# Patient Record
Sex: FEMALE | NOT HISPANIC OR LATINO | Employment: UNEMPLOYED | URBAN - METROPOLITAN AREA
[De-identification: names, ages, dates, MRNs, and addresses within clinical notes are randomized per-mention and may not be internally consistent; named-entity substitution may affect disease eponyms.]

---

## 2017-10-12 ENCOUNTER — HOSPITAL ENCOUNTER (OUTPATIENT)
Dept: FAMILY MEDICINE CLINIC | Facility: CLINIC | Age: 72
Setting detail: SPECIMEN
Discharge: HOME | End: 2017-10-12
Attending: FAMILY MEDICINE | Admitting: FAMILY MEDICINE

## 2017-10-12 LAB
ALBUMIN SERPL-MCNC: 4.1 G/DL (ref 3.5–4.8)
ALBUMIN/GLOB SERPL: 1.8 {RATIO} (ref 1–1.7)
ALP SERPL-CCNC: 37 IU/L (ref 32–91)
ALT SERPL-CCNC: 31 IU/L (ref 14–54)
AMPICILLIN SUSC ISLT: NORMAL
ANION GAP SERPL CALC-SCNC: 10.7 MMOL/L (ref 10–20)
APTT BLD: 34 SEC (ref 61–76.5)
AST SERPL-CCNC: 29 IU/L (ref 15–41)
BACTERIA ISLT: NORMAL
BACTERIA SPEC AEROBE CULT: NORMAL
BASOPHILS # BLD AUTO: 0 10*3/UL (ref 0–0.2)
BASOPHILS NFR BLD AUTO: 1 % (ref 0–2)
BILIRUB SERPL-MCNC: 0.4 MG/DL (ref 0.3–1.2)
BILIRUB UR QL STRIP: NEGATIVE MG/DL
BUN SERPL-MCNC: 18 MG/DL (ref 8–20)
BUN/CREAT SERPL: 25.7 (ref 5.4–26.2)
CALCIUM SERPL-MCNC: 9.3 MG/DL (ref 8.9–10.3)
CASTS URNS QL MICRO: ABNORMAL /[LPF]
CHLORIDE SERPL-SCNC: 102 MMOL/L (ref 101–111)
CHOLEST SERPL-MCNC: 251 MG/DL
CHOLEST/HDLC SERPL: 5.3 {RATIO}
COLONY COUNT: NORMAL
COLOR UR: YELLOW
CONV BACTERIA IN URINE MICRO: ABNORMAL
CONV CLARITY OF URINE: ABNORMAL
CONV CO2: 29 MMOL/L (ref 22–32)
CONV HYALINE CASTS IN URINE MICRO: ABNORMAL /[LPF] (ref 0–5)
CONV LDL CHOLESTEROL DIRECT: 178 MG/DL (ref 0–100)
CONV PROTEIN IN URINE BY AUTOMATED TEST STRIP: NEGATIVE MG/DL
CONV SMALL ROUND CELLS: ABNORMAL /[HPF]
CONV TOTAL PROTEIN: 6.4 G/DL (ref 6.1–7.9)
CONV UROBILINOGEN IN URINE BY AUTOMATED TEST STRIP: 0.2 MG/DL
CREAT UR-MCNC: 0.7 MG/DL (ref 0.4–1)
CULTURE INDICATED?: ABNORMAL
DIFFERENTIAL METHOD BLD: (no result)
EOSINOPHIL # BLD AUTO: 0.2 10*3/UL (ref 0–0.3)
EOSINOPHIL # BLD AUTO: 3 % (ref 0–3)
ERYTHROCYTE [DISTWIDTH] IN BLOOD BY AUTOMATED COUNT: 13.2 % (ref 11.5–14.5)
GLOBULIN UR ELPH-MCNC: 2.3 G/DL (ref 2.5–3.8)
GLUCOSE SERPL-MCNC: 79 MG/DL (ref 65–99)
GLUCOSE UR QL: NEGATIVE MG/DL
HCT VFR BLD AUTO: 36.8 % (ref 35–49)
HDLC SERPL-MCNC: 47 MG/DL
HGB BLD-MCNC: 12.7 G/DL (ref 12–15)
HGB UR QL STRIP: NEGATIVE
INR PPP: 1 (ref 2–3)
KETONES UR QL STRIP: NEGATIVE MG/DL
LDLC/HDLC SERPL: 3.8 {RATIO}
LEUKOCYTE ESTERASE UR QL STRIP: ABNORMAL
LEVOFLOXACIN SUSC ISLT: NORMAL
LIPID INTERPRETATION: ABNORMAL
LYMPHOCYTES # BLD AUTO: 1.1 10*3/UL (ref 0.8–4.8)
LYMPHOCYTES NFR BLD AUTO: 23 % (ref 18–42)
Lab: NORMAL
MCH RBC QN AUTO: 31.5 PG (ref 26–32)
MCHC RBC AUTO-ENTMCNC: 34.4 G/DL (ref 32–36)
MCV RBC AUTO: 91.6 FL (ref 80–94)
MICRO REPORT STATUS: NORMAL
MONOCYTES # BLD AUTO: 0.4 10*3/UL (ref 0.1–1.3)
MONOCYTES NFR BLD AUTO: 9 % (ref 2–11)
NEUTROPHILS # BLD AUTO: 3 10*3/UL (ref 2.3–8.6)
NEUTROPHILS NFR BLD AUTO: 64 % (ref 50–75)
NITRITE UR QL STRIP: NEGATIVE
NITROFURANTOIN SUSC ISLT: NORMAL
NRBC BLD AUTO-RTO: 0 /100{WBCS}
NRBC/RBC NFR BLD MANUAL: 0 10*3/UL
PH UR STRIP.AUTO: 7 [PH] (ref 4.5–8)
PLATELET # BLD AUTO: 233 10*3/UL (ref 150–450)
PMV BLD AUTO: 8.5 FL (ref 7.4–10.4)
POTASSIUM SERPL-SCNC: 3.7 MMOL/L (ref 3.6–5.1)
PROTHROMBIN TIME: 10.7 SEC (ref 19.4–28.5)
RBC # BLD AUTO: 4.02 10*6/UL (ref 4–5.4)
RBC #/AREA URNS HPF: 1 /[HPF] (ref 0–3)
SODIUM SERPL-SCNC: 138 MMOL/L (ref 136–144)
SP GR UR: 1.01 (ref 1–1.03)
SPECIMEN SOURCE: NORMAL
SPERM URNS QL MICRO: ABNORMAL /[HPF]
SQUAMOUS SPT QL MICRO: 10 /[HPF] (ref 0–5)
SUSC METH SPEC: NORMAL
T3 SERPL-MCNC: 0.72 NG/ML (ref 0.87–1.78)
T4 FREE SERPL-MCNC: 1.22 NG/DL (ref 0.58–1.64)
TETRACYCLINE SUSC ISLT: NORMAL
TRIGL SERPL-MCNC: 101 MG/DL
TSH SERPL-ACNC: 1.72 UIU/ML (ref 0.34–5.6)
UNIDENT CRYS URNS QL MICRO: ABNORMAL /[HPF]
VANCOMYCIN SUSC ISLT: NORMAL
VIT B12 SERPL-MCNC: 399 PG/ML (ref 180–914)
VLDLC SERPL CALC-MCNC: 25.5 MG/DL
WBC # BLD AUTO: 4.7 10*3/UL (ref 4.5–11.5)
WBC #/AREA URNS HPF: 31 /[HPF] (ref 0–5)
YEAST SPEC QL WET PREP: ABNORMAL /[HPF]

## 2017-11-02 ENCOUNTER — HOSPITAL ENCOUNTER (OUTPATIENT)
Dept: ORTHOPEDIC SURGERY | Facility: CLINIC | Age: 72
Discharge: HOME | End: 2017-11-02
Attending: PODIATRIST | Admitting: PODIATRIST

## 2018-06-11 ENCOUNTER — HOSPITAL ENCOUNTER (OUTPATIENT)
Dept: FAMILY MEDICINE CLINIC | Facility: CLINIC | Age: 73
Setting detail: SPECIMEN
Discharge: HOME | End: 2018-06-11
Attending: FAMILY MEDICINE | Admitting: FAMILY MEDICINE

## 2018-06-11 LAB
ALBUMIN SERPL-MCNC: 3.9 G/DL (ref 3.5–4.8)
ALBUMIN/GLOB SERPL: 1.6 {RATIO} (ref 1–1.7)
ALP SERPL-CCNC: 49 IU/L (ref 32–91)
ALT SERPL-CCNC: 28 IU/L (ref 14–54)
ANION GAP SERPL CALC-SCNC: 13.2 MMOL/L (ref 10–20)
AST SERPL-CCNC: 29 IU/L (ref 15–41)
BILIRUB SERPL-MCNC: 0.8 MG/DL (ref 0.3–1.2)
BUN SERPL-MCNC: 13 MG/DL (ref 8–20)
BUN/CREAT SERPL: 18.6 (ref 5.4–26.2)
CALCIUM SERPL-MCNC: 9 MG/DL (ref 8.9–10.3)
CHLORIDE SERPL-SCNC: 102 MMOL/L (ref 101–111)
CHOLEST SERPL-MCNC: 192 MG/DL
CHOLEST/HDLC SERPL: 5.1 {RATIO}
CONV CO2: 27 MMOL/L (ref 22–32)
CONV LDL CHOLESTEROL DIRECT: 131 MG/DL (ref 0–100)
CONV TOTAL PROTEIN: 6.4 G/DL (ref 6.1–7.9)
CREAT UR-MCNC: 0.7 MG/DL (ref 0.4–1)
GLOBULIN UR ELPH-MCNC: 2.5 G/DL (ref 2.5–3.8)
GLUCOSE SERPL-MCNC: 95 MG/DL (ref 65–99)
HDLC SERPL-MCNC: 37 MG/DL
LDLC/HDLC SERPL: 3.5 {RATIO}
LIPID INTERPRETATION: ABNORMAL
POTASSIUM SERPL-SCNC: 4.2 MMOL/L (ref 3.6–5.1)
SODIUM SERPL-SCNC: 138 MMOL/L (ref 136–144)
TRIGL SERPL-MCNC: 103 MG/DL
VLDLC SERPL CALC-MCNC: 23.4 MG/DL

## 2018-10-16 ENCOUNTER — HOSPITAL ENCOUNTER (OUTPATIENT)
Dept: FAMILY MEDICINE CLINIC | Facility: CLINIC | Age: 73
Setting detail: SPECIMEN
Discharge: HOME | End: 2018-10-16
Attending: FAMILY MEDICINE | Admitting: FAMILY MEDICINE

## 2018-10-16 LAB
ALBUMIN SERPL-MCNC: 4 G/DL (ref 3.5–4.8)
ALBUMIN/GLOB SERPL: 1.5 {RATIO} (ref 1–1.7)
ALP SERPL-CCNC: 55 IU/L (ref 32–91)
ALT SERPL-CCNC: 29 IU/L (ref 14–54)
ANION GAP SERPL CALC-SCNC: 15.8 MMOL/L (ref 10–20)
AST SERPL-CCNC: 28 IU/L (ref 15–41)
BASOPHILS # BLD AUTO: 0.1 10*3/UL (ref 0–0.2)
BASOPHILS NFR BLD AUTO: 1 % (ref 0–2)
BILIRUB SERPL-MCNC: 0.7 MG/DL (ref 0.3–1.2)
BUN SERPL-MCNC: 13 MG/DL (ref 8–20)
BUN/CREAT SERPL: 16.3 (ref 5.4–26.2)
CALCIUM SERPL-MCNC: 9.1 MG/DL (ref 8.9–10.3)
CHLORIDE SERPL-SCNC: 102 MMOL/L (ref 101–111)
CHOLEST SERPL-MCNC: 180 MG/DL
CHOLEST/HDLC SERPL: 4.1 {RATIO}
CONV CO2: 27 MMOL/L (ref 22–32)
CONV LDL CHOLESTEROL DIRECT: 121 MG/DL (ref 0–100)
CONV TOTAL PROTEIN: 6.6 G/DL (ref 6.1–7.9)
CREAT UR-MCNC: 0.8 MG/DL (ref 0.4–1)
DIFFERENTIAL METHOD BLD: (no result)
EOSINOPHIL # BLD AUTO: 0.1 10*3/UL (ref 0–0.3)
EOSINOPHIL # BLD AUTO: 2 % (ref 0–3)
ERYTHROCYTE [DISTWIDTH] IN BLOOD BY AUTOMATED COUNT: 12.6 % (ref 11.5–14.5)
ERYTHROCYTE [SEDIMENTATION RATE] IN BLOOD BY WESTERGREN METHOD: 16 MM/HR (ref 0–30)
GLOBULIN UR ELPH-MCNC: 2.6 G/DL (ref 2.5–3.8)
GLUCOSE SERPL-MCNC: 85 MG/DL (ref 65–99)
HCT VFR BLD AUTO: 38.1 % (ref 35–49)
HDLC SERPL-MCNC: 44 MG/DL
HGB BLD-MCNC: 12.7 G/DL (ref 12–15)
LDLC/HDLC SERPL: 2.8 {RATIO}
LIPID INTERPRETATION: ABNORMAL
LYMPHOCYTES # BLD AUTO: 1.5 10*3/UL (ref 0.8–4.8)
LYMPHOCYTES NFR BLD AUTO: 24 % (ref 18–42)
MCH RBC QN AUTO: 30.4 PG (ref 26–32)
MCHC RBC AUTO-ENTMCNC: 33.4 G/DL (ref 32–36)
MCV RBC AUTO: 90.9 FL (ref 80–94)
MONOCYTES # BLD AUTO: 0.4 10*3/UL (ref 0.1–1.3)
MONOCYTES NFR BLD AUTO: 7 % (ref 2–11)
NEUTROPHILS # BLD AUTO: 4.3 10*3/UL (ref 2.3–8.6)
NEUTROPHILS NFR BLD AUTO: 66 % (ref 50–75)
NRBC BLD AUTO-RTO: 0 /100{WBCS}
NRBC/RBC NFR BLD MANUAL: 0 10*3/UL
PLATELET # BLD AUTO: 266 10*3/UL (ref 150–450)
PMV BLD AUTO: 9.8 FL (ref 7.4–10.4)
POTASSIUM SERPL-SCNC: 3.8 MMOL/L (ref 3.6–5.1)
RBC # BLD AUTO: 4.19 10*6/UL (ref 4–5.4)
SODIUM SERPL-SCNC: 141 MMOL/L (ref 136–144)
T3 SERPL-MCNC: 0.54 NG/ML (ref 0.87–1.78)
T4 FREE SERPL-MCNC: 1.25 NG/DL (ref 0.58–1.64)
TRIGL SERPL-MCNC: 108 MG/DL
TSH SERPL-ACNC: 2.53 UIU/ML (ref 0.34–5.6)
VIT B12 SERPL-MCNC: 545 PG/ML (ref 180–914)
VLDLC SERPL CALC-MCNC: 15.3 MG/DL
WBC # BLD AUTO: 6.4 10*3/UL (ref 4.5–11.5)

## 2019-01-03 ENCOUNTER — HOSPITAL ENCOUNTER (OUTPATIENT)
Dept: FAMILY MEDICINE CLINIC | Facility: CLINIC | Age: 74
Setting detail: SPECIMEN
Discharge: HOME | End: 2019-01-03
Attending: FAMILY MEDICINE | Admitting: FAMILY MEDICINE

## 2019-01-03 LAB
CHOLEST SERPL-MCNC: 166 MG/DL
CHOLEST/HDLC SERPL: 3.7 {RATIO}
CONV LDL CHOLESTEROL DIRECT: 117 MG/DL (ref 0–100)
HDLC SERPL-MCNC: 45 MG/DL
LDLC/HDLC SERPL: 2.6 {RATIO}
LIPID INTERPRETATION: ABNORMAL
TRIGL SERPL-MCNC: 82 MG/DL
VLDLC SERPL CALC-MCNC: 3.8 MG/DL

## 2019-10-02 RX ORDER — LEVOTHYROXINE SODIUM 0.07 MG/1
TABLET ORAL
Qty: 90 TABLET | Refills: 3 | Status: SHIPPED | OUTPATIENT
Start: 2019-10-02 | End: 2019-10-25 | Stop reason: SDUPTHER

## 2019-10-02 RX ORDER — OMEPRAZOLE 40 MG/1
CAPSULE, DELAYED RELEASE ORAL
Qty: 90 CAPSULE | Refills: 3 | Status: SHIPPED | OUTPATIENT
Start: 2019-10-02 | End: 2019-10-25 | Stop reason: SDUPTHER

## 2019-10-17 DIAGNOSIS — E03.9 HYPOTHYROIDISM, UNSPECIFIED TYPE: Primary | ICD-10-CM

## 2019-10-17 DIAGNOSIS — E78.5 HYPERLIPIDEMIA, UNSPECIFIED HYPERLIPIDEMIA TYPE: ICD-10-CM

## 2019-10-17 DIAGNOSIS — I10 ESSENTIAL HYPERTENSION: ICD-10-CM

## 2019-10-17 DIAGNOSIS — N39.0 URINARY TRACT INFECTION WITHOUT HEMATURIA, SITE UNSPECIFIED: ICD-10-CM

## 2019-10-17 LAB
ALBUMIN SERPL-MCNC: 4 G/DL (ref 3.5–5.2)
ALBUMIN/GLOB SERPL: 1.5 G/DL
ALP SERPL-CCNC: 72 U/L (ref 39–117)
ALT SERPL W P-5'-P-CCNC: 21 U/L (ref 1–33)
ANION GAP SERPL CALCULATED.3IONS-SCNC: 10.1 MMOL/L (ref 5–15)
AST SERPL-CCNC: 23 U/L (ref 1–32)
BACTERIA UR QL AUTO: ABNORMAL /HPF
BASOPHILS # BLD AUTO: 0.05 10*3/MM3 (ref 0–0.2)
BASOPHILS NFR BLD AUTO: 1 % (ref 0–1.5)
BILIRUB SERPL-MCNC: 0.6 MG/DL (ref 0.2–1.2)
BILIRUB UR QL STRIP: NEGATIVE
BUN BLD-MCNC: 13 MG/DL (ref 8–23)
BUN/CREAT SERPL: 21.7 (ref 7–25)
CALCIUM SPEC-SCNC: 9.5 MG/DL (ref 8.6–10.5)
CHLORIDE SERPL-SCNC: 102 MMOL/L (ref 98–107)
CHOLEST SERPL-MCNC: 178 MG/DL (ref 0–200)
CLARITY UR: ABNORMAL
CO2 SERPL-SCNC: 28.9 MMOL/L (ref 22–29)
COLOR UR: YELLOW
CREAT BLD-MCNC: 0.6 MG/DL (ref 0.57–1)
DEPRECATED RDW RBC AUTO: 39 FL (ref 37–54)
EOSINOPHIL # BLD AUTO: 0.14 10*3/MM3 (ref 0–0.4)
EOSINOPHIL NFR BLD AUTO: 2.8 % (ref 0.3–6.2)
ERYTHROCYTE [DISTWIDTH] IN BLOOD BY AUTOMATED COUNT: 12 % (ref 12.3–15.4)
GFR SERPL CREATININE-BSD FRML MDRD: 118 ML/MIN/1.73
GFR SERPL CREATININE-BSD FRML MDRD: 97 ML/MIN/1.73
GLOBULIN UR ELPH-MCNC: 2.7 GM/DL
GLUCOSE BLD-MCNC: 89 MG/DL (ref 65–99)
GLUCOSE UR STRIP-MCNC: NEGATIVE MG/DL
HCT VFR BLD AUTO: 37.3 % (ref 34–46.6)
HDLC SERPL-MCNC: 48 MG/DL (ref 40–60)
HGB BLD-MCNC: 12.4 G/DL (ref 12–15.9)
HGB UR QL STRIP.AUTO: NEGATIVE
HYALINE CASTS UR QL AUTO: ABNORMAL /LPF
IMM GRANULOCYTES # BLD AUTO: 0.02 10*3/MM3 (ref 0–0.05)
IMM GRANULOCYTES NFR BLD AUTO: 0.4 % (ref 0–0.5)
KETONES UR QL STRIP: NEGATIVE
LDLC SERPL CALC-MCNC: 115 MG/DL (ref 0–100)
LDLC/HDLC SERPL: 2.4 {RATIO}
LEUKOCYTE ESTERASE UR QL STRIP.AUTO: ABNORMAL
LYMPHOCYTES # BLD AUTO: 1.06 10*3/MM3 (ref 0.7–3.1)
LYMPHOCYTES NFR BLD AUTO: 21.4 % (ref 19.6–45.3)
MCH RBC QN AUTO: 29.8 PG (ref 26.6–33)
MCHC RBC AUTO-ENTMCNC: 33.2 G/DL (ref 31.5–35.7)
MCV RBC AUTO: 89.7 FL (ref 79–97)
MONOCYTES # BLD AUTO: 0.43 10*3/MM3 (ref 0.1–0.9)
MONOCYTES NFR BLD AUTO: 8.7 % (ref 5–12)
NEUTROPHILS # BLD AUTO: 3.26 10*3/MM3 (ref 1.7–7)
NEUTROPHILS NFR BLD AUTO: 65.7 % (ref 42.7–76)
NITRITE UR QL STRIP: POSITIVE
NRBC BLD AUTO-RTO: 0 /100 WBC (ref 0–0.2)
PH UR STRIP.AUTO: 7 [PH] (ref 5–8)
PLATELET # BLD AUTO: 281 10*3/MM3 (ref 140–450)
PMV BLD AUTO: 11.1 FL (ref 6–12)
POTASSIUM BLD-SCNC: 4.2 MMOL/L (ref 3.5–5.2)
PROT SERPL-MCNC: 6.7 G/DL (ref 6–8.5)
PROT UR QL STRIP: NEGATIVE
RBC # BLD AUTO: 4.16 10*6/MM3 (ref 3.77–5.28)
RBC # UR: ABNORMAL /HPF
REF LAB TEST METHOD: ABNORMAL
SODIUM BLD-SCNC: 141 MMOL/L (ref 136–145)
SP GR UR STRIP: 1.01 (ref 1–1.03)
SQUAMOUS #/AREA URNS HPF: ABNORMAL /HPF
TRIGL SERPL-MCNC: 74 MG/DL (ref 0–150)
TSH SERPL DL<=0.05 MIU/L-ACNC: 0.06 UIU/ML (ref 0.27–4.2)
UROBILINOGEN UR QL STRIP: ABNORMAL
VLDLC SERPL-MCNC: 14.8 MG/DL (ref 5–40)
WBC NRBC COR # BLD: 4.96 10*3/MM3 (ref 3.4–10.8)
WBC UR QL AUTO: ABNORMAL /HPF

## 2019-10-17 PROCEDURE — 84439 ASSAY OF FREE THYROXINE: CPT | Performed by: FAMILY MEDICINE

## 2019-10-17 PROCEDURE — 84480 ASSAY TRIIODOTHYRONINE (T3): CPT

## 2019-10-17 PROCEDURE — 84443 ASSAY THYROID STIM HORMONE: CPT | Performed by: FAMILY MEDICINE

## 2019-10-17 PROCEDURE — 80061 LIPID PANEL: CPT | Performed by: FAMILY MEDICINE

## 2019-10-17 PROCEDURE — 87086 URINE CULTURE/COLONY COUNT: CPT

## 2019-10-17 PROCEDURE — 36415 COLL VENOUS BLD VENIPUNCTURE: CPT

## 2019-10-17 PROCEDURE — 81001 URINALYSIS AUTO W/SCOPE: CPT

## 2019-10-17 PROCEDURE — 87147 CULTURE TYPE IMMUNOLOGIC: CPT

## 2019-10-17 PROCEDURE — 80053 COMPREHEN METABOLIC PANEL: CPT | Performed by: FAMILY MEDICINE

## 2019-10-17 PROCEDURE — 87186 SC STD MICRODIL/AGAR DIL: CPT

## 2019-10-17 PROCEDURE — 85025 COMPLETE CBC W/AUTO DIFF WBC: CPT | Performed by: FAMILY MEDICINE

## 2019-10-18 DIAGNOSIS — E03.9 HYPOTHYROIDISM, UNSPECIFIED TYPE: Primary | ICD-10-CM

## 2019-10-18 LAB
T3 SERPL-MCNC: 101 NG/DL (ref 80–200)
T4 FREE SERPL-MCNC: 1.55 NG/DL (ref 0.93–1.7)

## 2019-10-19 LAB
BACTERIA SPEC AEROBE CULT: ABNORMAL
BACTERIA SPEC AEROBE CULT: ABNORMAL
STREP GROUPING: ABNORMAL

## 2019-10-22 ENCOUNTER — TELEPHONE (OUTPATIENT)
Dept: FAMILY MEDICINE CLINIC | Facility: CLINIC | Age: 74
End: 2019-10-22

## 2019-10-22 RX ORDER — AMOXICILLIN 500 MG/1
500 CAPSULE ORAL 3 TIMES DAILY
Qty: 21 CAPSULE | Refills: 0 | Status: SHIPPED | OUTPATIENT
Start: 2019-10-22 | End: 2020-03-12 | Stop reason: SDUPTHER

## 2019-10-22 NOTE — TELEPHONE ENCOUNTER
Patient left a voice message stating that she got a message from MISAEL yesterday that he was calling in an antibiotic for her uti.  In the urine culture result from 10/17 MISAEL said to call in Amoxil 500mg three times daily #21.  Guess it never made it to you.  Please send in.

## 2019-10-24 ENCOUNTER — TELEPHONE (OUTPATIENT)
Dept: FAMILY MEDICINE CLINIC | Facility: CLINIC | Age: 74
End: 2019-10-24

## 2019-10-24 NOTE — TELEPHONE ENCOUNTER
----- Message from Markell Soliman MD sent at 10/21/2019  8:15 AM EDT -----  Call and amoxicillin 500 mg 1 p.o. 3 times daily #21

## 2019-10-25 ENCOUNTER — OFFICE VISIT (OUTPATIENT)
Dept: FAMILY MEDICINE CLINIC | Facility: CLINIC | Age: 74
End: 2019-10-25

## 2019-10-25 VITALS
HEART RATE: 76 BPM | SYSTOLIC BLOOD PRESSURE: 144 MMHG | RESPIRATION RATE: 12 BRPM | WEIGHT: 134.8 LBS | DIASTOLIC BLOOD PRESSURE: 73 MMHG | BODY MASS INDEX: 21.11 KG/M2

## 2019-10-25 DIAGNOSIS — Z12.31 BREAST CANCER SCREENING BY MAMMOGRAM: ICD-10-CM

## 2019-10-25 DIAGNOSIS — M85.852 OSTEOPENIA OF NECKS OF BOTH FEMURS: ICD-10-CM

## 2019-10-25 DIAGNOSIS — Z00.00 MEDICARE ANNUAL WELLNESS VISIT, SUBSEQUENT: Primary | ICD-10-CM

## 2019-10-25 DIAGNOSIS — M85.851 OSTEOPENIA OF NECKS OF BOTH FEMURS: ICD-10-CM

## 2019-10-25 PROBLEM — G43.909 MIGRAINE HEADACHE: Status: ACTIVE | Noted: 2018-10-16

## 2019-10-25 PROBLEM — M19.079 ARTHRITIS OF FOOT: Status: ACTIVE | Noted: 2017-11-02

## 2019-10-25 PROBLEM — R41.3 MEMORY LOSS: Status: ACTIVE | Noted: 2018-10-16

## 2019-10-25 PROBLEM — E78.5 HYPERLIPIDEMIA: Status: ACTIVE | Noted: 2019-10-25

## 2019-10-25 PROCEDURE — G0439 PPPS, SUBSEQ VISIT: HCPCS | Performed by: FAMILY MEDICINE

## 2019-10-25 RX ORDER — LIOTHYRONINE SODIUM 5 UG/1
TABLET ORAL
Qty: 180 TABLET | Refills: 3 | Status: SHIPPED | OUTPATIENT
Start: 2019-10-25 | End: 2020-10-27 | Stop reason: SDUPTHER

## 2019-10-25 RX ORDER — ASPIRIN 325 MG
TABLET ORAL
COMMUNITY
Start: 2013-12-24 | End: 2020-03-12 | Stop reason: SDUPTHER

## 2019-10-25 RX ORDER — MULTIVITAMIN
CAPSULE ORAL
COMMUNITY
Start: 2015-03-09

## 2019-10-25 RX ORDER — LEVOTHYROXINE SODIUM 0.07 MG/1
75 TABLET ORAL DAILY
Qty: 90 TABLET | Refills: 3 | Status: SHIPPED | OUTPATIENT
Start: 2019-10-25 | End: 2020-10-27 | Stop reason: SDUPTHER

## 2019-10-25 RX ORDER — OMEPRAZOLE 40 MG/1
40 CAPSULE, DELAYED RELEASE ORAL DAILY
Qty: 90 CAPSULE | Refills: 3 | Status: SHIPPED | OUTPATIENT
Start: 2019-10-25 | End: 2020-10-27 | Stop reason: SDUPTHER

## 2019-10-25 RX ORDER — CETIRIZINE HYDROCHLORIDE 10 MG/1
TABLET ORAL
COMMUNITY
Start: 2015-12-04

## 2019-10-25 RX ORDER — DICLOFENAC SODIUM 75 MG/1
TABLET, DELAYED RELEASE ORAL EVERY 12 HOURS
COMMUNITY
Start: 2017-08-29 | End: 2019-10-25 | Stop reason: SDUPTHER

## 2019-10-25 RX ORDER — DICLOFENAC SODIUM 75 MG/1
75 TABLET, DELAYED RELEASE ORAL 2 TIMES DAILY
Qty: 180 TABLET | Refills: 3 | Status: SHIPPED | OUTPATIENT
Start: 2019-10-25 | End: 2020-10-27 | Stop reason: SDUPTHER

## 2019-10-25 RX ORDER — LIOTHYRONINE SODIUM 5 UG/1
TABLET ORAL EVERY 12 HOURS
COMMUNITY
Start: 2018-10-18 | End: 2019-10-25 | Stop reason: SDUPTHER

## 2019-10-25 NOTE — PROGRESS NOTES
The ABCs of the Annual Wellness Visit  Subsequent Medicare Wellness Visit    Chief Complaint   Patient presents with   • Medicare Wellness-subsequent       Subjective   History of Present Illness:  Helen Rousseau is a 74 y.o. female who presents for a Subsequent Medicare Wellness Visit.  74-year-old who states she does not do much exercise although she does walk some.  She denies any issues with chest pain or dizziness or syncope with exertion.  She denies any blood in her stool or urine.  She has no issues with her memory, has no issues with taking care of her own ADLs.    HEALTH RISK ASSESSMENT    Recent Hospitalizations:  No hospitalization(s) within the last year.    Current Medical Providers:  Patient Care Team:  Markell Soliman MD as PCP - General (Family Medicine)    Smoking Status:  Social History     Tobacco Use   Smoking Status Never Smoker   Smokeless Tobacco Never Used       Alcohol Consumption:  Social History     Substance and Sexual Activity   Alcohol Use Yes   • Frequency: Monthly or less   • Drinks per session: 1 or 2       Depression Screen:   PHQ-2/PHQ-9 Depression Screening 10/25/2019   Little interest or pleasure in doing things 0   Feeling down, depressed, or hopeless 0   Total Score 0       Fall Risk Screen:  STEADI Fall Risk Assessment was completed, and patient is at LOW risk for falls.Assessment completed on:10/25/2019    Health Habits and Functional and Cognitive Screening:  Functional & Cognitive Status 10/25/2019   Do you have difficulty preparing food and eating? No   Do you have difficulty bathing yourself, getting dressed or grooming yourself? No   Do you have difficulty using the toilet? No   Do you have difficulty moving around from place to place? No   Do you have trouble with steps or getting out of a bed or a chair? No   Current Diet Well Balanced Diet   Dental Exam Up to date   Eye Exam Not up to date   Exercise (times per week) 0 times per week   Current Exercise Activities  Include Housecleaning   Do you need help using the phone?  No   Are you deaf or do you have serious difficulty hearing?  No   Do you need help with transportation? No   Do you need help shopping? No   Do you need help preparing meals?  No   Do you need help with housework?  No   Do you need help with laundry? No   Do you need help taking your medications? No   Do you need help managing money? No   Do you ever drive or ride in a car without wearing a seat belt? No   Have you felt unusual stress, anger or loneliness in the last month? No   Who do you live with? Spouse   If you need help, do you have trouble finding someone available to you? No   Do you have difficulty concentrating, remembering or making decisions? No         Does the patient have evidence of cognitive impairment? No    Asprin use counseling:Taking ASA appropriately as indicated    Age-appropriate Screening Schedule:  Refer to the list below for future screening recommendations based on patient's age, sex and/or medical conditions. Orders for these recommended tests are listed in the plan section. The patient has been provided with a written plan.    Health Maintenance   Topic Date Due   • INFLUENZA VACCINE  08/01/2019   • TDAP/TD VACCINES (1 - Tdap) 10/13/2020 (Originally 5/27/1964)   • ZOSTER VACCINE (2 of 3) 10/25/2020 (Originally 3/4/2014)   • LIPID PANEL  10/17/2020   • COLONOSCOPY  03/15/2026   • PNEUMOCOCCAL VACCINES (65+ LOW/MEDIUM RISK)  Completed          The following portions of the patient's history were reviewed and updated as appropriate: allergies, current medications, past family history, past medical history, past social history, past surgical history and problem list.    Outpatient Medications Prior to Visit   Medication Sig Dispense Refill   • aspirin 325 MG tablet ASPIRIN 325 MG TABS     • cetirizine (ZYRTEC ALLERGY) 10 MG tablet ZYRTEC ALLERGY 10 MG TABS     • Multiple Vitamin (MULTIVITAMIN) capsule MULTIVITAMINS CAPS     •  diclofenac (VOLTAREN) 75 MG EC tablet Every 12 (Twelve) Hours.     • liothyronine (CYTOMEL) 5 MCG tablet Every 12 (Twelve) Hours.     • pitavastatin calcium (LIVALO) 2 MG tablet tablet Daily.     • amoxicillin (AMOXIL) 500 MG capsule Take 1 capsule by mouth 3 (Three) Times a Day. 21 capsule 0   • levothyroxine (SYNTHROID, LEVOTHROID) 75 MCG tablet TAKE ONE TABLET BY MOUTH EVERY DAY 90 tablet 3   • omeprazole (priLOSEC) 40 MG capsule TAKE 1 CAPSULE BY MOUTH EVERY DAY 90 capsule 3     No facility-administered medications prior to visit.        Patient Active Problem List   Diagnosis   • Arthritis of foot   • Atrial fibrillation (CMS/HCC)   • Bell's palsy   • Constipation, chronic   • Edema leg   • Leg cramps   • Fatigue   • Gastroesophageal reflux disease   • Hashimoto's thyroiditis   • Hyperlipidemia   • Hypertension   • Hypotension   • Hypothyroidism   • Leukopenia   • Memory loss   • Migraine headache   • Arthritis   • Osteoarthritis of knee   • Pyelonephritis, acute   • Other specified dorsopathies, site unspecified   • Medicare annual wellness visit, subsequent       Advanced Care Planning:  Patient does not have an advance directive - information provided to the patient today    Review of Systems   Constitutional: Negative for chills and fever.   HENT: Negative for nosebleeds.    Respiratory: Negative for chest tightness and shortness of breath.    Cardiovascular: Negative for chest pain and palpitations.   Gastrointestinal: Negative for blood in stool.   Genitourinary: Negative for dysuria and hematuria.   Neurological: Negative for dizziness and syncope.   Psychiatric/Behavioral: Negative for confusion.       Compared to one year ago, the patient feels her physical health is the same.  Compared to one year ago, the patient feels her mental health is the same.    Reviewed chart for potential of high risk medication in the elderly: yes  Reviewed chart for potential of harmful drug interactions in the  elderly:yes    Objective         Vitals:    10/25/19 1340   BP: 144/73   BP Location: Right arm   Patient Position: Sitting   Cuff Size: Adult   Pulse: 76   Resp: 12   Weight: 61.1 kg (134 lb 12.8 oz)   PainSc: 0-No pain       Body mass index is 21.11 kg/m².  Discussed the patient's BMI with her. The BMI is in the acceptable range.    Physical Exam   Constitutional: She is oriented to person, place, and time. She appears well-developed and well-nourished. No distress.   HENT:   Head: Normocephalic and atraumatic.   Nose: Nose normal.   Mouth/Throat: Oropharynx is clear and moist.   Eyes: Conjunctivae, EOM and lids are normal. Pupils are equal, round, and reactive to light.   Neck: Normal range of motion. Neck supple. No JVD present. Carotid bruit is not present. No thyroid mass and no thyromegaly present.   Cardiovascular: Normal rate, regular rhythm, normal heart sounds and intact distal pulses.   Pulmonary/Chest: Effort normal and breath sounds normal.   Abdominal: Soft. Bowel sounds are normal. She exhibits no distension and no mass. There is no tenderness.   Musculoskeletal: Normal range of motion.   Neurological: She is alert and oriented to person, place, and time.   Skin: Skin is warm and dry.   Psychiatric: She has a normal mood and affect. Her speech is normal and behavior is normal. Judgment and thought content normal. She is attentive.   Nursing note and vitals reviewed.      Lab Results   Component Value Date    TRIG 74 10/17/2019    HDL 48 10/17/2019     (H) 10/17/2019    VLDL 14.8 10/17/2019        Assessment/Plan   Medicare Risks and Personalized Health Plan  CMS Preventative Services Quick Reference  Breast Cancer/Mammogram Screening  Colon Cancer Screening  Dementia/Memory   Fall Risk  Glaucoma Risk  Hearing Problem  Immunizations Discussed/Encouraged (specific immunizations; adacel Tdap, Influenza, Pneumococcal 23, Prevnar and Shingrix )  Osteoprorosis Risk    The above risks/problems have  been discussed with the patient.  Pertinent information has been shared with the patient in the After Visit Summary.  Follow up plans and orders are seen below in the Assessment/Plan Section.    Diagnoses and all orders for this visit:    1. Medicare annual wellness visit, subsequent (Primary)  Assessment & Plan:  Aerobic exercise for cardiovascular benefits, weight bearing exercise for bones  Nutrition discussed  Immunizations        2. Osteopenia of necks of both femurs  -     DEXA Bone Density Axial    3. Breast cancer screening by mammogram  -     Mammo Screening Digital Tomosynthesis Bilateral With CAD; Future    Other orders  -     Calcium Carbonate (CALTRATE 600) 1500 (600 Ca) MG tablet; Take 1 tablet by mouth Daily.  Dispense: 60 tablet; Refill: 11  -     diclofenac (VOLTAREN) 75 MG EC tablet; Take 1 tablet by mouth 2 (Two) Times a Day.  Dispense: 180 tablet; Refill: 3  -     liothyronine (CYTOMEL) 5 MCG tablet; 2 po q d  Dispense: 180 tablet; Refill: 3  -     pitavastatin calcium (LIVALO) 2 MG tablet tablet; Take 2 tablets by mouth Daily.  Dispense: 90 tablet; Refill: 3  -     levothyroxine (SYNTHROID, LEVOTHROID) 75 MCG tablet; Take 1 tablet by mouth Daily.  Dispense: 90 tablet; Refill: 3  -     omeprazole (priLOSEC) 40 MG capsule; Take 1 capsule by mouth Daily.  Dispense: 90 capsule; Refill: 3    Follow Up:  Return in about 1 year (around 10/25/2020) for Annual physical.     An After Visit Summary and PPPS were given to the patient.

## 2019-10-25 NOTE — PATIENT INSTRUCTIONS
Please check with your insurance and get the new shingrix vaccine series to prevent shingles.  Please check with insurance and get your adacel Tdap immunization    Exercising to Stay Healthy  To become healthy and stay healthy, it is recommended that you do moderate-intensity and vigorous-intensity exercise. You can tell that you are exercising at a moderate intensity if your heart starts beating faster and you start breathing faster but can still hold a conversation. You can tell that you are exercising at a vigorous intensity if you are breathing much harder and faster and cannot hold a conversation while exercising.  Exercising regularly is important. It has many health benefits, such as:  · Improving overall fitness, flexibility, and endurance.  · Increasing bone density.  · Helping with weight control.  · Decreasing body fat.  · Increasing muscle strength.  · Reducing stress and tension.  · Improving overall health.  How often should I exercise?  Choose an activity that you enjoy, and set realistic goals. Your health care provider can help you make an activity plan that works for you.  Exercise regularly as told by your health care provider. This may include:  · Doing strength training two times a week, such as:  ? Lifting weights.  ? Using resistance bands.  ? Push-ups.  ? Sit-ups.  ? Yoga.  · Doing a certain intensity of exercise for a given amount of time. Choose from these options:  ? A total of 150 minutes of moderate-intensity exercise every week.  ? A total of 75 minutes of vigorous-intensity exercise every week.  ? A mix of moderate-intensity and vigorous-intensity exercise every week.  Children, pregnant women, people who have not exercised regularly, people who are overweight, and older adults may need to talk with a health care provider about what activities are safe to do. If you have a medical condition, be sure to talk with your health care provider before you start a new exercise program.  What  are some exercise ideas?  Moderate-intensity exercise ideas include:  · Walking 1 mile (1.6 km) in about 15 minutes.  · Biking.  · Hiking.  · Golfing.  · Dancing.  · Water aerobics.  Vigorous-intensity exercise ideas include:  · Walking 4.5 miles (7.2 km) or more in about 1 hour.  · Jogging or running 5 miles (8 km) in about 1 hour.  · Biking 10 miles (16.1 km) or more in about 1 hour.  · Lap swimming.  · Roller-skating or in-line skating.  · Cross-country skiing.  · Vigorous competitive sports, such as football, basketball, and soccer.  · Jumping rope.  · Aerobic dancing.  What are some everyday activities that can help me to get exercise?  · Yard work, such as:  ? Pushing a .  ? Raking and bagging leaves.  · Washing your car.  · Pushing a stroller.  · Shoveling snow.  · Gardening.  · Washing windows or floors.  How can I be more active in my day-to-day activities?  · Use stairs instead of an elevator.  · Take a walk during your lunch break.  · If you drive, park your car farther away from your work or school.  · If you take public transportation, get off one stop early and walk the rest of the way.  · Stand up or walk around during all of your indoor phone calls.  · Get up, stretch, and walk around every 30 minutes throughout the day.  · Enjoy exercise with a friend. Support to continue exercising will help you keep a regular routine of activity.  What guidelines can I follow while exercising?  · Before you start a new exercise program, talk with your health care provider.  · Do not exercise so much that you hurt yourself, feel dizzy, or get very short of breath.  · Wear comfortable clothes and wear shoes with good support.  · Drink plenty of water while you exercise to prevent dehydration or heat stroke.  · Work out until your breathing and your heartbeat get faster.  Where to find more information  · [place] Department of Health and Human Services: www.hhs.gov  · Centers for Disease Control and Prevention  (CDC): www.cdc.gov  Summary  · Exercising regularly is important. It will improve your overall fitness, flexibility, and endurance.  · Regular exercise also will improve your overall health. It can help you control your weight, reduce stress, and improve your bone density.  · Do not exercise so much that you hurt yourself, feel dizzy, or get very short of breath.  · Before you start a new exercise program, talk with your health care provider.  This information is not intended to replace advice given to you by your health care provider. Make sure you discuss any questions you have with your health care provider.  Document Released: 01/20/2012 Document Revised: 11/08/2018 Document Reviewed: 11/08/2018  [employer] Interactive Patient Education © 2019 [employer]

## 2019-10-25 NOTE — ASSESSMENT & PLAN NOTE
Aerobic exercise for cardiovascular benefits, weight bearing exercise for bones  Nutrition discussed  Immunizations

## 2020-01-25 RX ORDER — PROMETHAZINE HYDROCHLORIDE 25 MG/1
25 SUPPOSITORY RECTAL EVERY 6 HOURS PRN
Qty: 12 SUPPOSITORY | Refills: 0 | Status: SHIPPED | OUTPATIENT
Start: 2020-01-25

## 2020-03-06 DIAGNOSIS — Z12.31 BREAST CANCER SCREENING BY MAMMOGRAM: ICD-10-CM

## 2020-03-12 ENCOUNTER — OFFICE VISIT (OUTPATIENT)
Dept: FAMILY MEDICINE CLINIC | Facility: CLINIC | Age: 75
End: 2020-03-12

## 2020-03-12 VITALS
SYSTOLIC BLOOD PRESSURE: 129 MMHG | RESPIRATION RATE: 12 BRPM | DIASTOLIC BLOOD PRESSURE: 71 MMHG | TEMPERATURE: 97.8 F | BODY MASS INDEX: 21.83 KG/M2 | WEIGHT: 139.4 LBS | HEART RATE: 88 BPM

## 2020-03-12 DIAGNOSIS — J01.00 ACUTE NON-RECURRENT MAXILLARY SINUSITIS: ICD-10-CM

## 2020-03-12 DIAGNOSIS — J02.9 PHARYNGITIS, UNSPECIFIED ETIOLOGY: Primary | ICD-10-CM

## 2020-03-12 LAB
EXPIRATION DATE: NORMAL
INTERNAL CONTROL: NORMAL
Lab: NORMAL
S PYO AG THROAT QL: NEGATIVE

## 2020-03-12 PROCEDURE — 99213 OFFICE O/P EST LOW 20 MIN: CPT | Performed by: FAMILY MEDICINE

## 2020-03-12 PROCEDURE — 87880 STREP A ASSAY W/OPTIC: CPT | Performed by: FAMILY MEDICINE

## 2020-03-12 RX ORDER — AMOXICILLIN 875 MG/1
875 TABLET, COATED ORAL 2 TIMES DAILY
Qty: 20 TABLET | Refills: 0 | Status: SHIPPED | OUTPATIENT
Start: 2020-03-12 | End: 2020-10-27

## 2020-03-12 RX ORDER — PITAVASTATIN CALCIUM 4.18 MG/1
TABLET, FILM COATED ORAL
COMMUNITY
Start: 2019-12-19 | End: 2020-10-27 | Stop reason: SDUPTHER

## 2020-03-12 NOTE — PROGRESS NOTES
Rooming Tab(CC,VS,Pt Hx,Fall Screen)  Chief Complaint   Patient presents with   • Sore Throat       Subjective 74-year-old here complaining of a sore throat going on for a month.  She feels like she has a rash in the back of her throat.  She states she has swollen glands as well.  She has a lot of sinus congestion that she was using a Meaghan pot for that got better.  Her left sinuses were worse than her right.  She was taking Mucinex D.  She had no fever or chills.  She is had no real cough.  She is had some pressure in her face.  This is been an ongoing problem now for 3 to 4 weeks.  Sometimes it would get better then get worse.  She has not changed toothpaste or mouthwash.    I have reviewed and updated her medications, medical history and problem list during today's office visit.     Patient Care Team:  Markell Soliman MD as PCP - General (Family Medicine)    Problem List Tab  Medications Tab  Synopsis Tab  Chart Review Tab  Care Everywhere Tab  Immunizations Tab  Patient History Tab    Social History     Tobacco Use   • Smoking status: Never Smoker   • Smokeless tobacco: Never Used   Substance Use Topics   • Alcohol use: Yes     Frequency: Monthly or less     Drinks per session: 1 or 2       Review of Systems   Constitutional: Negative for chills, fatigue and fever.   HENT: Positive for congestion, sinus pressure, sore throat and swollen glands. Negative for nosebleeds.    Eyes: Negative for double vision.   Respiratory: Negative for chest tightness and shortness of breath.    Cardiovascular: Negative for chest pain and palpitations.   Gastrointestinal: Negative for blood in stool.   Genitourinary: Negative for dysuria and hematuria.   Neurological: Negative for dizziness and syncope.   Psychiatric/Behavioral: Negative for depressed mood.       Objective     Rooming Tab(CC,VS,Pt Hx,Fall Screen)  /71 (BP Location: Right arm, Patient Position: Sitting, Cuff Size: Adult)   Pulse 88   Temp 97.8 °F (36.6  °C) (Oral)   Resp 12   Wt 63.2 kg (139 lb 6.4 oz)   BMI 21.83 kg/m²     Body mass index is 21.83 kg/m².    Physical Exam   Constitutional: She is oriented to person, place, and time. She appears well-developed and well-nourished. No distress.   HENT:   Head: Normocephalic and atraumatic.   Her throat is mildly red but there is no rash that I can see.  There is no exudate.  She has minimal sinus congestion currently   Eyes: Pupils are equal, round, and reactive to light. Conjunctivae, EOM and lids are normal.   Neck: Trachea normal and normal range of motion. Neck supple. No JVD present. Carotid bruit is not present. No thyroid mass and no thyromegaly present.   No carotid bruits  She has no appreciated lymphadenopathy   Cardiovascular: Normal rate, regular rhythm, normal heart sounds and intact distal pulses.   Pulmonary/Chest: Effort normal and breath sounds normal.   Musculoskeletal:   No c/c/e   Lymphadenopathy:     She has no cervical adenopathy.   Neurological: She is alert and oriented to person, place, and time. No cranial nerve deficit.   Skin: Skin is warm and dry.   Psychiatric: She has a normal mood and affect. Her speech is normal and behavior is normal. She is attentive.   Nursing note and vitals reviewed.       Statin Choice Calculator  Data Reviewed:                   Assessment/Plan   Order Review Tab  Health Maintenance Tab  Patient Plan/Order Tab  Diagnoses and all orders for this visit:    1. Pharyngitis, unspecified etiology (Primary)  Assessment & Plan:  Strep screen is negative.  I think this is related to her sinusitis.  Will treat as discussed with amoxicillin for 10 days and if persistent we will have her see ENT    Orders:  -     POCT rapid strep A    2. Acute non-recurrent maxillary sinusitis  Assessment & Plan:  We will give amoxicillin 875 mg twice daily for 10 days.  If she is not improving I recommend she see ENT and call me back to do this.  She can go ahead and using Mucinex D as  well.      Other orders  -     amoxicillin (AMOXIL) 875 MG tablet; Take 1 tablet by mouth 2 (Two) Times a Day.  Dispense: 20 tablet; Refill: 0      Wrapup Tab  Return if symptoms worsen or fail to improve.

## 2020-03-12 NOTE — ASSESSMENT & PLAN NOTE
Strep screen is negative.  I think this is related to her sinusitis.  Will treat as discussed with amoxicillin for 10 days and if persistent we will have her see ENT

## 2020-03-12 NOTE — ASSESSMENT & PLAN NOTE
We will give amoxicillin 875 mg twice daily for 10 days.  If she is not improving I recommend she see ENT and call me back to do this.  She can go ahead and using Mucinex D as well.

## 2020-04-06 ENCOUNTER — TELEPHONE (OUTPATIENT)
Dept: FAMILY MEDICINE CLINIC | Facility: CLINIC | Age: 75
End: 2020-04-06

## 2020-04-06 NOTE — TELEPHONE ENCOUNTER
I called him and over in detail the patient's symptoms.  She is not really having any symptoms of covert 19 other than a low-grade fever and achy.  I told her she does not meet criteria for getting tested but if she starts developing a cough or shortness of breath she can go the urgent care center and be evaluated.  She could even do that now but I did not think they would test her as she has no cough or shortness of breath.  I told her she has quarantine for at least 7 days, or as long her symptoms started and has to be 3 days fever free and symptom free

## 2020-04-06 NOTE — TELEPHONE ENCOUNTER
There are no openings with you today.  Do you want to do this tomorrow?  There are plenty of open spots.  Please advise.

## 2020-04-06 NOTE — TELEPHONE ENCOUNTER
PATIENT CALLED STATING THAT SHE TRIED TO SEND A MESSAGE TO [facility] TIMES ABOUT CONCERNS THAT SHE HAS CONCERNING SYMPTOMS OF COVID-19, BUT SHE RECEIVED A MESSAGE THAT IT IS NOT A VALID ADDRESS TO SEND MESSAGES TO, SO SHE DID NOT KNOW WHAT ELSE TO DO BESIDES CONTACT HER PRIMARY CARE PROVIDER, DR. RATLIFF.    THE PATIENT STATED THAT HER NORMAL TEMPERATURE IS 97.46 AND IT HAS BEEN ELEVATED SINCE LAST WEDNESDAY (4/1/20). THE PATIENT PROVIDED HER TEMPERATURE FOR THE PAST 5 DAYS:    DATE:    TEMPERATURE:    4/1/20   98.4    4/2/20   98.6    4/3/20   98.6    4/4/20   100.5    4/5/20   99.4    4/6/20   100.5    ADDITIONALLY, THE PATIENT STATED THAT SHE STARTED GETTING A HEADACHE, FEELING VERY TIRED, AND FEELING ACHEY ON SATURDAY (4/4/20). THE PATIENT STATED THAT SHE DOES NOT HAVE A COUGH, SHE HAS NOT BEEN VOMITING, AND SHE DOES NOT HAVE SHORTNESS OF BREATH.    THE PATIENT STATED THAT SHE DOES NOT HAVE ANY KNOWN EXPOSURE TO COVID-19. THE PATIENT STATED THAT THE LAST 2 TIMES SHE LEFT HER HOUSE WAS 3/16/20 AND 3/20/20. THE PATIENT STATED THAT SHE WENT TO [facility] AND [facility] ON THESE DAYS. THE PATIENT STATED THAT THERE HAS BEEN NO ONE IN HER HOME THAT GOES IN AND OUT EITHER.    THE PATIENT STATED THAT SHE WANTS TO KNOW WHAT THE NEXT STEP SHOULD BE FOR HER.    IF NEEDED, I CONFIRMED THE CORRECT PHARMACY WITH THE PATIENT TO BE [facility] IN [place] ON [facility] (PHONE NUMBER: [phone]).    PLEASE CALL THE PATIENT [phone] WHEN THIS MESSAGE HAS BEEN RECEIVED AND ADVISE HER REGARDING THIS CONCERN.

## 2020-04-07 ENCOUNTER — TELEPHONE (OUTPATIENT)
Dept: FAMILY MEDICINE CLINIC | Facility: CLINIC | Age: 75
End: 2020-04-07

## 2020-04-07 NOTE — TELEPHONE ENCOUNTER
She will need to go to the urgent care and her care center to be evaluated, the one below my office  I am not allowed to order that test

## 2020-04-08 ENCOUNTER — TELEPHONE (OUTPATIENT)
Dept: FAMILY MEDICINE CLINIC | Facility: CLINIC | Age: 75
End: 2020-04-08

## 2020-04-08 ENCOUNTER — OFFICE VISIT (OUTPATIENT)
Dept: FAMILY MEDICINE CLINIC | Facility: CLINIC | Age: 75
End: 2020-04-08

## 2020-04-08 DIAGNOSIS — R50.9 FEBRILE ILLNESS, ACUTE: Primary | ICD-10-CM

## 2020-04-08 LAB
BASOPHILS # BLD AUTO: 0.03 10*3/MM3 (ref 0–0.2)
BASOPHILS NFR BLD AUTO: 0.3 % (ref 0–1.5)
BILIRUB UR QL STRIP: NEGATIVE
CLARITY UR: ABNORMAL
COLOR UR: YELLOW
DEPRECATED RDW RBC AUTO: 40.2 FL (ref 37–54)
EOSINOPHIL # BLD AUTO: 0.04 10*3/MM3 (ref 0–0.4)
EOSINOPHIL NFR BLD AUTO: 0.4 % (ref 0.3–6.2)
ERYTHROCYTE [DISTWIDTH] IN BLOOD BY AUTOMATED COUNT: 12.3 % (ref 12.3–15.4)
GLUCOSE UR STRIP-MCNC: NEGATIVE MG/DL
HCT VFR BLD AUTO: 34 % (ref 34–46.6)
HGB BLD-MCNC: 11.3 G/DL (ref 12–15.9)
HGB UR QL STRIP.AUTO: ABNORMAL
IMM GRANULOCYTES # BLD AUTO: 0.02 10*3/MM3 (ref 0–0.05)
IMM GRANULOCYTES NFR BLD AUTO: 0.2 % (ref 0–0.5)
KETONES UR QL STRIP: ABNORMAL
LEUKOCYTE ESTERASE UR QL STRIP.AUTO: ABNORMAL
LYMPHOCYTES # BLD AUTO: 1.37 10*3/MM3 (ref 0.7–3.1)
LYMPHOCYTES NFR BLD AUTO: 14.6 % (ref 19.6–45.3)
MCH RBC QN AUTO: 29.5 PG (ref 26.6–33)
MCHC RBC AUTO-ENTMCNC: 33.2 G/DL (ref 31.5–35.7)
MCV RBC AUTO: 88.8 FL (ref 79–97)
MONOCYTES # BLD AUTO: 1.1 10*3/MM3 (ref 0.1–0.9)
MONOCYTES NFR BLD AUTO: 11.7 % (ref 5–12)
NEUTROPHILS # BLD AUTO: 6.81 10*3/MM3 (ref 1.7–7)
NEUTROPHILS NFR BLD AUTO: 72.8 % (ref 42.7–76)
NITRITE UR QL STRIP: POSITIVE
NRBC BLD AUTO-RTO: 0 /100 WBC (ref 0–0.2)
PH UR STRIP.AUTO: 6.5 [PH] (ref 5–8)
PLATELET # BLD AUTO: 243 10*3/MM3 (ref 140–450)
PMV BLD AUTO: 11.6 FL (ref 6–12)
PROT UR QL STRIP: ABNORMAL
RBC # BLD AUTO: 3.83 10*6/MM3 (ref 3.77–5.28)
SP GR UR STRIP: 1.01 (ref 1–1.03)
UROBILINOGEN UR QL STRIP: ABNORMAL
WBC NRBC COR # BLD: 9.37 10*3/MM3 (ref 3.4–10.8)

## 2020-04-08 PROCEDURE — 87186 SC STD MICRODIL/AGAR DIL: CPT | Performed by: FAMILY MEDICINE

## 2020-04-08 PROCEDURE — 85025 COMPLETE CBC W/AUTO DIFF WBC: CPT | Performed by: FAMILY MEDICINE

## 2020-04-08 PROCEDURE — 81001 URINALYSIS AUTO W/SCOPE: CPT | Performed by: FAMILY MEDICINE

## 2020-04-08 PROCEDURE — 80053 COMPREHEN METABOLIC PANEL: CPT | Performed by: FAMILY MEDICINE

## 2020-04-08 PROCEDURE — 87086 URINE CULTURE/COLONY COUNT: CPT | Performed by: FAMILY MEDICINE

## 2020-04-08 PROCEDURE — 87088 URINE BACTERIA CULTURE: CPT | Performed by: FAMILY MEDICINE

## 2020-04-08 PROCEDURE — 99213 OFFICE O/P EST LOW 20 MIN: CPT | Performed by: FAMILY MEDICINE

## 2020-04-08 PROCEDURE — 87147 CULTURE TYPE IMMUNOLOGIC: CPT | Performed by: FAMILY MEDICINE

## 2020-04-08 NOTE — TELEPHONE ENCOUNTER
Pt called in for appointment. States she has been running a temp since 3/31/20. When asked readings, she states 3/31, 4/1 - 98.6. Then 4/4 and 4/6 - 105, 4/7 at 6:00 pm 103 with severe chills, 6:45 pm 106.6, 7:00 pm 103.0, 8:00 pm 101.0. No cough, no SOB, no nausea, no diarrhea. Pt feels it's a possible UTI and when asked her symptoms, she states urine is cloudy. Do you want appointment scheduled? She was wanting seen today. Or do you want virtual visit scheduled today? Thank you.

## 2020-04-08 NOTE — PROGRESS NOTES
Rooming Tab(CC,VS,Pt Hx,Fall Screen)  No chief complaint on file.      Subjective 74-year-old here stating that she started with a fever on March 31 and states her temperature is 98.5 and that is a fever for her.  She had associated chills Reiger's and could not stop shaking.  She then stated on April 4 that she had a temperature of 100.5 with the same chills Reiger's and fever.  She states that her temperature yesterday at 645 was 106.  She states later in the evening it was down to 103 and again she had fever chills.  She has had no cough or shortness of breath sore throat runny nose congested nose diarrhea dysuria.  She has had no urgency frequency but states her urine was odorous and cloudy.  She is had no rash or tick bites.  She feels very achy but does not really complain of nausea or vomiting.  She has some lower abdominal discomfort but thinks that is more from constipation.  She has no chest pain or not much in the way of headache either.  Either way she came into the office today.  So I discussed with her her real temperature started on April 4 because 98.5 is not a fever for anyone.    I have reviewed and updated her medications, medical history and problem list during today's office visit.     Patient Care Team:  Markell Soliman MD as PCP - General (Family Medicine)    Problem List Tab  Medications Tab  Synopsis Tab  Chart Review Tab  Care Everywhere Tab  Immunizations Tab  Patient History Tab    Social History     Tobacco Use   • Smoking status: Never Smoker   • Smokeless tobacco: Never Used   Substance Use Topics   • Alcohol use: Yes     Frequency: Monthly or less     Drinks per session: 1 or 2       Review of Systems   Constitutional: Positive for fatigue. Negative for chills and fever.   HENT: Negative for congestion and sinus pressure.    Respiratory: Negative for cough, chest tightness and shortness of breath.    Cardiovascular: Negative for chest pain.   Gastrointestinal: Negative for abdominal  pain and diarrhea.   Genitourinary: Negative for dysuria, flank pain and hematuria.   Skin: Negative for rash.       Objective     Rooming Tab(CC,VS,Pt Hx,Fall Screen)  There were no vitals taken for this visit.    There is no height or weight on file to calculate BMI.    Physical Exam   Constitutional: She is oriented to person, place, and time. She appears well-developed and well-nourished. No distress.   She does not look to be actually very ill at all, she has no fever and is in no distress whatsoever   HENT:   Head: Normocephalic and atraumatic.   Nose: Nose normal.   Mouth/Throat: Oropharynx is clear and moist.   Eyes: Pupils are equal, round, and reactive to light. Conjunctivae, EOM and lids are normal.   Neck: Trachea normal and normal range of motion. Neck supple. No JVD present. Carotid bruit is not present. No thyroid mass and no thyromegaly present.   No carotid bruits   Cardiovascular: Normal rate, regular rhythm, normal heart sounds and intact distal pulses.   Pulmonary/Chest: Effort normal and breath sounds normal.   Abdominal: Soft. Bowel sounds are normal. She exhibits no distension and no mass. There is no tenderness. There is no guarding.   Musculoskeletal:   No c/c/e   Neurological: She is alert and oriented to person, place, and time. No cranial nerve deficit.   Skin: Skin is warm and dry.   Psychiatric: She has a normal mood and affect. Her speech is normal and behavior is normal. She is attentive.   Nursing note and vitals reviewed.       Statin Choice Calculator  Data Reviewed:                   Assessment/Plan   Order Review Tab  Health Maintenance Tab  Patient Plan/Order Tab  Diagnoses and all orders for this visit:    1. Febrile illness, acute (Primary)  Assessment & Plan:  I do not know the etiology of the patient's febrile illness, she has not having a fever now and she looks absolutely fine.  We will check a CBC is a urinalysis and a CMP.  I cannot test her for corona as she does not  meet criteria.  I told her she is to go on quarantine and will have to stay on quarantine at least 7 days from the start of her symptoms and has to be fever free for at least 3 days and asymptomatic before she is out of quarantine.  If they change the criteria than maybe we can get her tested.  It would be very nice to be able to do this.  We can observe for other symptoms for now.  She is to avoid any family members    Orders:  -     CBC & Differential  -     Comprehensive Metabolic Panel  -     Urinalysis With Culture If Indicated - Urine, Clean Catch  -     CBC Auto Differential      Wrapup Tab  Return if symptoms worsen or fail to improve.

## 2020-04-08 NOTE — ASSESSMENT & PLAN NOTE
I do not know the etiology of the patient's febrile illness, she has not having a fever now and she looks absolutely fine.  We will check a CBC is a urinalysis and a CMP.  I cannot test her for corona as she does not meet criteria.  I told her she is to go on quarantine and will have to stay on quarantine at least 7 days from the start of her symptoms and has to be fever free for at least 3 days and asymptomatic before she is out of quarantine.  If they change the criteria than maybe we can get her tested.  It would be very nice to be able to do this.  We can observe for other symptoms for now.  She is to avoid any family members

## 2020-04-09 DIAGNOSIS — N10 ACUTE PYELONEPHRITIS: Primary | ICD-10-CM

## 2020-04-09 LAB
ALBUMIN SERPL-MCNC: 3.8 G/DL (ref 3.5–5.2)
ALBUMIN/GLOB SERPL: 1.3 G/DL
ALP SERPL-CCNC: 100 U/L (ref 39–117)
ALT SERPL W P-5'-P-CCNC: 29 U/L (ref 1–33)
ANION GAP SERPL CALCULATED.3IONS-SCNC: 10.3 MMOL/L (ref 5–15)
AST SERPL-CCNC: 29 U/L (ref 1–32)
BACTERIA UR QL AUTO: ABNORMAL /HPF
BILIRUB SERPL-MCNC: 0.5 MG/DL (ref 0.2–1.2)
BUN BLD-MCNC: 10 MG/DL (ref 8–23)
BUN/CREAT SERPL: 15.9 (ref 7–25)
CALCIUM SPEC-SCNC: 9 MG/DL (ref 8.6–10.5)
CHLORIDE SERPL-SCNC: 97 MMOL/L (ref 98–107)
CO2 SERPL-SCNC: 26.7 MMOL/L (ref 22–29)
CREAT BLD-MCNC: 0.63 MG/DL (ref 0.57–1)
GFR SERPL CREATININE-BSD FRML MDRD: 112 ML/MIN/1.73
GFR SERPL CREATININE-BSD FRML MDRD: 92 ML/MIN/1.73
GLOBULIN UR ELPH-MCNC: 2.9 GM/DL
GLUCOSE BLD-MCNC: 100 MG/DL (ref 65–99)
HYALINE CASTS UR QL AUTO: ABNORMAL /LPF
POTASSIUM BLD-SCNC: 4.6 MMOL/L (ref 3.5–5.2)
PROT SERPL-MCNC: 6.7 G/DL (ref 6–8.5)
RBC # UR: ABNORMAL /HPF
REF LAB TEST METHOD: ABNORMAL
SODIUM BLD-SCNC: 134 MMOL/L (ref 136–145)
SQUAMOUS #/AREA URNS HPF: ABNORMAL /HPF
WBC UR QL AUTO: ABNORMAL /HPF

## 2020-04-09 RX ORDER — CIPROFLOXACIN 500 MG/1
500 TABLET, FILM COATED ORAL 2 TIMES DAILY
Qty: 28 TABLET | Refills: 0 | Status: SHIPPED | OUTPATIENT
Start: 2020-04-09 | End: 2020-06-24

## 2020-04-09 RX ORDER — OCTISALATE, AVOBENZONE, HOMOSALATE, AND OCTOCRYLENE 29.4; 29.4; 49; 25.48 MG/ML; MG/ML; MG/ML; MG/ML
4 LOTION TOPICAL DAILY
Qty: 14 CAPSULE | Refills: 0 | Status: SHIPPED | OUTPATIENT
Start: 2020-04-09

## 2020-04-11 LAB
BACTERIA SPEC AEROBE CULT: ABNORMAL
BACTERIA SPEC AEROBE CULT: ABNORMAL

## 2020-04-13 DIAGNOSIS — N12 PYELONEPHRITIS: Primary | ICD-10-CM

## 2020-04-22 ENCOUNTER — LAB (OUTPATIENT)
Dept: FAMILY MEDICINE CLINIC | Facility: CLINIC | Age: 75
End: 2020-04-22

## 2020-04-22 DIAGNOSIS — N10 ACUTE PYELONEPHRITIS: ICD-10-CM

## 2020-04-22 DIAGNOSIS — N12 PYELONEPHRITIS: ICD-10-CM

## 2020-04-22 LAB
BILIRUB UR QL STRIP: NEGATIVE
CLARITY UR: CLEAR
COLOR UR: YELLOW
GLUCOSE UR STRIP-MCNC: NEGATIVE MG/DL
HGB UR QL STRIP.AUTO: NEGATIVE
KETONES UR QL STRIP: NEGATIVE
LEUKOCYTE ESTERASE UR QL STRIP.AUTO: NEGATIVE
NITRITE UR QL STRIP: NEGATIVE
PH UR STRIP.AUTO: 7 [PH] (ref 5–8)
PROT UR QL STRIP: NEGATIVE
SP GR UR STRIP: 1.01 (ref 1–1.03)
UROBILINOGEN UR QL STRIP: NORMAL

## 2020-04-22 PROCEDURE — 81003 URINALYSIS AUTO W/O SCOPE: CPT | Performed by: FAMILY MEDICINE

## 2020-06-10 ENCOUNTER — TELEPHONE (OUTPATIENT)
Dept: FAMILY MEDICINE CLINIC | Facility: CLINIC | Age: 75
End: 2020-06-10

## 2020-06-10 DIAGNOSIS — N30.00 ACUTE CYSTITIS WITHOUT HEMATURIA: Primary | ICD-10-CM

## 2020-06-10 NOTE — TELEPHONE ENCOUNTER
PATIENT CALLED STATING IS HAVING SYMPTOMS OF AN UTI AGAIN. WOULD LIKE TO BRING A URINE SAMPLE. RODOLFO AT THE OFFICE ADVISED ME TO PUT A TELEPHONE CALL FOR DR RATLIFF SO CAN PUT AN ORDER AND CALL PATIENT TO LET HER KNOW WHEN SHE CAN GIVE US A URINE SAMPLE.    PLEASE CB PT WHEN ORDER IS IN AND ADVISE TIME SHE CAN COME IN TO GIVE URINE SAMPLE    CB#: [phone]

## 2020-06-24 ENCOUNTER — OFFICE VISIT (OUTPATIENT)
Dept: FAMILY MEDICINE CLINIC | Facility: CLINIC | Age: 75
End: 2020-06-24

## 2020-06-24 VITALS
TEMPERATURE: 98.7 F | RESPIRATION RATE: 16 BRPM | WEIGHT: 135.4 LBS | HEART RATE: 93 BPM | OXYGEN SATURATION: 98 % | DIASTOLIC BLOOD PRESSURE: 76 MMHG | BODY MASS INDEX: 21.21 KG/M2 | SYSTOLIC BLOOD PRESSURE: 140 MMHG

## 2020-06-24 DIAGNOSIS — N39.0 URINARY TRACT INFECTION WITH HEMATURIA, SITE UNSPECIFIED: Primary | ICD-10-CM

## 2020-06-24 DIAGNOSIS — R31.9 URINARY TRACT INFECTION WITH HEMATURIA, SITE UNSPECIFIED: Primary | ICD-10-CM

## 2020-06-24 LAB
BILIRUB BLD-MCNC: NEGATIVE MG/DL
CLARITY, POC: CLEAR
COLOR UR: ABNORMAL
GLUCOSE UR STRIP-MCNC: NEGATIVE MG/DL
KETONES UR QL: NEGATIVE
LEUKOCYTE EST, POC: ABNORMAL
NITRITE UR-MCNC: POSITIVE MG/ML
PH UR: 7 [PH] (ref 5–8)
PROT UR STRIP-MCNC: ABNORMAL MG/DL
RBC # UR STRIP: ABNORMAL /UL
SP GR UR: 1.01 (ref 1–1.03)
UROBILINOGEN UR QL: NORMAL

## 2020-06-24 PROCEDURE — 87086 URINE CULTURE/COLONY COUNT: CPT | Performed by: NURSE PRACTITIONER

## 2020-06-24 PROCEDURE — 99213 OFFICE O/P EST LOW 20 MIN: CPT | Performed by: NURSE PRACTITIONER

## 2020-06-24 PROCEDURE — 87147 CULTURE TYPE IMMUNOLOGIC: CPT | Performed by: NURSE PRACTITIONER

## 2020-06-24 PROCEDURE — 81003 URINALYSIS AUTO W/O SCOPE: CPT | Performed by: NURSE PRACTITIONER

## 2020-06-24 RX ORDER — CIPROFLOXACIN 500 MG/1
500 TABLET, FILM COATED ORAL 2 TIMES DAILY
Qty: 20 TABLET | Refills: 0 | Status: SHIPPED | OUTPATIENT
Start: 2020-06-24 | End: 2020-10-27

## 2020-06-24 NOTE — PROGRESS NOTES
Subjective   Helen Rousseau is a 75 y.o. female.     Chief Complaint   Patient presents with   • Blood in Urine       /76 (BP Location: Left arm, Patient Position: Sitting, Cuff Size: Adult)   Pulse 93   Temp 98.7 °F (37.1 °C) (Temporal)   Resp 16   Wt 61.4 kg (135 lb 6.4 oz)   SpO2 98%   BMI 21.21 kg/m²     BP Readings from Last 3 Encounters:   06/24/20 140/76   03/12/20 129/71   10/25/19 144/73       Wt Readings from Last 3 Encounters:   06/24/20 61.4 kg (135 lb 6.4 oz)   03/12/20 63.2 kg (139 lb 6.4 oz)   10/25/19 61.1 kg (134 lb 12.8 oz)       Pt comes in today with c/o blood in urine. Woke up this morning after she noticed blood in urine.   She states about 2 weeks ago she thought she might have had a UTI and was going to drop off a UA, but was told she couldn't.   No frequency, but has had urgency. No dysuria.   Urine has looked cloudy.   No fever or chills.   No N/V/D.     Blood in Urine   Irritative symptoms do not include frequency or urgency. Pertinent negatives include no chills, dysuria, fever or flank pain.        The following portions of the patient's history were reviewed and updated as appropriate: allergies, current medications, past family history, past medical history, past social history, past surgical history and problem list.    Review of Systems   Constitutional: Negative for chills, fatigue and fever.   Genitourinary: Positive for hematuria. Negative for dysuria, flank pain, frequency, pelvic pressure, urgency and urinary incontinence.       Objective   Physical Exam   Constitutional: She is oriented to person, place, and time. She appears well-developed and well-nourished.   Eyes: Pupils are equal, round, and reactive to light.   Cardiovascular: Normal rate and regular rhythm.   Pulmonary/Chest: Effort normal and breath sounds normal.   Musculoskeletal:   No CVT    Neurological: She is alert and oriented to person, place, and time.         Diagnoses and all orders for this  visit:    1. Urinary tract infection with hematuria, site unspecified (Primary)  -     Urine Culture - Urine, Urine, Clean Catch; Future  -     POCT urinalysis dipstick, automated  -     ciprofloxacin (Cipro) 500 MG tablet; Take 1 tablet by mouth 2 (Two) Times a Day.  Dispense: 20 tablet; Refill: 0    culture urine  Will start cipro  During this office visit, we discussed the pertinent aspects of the visit and treatment recommendations. Pt verbalizes understanding. Follow up was discussed. Patient was given the opportunity to ask questions and discuss other concerns.       Return if symptoms worsen or fail to improve.

## 2020-06-25 LAB
BACTERIA SPEC AEROBE CULT: ABNORMAL
STREP GROUPING: ABNORMAL

## 2020-06-30 ENCOUNTER — TELEPHONE (OUTPATIENT)
Dept: FAMILY MEDICINE CLINIC | Facility: CLINIC | Age: 75
End: 2020-06-30

## 2020-06-30 NOTE — TELEPHONE ENCOUNTER
Please call pt and see how she is doing with her UTI.   Culture came back with group B strep. May need to switch anbx to amoxil.

## 2020-06-30 NOTE — TELEPHONE ENCOUNTER
Spoke with pt coming up Friday to do another Urine dip. Please place her on the [facility]. [facility]. For that. She says it seems okay but she didn't really have symptons in the last visit expect for the blood. We will check it for the blood.

## 2020-06-30 NOTE — TELEPHONE ENCOUNTER
Spoke with  at 3:46pm and patient is coming in Monday 7/6 at 10:30am under [facility] [place] [place] for the U/A.  We are closed on Friday 7/3.

## 2020-07-06 ENCOUNTER — LAB (OUTPATIENT)
Dept: FAMILY MEDICINE CLINIC | Facility: CLINIC | Age: 75
End: 2020-07-06

## 2020-07-06 LAB
BACTERIA UR QL AUTO: NORMAL /HPF
BILIRUB UR QL STRIP: NEGATIVE
CLARITY UR: CLEAR
COLOR UR: YELLOW
GLUCOSE UR STRIP-MCNC: NEGATIVE MG/DL
HGB UR QL STRIP.AUTO: NEGATIVE
HYALINE CASTS UR QL AUTO: NORMAL /LPF
KETONES UR QL STRIP: NEGATIVE
LEUKOCYTE ESTERASE UR QL STRIP.AUTO: ABNORMAL
NITRITE UR QL STRIP: NEGATIVE
PH UR STRIP.AUTO: 7.5 [PH] (ref 5–8)
PROT UR QL STRIP: NEGATIVE
RBC # UR: NORMAL /HPF
REF LAB TEST METHOD: NORMAL
SP GR UR STRIP: 1.01 (ref 1–1.03)
SQUAMOUS #/AREA URNS HPF: NORMAL /HPF
UROBILINOGEN UR QL STRIP: ABNORMAL
WBC UR QL AUTO: NORMAL /HPF

## 2020-07-06 PROCEDURE — 81001 URINALYSIS AUTO W/SCOPE: CPT | Performed by: FAMILY MEDICINE

## 2020-08-14 ENCOUNTER — TELEPHONE (OUTPATIENT)
Dept: FAMILY MEDICINE CLINIC | Facility: CLINIC | Age: 75
End: 2020-08-14

## 2020-08-14 NOTE — TELEPHONE ENCOUNTER
I called and left a message that she does not seem to have any other symptoms.  At this point I would see how things go over the weekend and if she develops further symptoms we could do a COVID-19 test or if she gets worse she can be evaluated

## 2020-08-14 NOTE — TELEPHONE ENCOUNTER
Patient called in stating she has had a low grade fever all week, she's not having any other symptoms and doesn't know what to do.    Best call back # [phone]

## 2020-08-21 ENCOUNTER — CLINICAL SUPPORT (OUTPATIENT)
Dept: FAMILY MEDICINE CLINIC | Facility: CLINIC | Age: 75
End: 2020-08-21

## 2020-08-21 DIAGNOSIS — R31.9 URINARY TRACT INFECTION WITH HEMATURIA, SITE UNSPECIFIED: Primary | ICD-10-CM

## 2020-08-21 DIAGNOSIS — N39.0 URINARY TRACT INFECTION WITH HEMATURIA, SITE UNSPECIFIED: Primary | ICD-10-CM

## 2020-08-21 LAB
BILIRUB BLD-MCNC: NEGATIVE MG/DL
CLARITY, POC: CLEAR
COLOR UR: YELLOW
GLUCOSE UR STRIP-MCNC: NEGATIVE MG/DL
KETONES UR QL: NEGATIVE
LEUKOCYTE EST, POC: NEGATIVE
NITRITE UR-MCNC: NEGATIVE MG/ML
PH UR: 7.5 [PH] (ref 5–8)
PROT UR STRIP-MCNC: NEGATIVE MG/DL
RBC # UR STRIP: NEGATIVE /UL
SP GR UR: 1.02 (ref 1–1.03)
UROBILINOGEN UR QL: NORMAL

## 2020-08-21 PROCEDURE — 81003 URINALYSIS AUTO W/O SCOPE: CPT | Performed by: FAMILY MEDICINE

## 2020-08-24 NOTE — PROGRESS NOTES
Patient said just a little irritation and more of a color change. She said it has turned to a lemon yellow color

## 2020-10-13 ENCOUNTER — TELEPHONE (OUTPATIENT)
Dept: FAMILY MEDICINE CLINIC | Facility: CLINIC | Age: 75
End: 2020-10-13

## 2020-10-20 DIAGNOSIS — E03.9 HYPOTHYROIDISM, UNSPECIFIED TYPE: Primary | ICD-10-CM

## 2020-10-20 DIAGNOSIS — I10 ESSENTIAL HYPERTENSION: ICD-10-CM

## 2020-10-20 DIAGNOSIS — E78.5 HYPERLIPIDEMIA, UNSPECIFIED HYPERLIPIDEMIA TYPE: ICD-10-CM

## 2020-10-21 ENCOUNTER — LAB (OUTPATIENT)
Dept: FAMILY MEDICINE CLINIC | Facility: CLINIC | Age: 75
End: 2020-10-21

## 2020-10-21 DIAGNOSIS — E03.9 HYPOTHYROIDISM, UNSPECIFIED TYPE: ICD-10-CM

## 2020-10-21 DIAGNOSIS — I10 ESSENTIAL HYPERTENSION: ICD-10-CM

## 2020-10-21 DIAGNOSIS — E78.5 HYPERLIPIDEMIA, UNSPECIFIED HYPERLIPIDEMIA TYPE: ICD-10-CM

## 2020-10-21 LAB
ALBUMIN SERPL-MCNC: 4.1 G/DL (ref 3.5–5.2)
ALBUMIN/GLOB SERPL: 2.2 G/DL
ALP SERPL-CCNC: 60 U/L (ref 39–117)
ALT SERPL W P-5'-P-CCNC: 27 U/L (ref 1–33)
ANION GAP SERPL CALCULATED.3IONS-SCNC: 7.5 MMOL/L (ref 5–15)
AST SERPL-CCNC: 27 U/L (ref 1–32)
BASOPHILS # BLD AUTO: 0.04 10*3/MM3 (ref 0–0.2)
BASOPHILS NFR BLD AUTO: 0.9 % (ref 0–1.5)
BILIRUB SERPL-MCNC: 0.4 MG/DL (ref 0–1.2)
BUN SERPL-MCNC: 19 MG/DL (ref 8–23)
BUN/CREAT SERPL: 27.9 (ref 7–25)
CALCIUM SPEC-SCNC: 9.3 MG/DL (ref 8.6–10.5)
CHLORIDE SERPL-SCNC: 107 MMOL/L (ref 98–107)
CHOLEST SERPL-MCNC: 151 MG/DL (ref 0–200)
CO2 SERPL-SCNC: 28.5 MMOL/L (ref 22–29)
CREAT SERPL-MCNC: 0.68 MG/DL (ref 0.57–1)
DEPRECATED RDW RBC AUTO: 39.3 FL (ref 37–54)
EOSINOPHIL # BLD AUTO: 0.13 10*3/MM3 (ref 0–0.4)
EOSINOPHIL NFR BLD AUTO: 3.1 % (ref 0.3–6.2)
ERYTHROCYTE [DISTWIDTH] IN BLOOD BY AUTOMATED COUNT: 12.2 % (ref 12.3–15.4)
GFR SERPL CREATININE-BSD FRML MDRD: 102 ML/MIN/1.73
GFR SERPL CREATININE-BSD FRML MDRD: 84 ML/MIN/1.73
GLOBULIN UR ELPH-MCNC: 1.9 GM/DL
GLUCOSE SERPL-MCNC: 85 MG/DL (ref 65–99)
HCT VFR BLD AUTO: 35.8 % (ref 34–46.6)
HDLC SERPL-MCNC: 47 MG/DL (ref 40–60)
HGB BLD-MCNC: 11.8 G/DL (ref 12–15.9)
IMM GRANULOCYTES # BLD AUTO: 0.01 10*3/MM3 (ref 0–0.05)
IMM GRANULOCYTES NFR BLD AUTO: 0.2 % (ref 0–0.5)
LDLC SERPL CALC-MCNC: 91 MG/DL (ref 0–100)
LDLC/HDLC SERPL: 1.93 {RATIO}
LYMPHOCYTES # BLD AUTO: 1.33 10*3/MM3 (ref 0.7–3.1)
LYMPHOCYTES NFR BLD AUTO: 31.5 % (ref 19.6–45.3)
MCH RBC QN AUTO: 29.2 PG (ref 26.6–33)
MCHC RBC AUTO-ENTMCNC: 33 G/DL (ref 31.5–35.7)
MCV RBC AUTO: 88.6 FL (ref 79–97)
MONOCYTES # BLD AUTO: 0.41 10*3/MM3 (ref 0.1–0.9)
MONOCYTES NFR BLD AUTO: 9.7 % (ref 5–12)
NEUTROPHILS NFR BLD AUTO: 2.3 10*3/MM3 (ref 1.7–7)
NEUTROPHILS NFR BLD AUTO: 54.6 % (ref 42.7–76)
NRBC BLD AUTO-RTO: 0 /100 WBC (ref 0–0.2)
PLATELET # BLD AUTO: 258 10*3/MM3 (ref 140–450)
PMV BLD AUTO: 10.6 FL (ref 6–12)
POTASSIUM SERPL-SCNC: 4.4 MMOL/L (ref 3.5–5.2)
PROT SERPL-MCNC: 6 G/DL (ref 6–8.5)
RBC # BLD AUTO: 4.04 10*6/MM3 (ref 3.77–5.28)
SODIUM SERPL-SCNC: 143 MMOL/L (ref 136–145)
T3FREE SERPL-MCNC: 3.29 PG/ML (ref 2–4.4)
T4 FREE SERPL-MCNC: 1.56 NG/DL (ref 0.93–1.7)
TRIGL SERPL-MCNC: 66 MG/DL (ref 0–150)
TSH SERPL DL<=0.05 MIU/L-ACNC: 0.03 UIU/ML (ref 0.27–4.2)
VLDLC SERPL-MCNC: 13 MG/DL (ref 5–40)
WBC # BLD AUTO: 4.22 10*3/MM3 (ref 3.4–10.8)

## 2020-10-21 PROCEDURE — 85025 COMPLETE CBC W/AUTO DIFF WBC: CPT | Performed by: FAMILY MEDICINE

## 2020-10-21 PROCEDURE — 84481 FREE ASSAY (FT-3): CPT | Performed by: FAMILY MEDICINE

## 2020-10-21 PROCEDURE — 84439 ASSAY OF FREE THYROXINE: CPT | Performed by: FAMILY MEDICINE

## 2020-10-21 PROCEDURE — 80061 LIPID PANEL: CPT | Performed by: FAMILY MEDICINE

## 2020-10-21 PROCEDURE — 36415 COLL VENOUS BLD VENIPUNCTURE: CPT

## 2020-10-21 PROCEDURE — 80053 COMPREHEN METABOLIC PANEL: CPT | Performed by: FAMILY MEDICINE

## 2020-10-21 PROCEDURE — 84443 ASSAY THYROID STIM HORMONE: CPT | Performed by: FAMILY MEDICINE

## 2020-10-27 ENCOUNTER — OFFICE VISIT (OUTPATIENT)
Dept: FAMILY MEDICINE CLINIC | Facility: CLINIC | Age: 75
End: 2020-10-27

## 2020-10-27 DIAGNOSIS — R14.0 FLATULENCE/GAS PAIN/BELCHING: ICD-10-CM

## 2020-10-27 DIAGNOSIS — L84 CALLUS OF FOOT: ICD-10-CM

## 2020-10-27 DIAGNOSIS — D64.9 ANEMIA, UNSPECIFIED TYPE: ICD-10-CM

## 2020-10-27 DIAGNOSIS — Z12.31 BREAST CANCER SCREENING BY MAMMOGRAM: ICD-10-CM

## 2020-10-27 DIAGNOSIS — Z00.00 MEDICARE ANNUAL WELLNESS VISIT, SUBSEQUENT: Primary | ICD-10-CM

## 2020-10-27 PROCEDURE — G0439 PPPS, SUBSEQ VISIT: HCPCS | Performed by: FAMILY MEDICINE

## 2020-10-27 RX ORDER — PITAVASTATIN CALCIUM 4.18 MG/1
4 TABLET, FILM COATED ORAL DAILY
Qty: 90 TABLET | Refills: 3 | Status: SHIPPED | OUTPATIENT
Start: 2020-10-27 | End: 2020-11-19

## 2020-10-27 RX ORDER — LEVOTHYROXINE SODIUM 0.07 MG/1
75 TABLET ORAL DAILY
Qty: 90 TABLET | Refills: 3 | Status: SHIPPED | OUTPATIENT
Start: 2020-10-27 | End: 2020-11-19

## 2020-10-27 RX ORDER — DICLOFENAC SODIUM 75 MG/1
75 TABLET, DELAYED RELEASE ORAL 2 TIMES DAILY
Qty: 180 TABLET | Refills: 3 | Status: SHIPPED | OUTPATIENT
Start: 2020-10-27 | End: 2020-11-19

## 2020-10-27 RX ORDER — OMEPRAZOLE 40 MG/1
40 CAPSULE, DELAYED RELEASE ORAL DAILY
Qty: 90 CAPSULE | Refills: 3 | Status: SHIPPED | OUTPATIENT
Start: 2020-10-27 | End: 2020-11-19

## 2020-10-27 RX ORDER — LIOTHYRONINE SODIUM 5 UG/1
TABLET ORAL
Qty: 180 TABLET | Refills: 3 | Status: SHIPPED | OUTPATIENT
Start: 2020-10-27 | End: 2020-11-03

## 2020-10-27 NOTE — PROGRESS NOTES
The ABCs of the Annual Wellness Visit  Subsequent Medicare Wellness Visit    Chief Complaint   Patient presents with   • Medicare Wellness-subsequent       Subjective   History of Present Illness:  Helen Rousseau is a 75 y.o. female who presents for a Subsequent Medicare Wellness Visit.  Patient walks 3 times a week with no chest pain or dizziness or syncope.  She has no BURGESS.  She probably walks a mile or more when she does it.  She states she has a history of constipation and but has no blood in her stool.  She denies any blood in her urine.  She tries to eat a nutritional diet and avoid too many carbohydrates.  She has a good memory and has no issues taking care of her financials ADLs and transportation.  She is taking Livalo 1 mg daily and try to take 2 mg daily but did not tolerate it because of muscle aches.  She seems to do well with the 1 mg.  She is having some potential stomach issues and gas and belching and burping and flatus.  She is taking Voltaren.    HEALTH RISK ASSESSMENT    Recent Hospitalizations:  No hospitalization(s) within the last year.    Current Medical Providers:  Patient Care Team:  Markell Soliman MD as PCP - General (Family Medicine)    Smoking Status:  Social History     Tobacco Use   Smoking Status Never Smoker   Smokeless Tobacco Never Used       Alcohol Consumption:  Social History     Substance and Sexual Activity   Alcohol Use Yes   • Frequency: Monthly or less   • Drinks per session: 1 or 2       Depression Screen:   PHQ-2/PHQ-9 Depression Screening 10/27/2020   Little interest or pleasure in doing things 0   Feeling down, depressed, or hopeless 0   Total Score 0       Fall Risk Screen:  STEADI Fall Risk Assessment was completed, and patient is at LOW risk for falls.Assessment completed on:10/27/2020    Health Habits and Functional and Cognitive Screening:  Functional & Cognitive Status 10/27/2020   Do you have difficulty preparing food and eating? No   Do you have difficulty  bathing yourself, getting dressed or grooming yourself? No   Do you have difficulty using the toilet? No   Do you have difficulty moving around from place to place? No   Do you have trouble with steps or getting out of a bed or a chair? No   Current Diet Well Balanced Diet   Dental Exam Up to date   Eye Exam Up to date   Exercise (times per week) 3 times per week   Current Exercise Activities Include Walking   Do you need help using the phone?  No   Are you deaf or do you have serious difficulty hearing?  No   Do you need help with transportation? No   Do you need help shopping? No   Do you need help preparing meals?  No   Do you need help with housework?  No   Do you need help with laundry? No   Do you need help taking your medications? No   Do you need help managing money? No   Do you ever drive or ride in a car without wearing a seat belt? No   Have you felt unusual stress, anger or loneliness in the last month? No   Who do you live with? Spouse   If you need help, do you have trouble finding someone available to you? No   Do you have difficulty concentrating, remembering or making decisions? Yes         Does the patient have evidence of cognitive impairment? No    Asprin use counseling:Taking ASA appropriately as indicated    Age-appropriate Screening Schedule:  Refer to the list below for future screening recommendations based on patient's age, sex and/or medical conditions. Orders for these recommended tests are listed in the plan section. The patient has been provided with a written plan.    Health Maintenance   Topic Date Due   • TDAP/TD VACCINES (1 - Tdap) 05/27/1964   • ZOSTER VACCINE (2 of 3) 11/28/2014   • INFLUENZA VACCINE  08/01/2020   • LIPID PANEL  10/21/2021          The following portions of the patient's history were reviewed and updated as appropriate: allergies, current medications, past family history, past medical history, past social history, past surgical history and problem  list.    Outpatient Medications Prior to Visit   Medication Sig Dispense Refill   • aspirin 81 MG tablet 81 mg.     • Calcium Carbonate (CALTRATE 600) 1500 (600 Ca) MG tablet Take 1 tablet by mouth Daily. 60 tablet 11   • cetirizine (ZYRTEC ALLERGY) 10 MG tablet ZYRTEC ALLERGY 10 MG TABS     • Coenzyme Q10 300 MG capsule 300 mg.     • Multiple Vitamin (MULTIVITAMIN) capsule MULTIVITAMINS CAPS     • Probiotic Product (ALIGN) 4 MG capsule Take 4 mg by mouth Daily. 14 capsule 0   • promethazine (PHENERGAN) 25 MG suppository Insert 1 suppository into the rectum Every 6 (Six) Hours As Needed for Nausea or Vomiting. 12 suppository 0   • amoxicillin (AMOXIL) 875 MG tablet Take 1 tablet by mouth 2 (Two) Times a Day. 20 tablet 0   • ciprofloxacin (Cipro) 500 MG tablet Take 1 tablet by mouth 2 (Two) Times a Day. 20 tablet 0   • diclofenac (VOLTAREN) 75 MG EC tablet Take 1 tablet by mouth 2 (Two) Times a Day. 180 tablet 3   • levothyroxine (SYNTHROID, LEVOTHROID) 75 MCG tablet Take 1 tablet by mouth Daily. 90 tablet 3   • liothyronine (CYTOMEL) 5 MCG tablet 2 po q d 180 tablet 3   • LIVALO 4 MG tablet      • omeprazole (priLOSEC) 40 MG capsule Take 1 capsule by mouth Daily. 90 capsule 3     No facility-administered medications prior to visit.        Patient Active Problem List   Diagnosis   • Arthritis of foot   • Atrial fibrillation (CMS/HCC)   • Bell's palsy   • Constipation, chronic   • Edema leg   • Leg cramps   • Fatigue   • Gastroesophageal reflux disease   • Hashimoto's thyroiditis   • Hyperlipidemia   • Hypertension   • Hypotension   • Hypothyroidism   • Leukopenia   • Memory loss   • Migraine headache   • Arthritis   • Osteoarthritis of knee   • Pyelonephritis, acute   • Other specified dorsopathies, site unspecified   • Medicare annual wellness visit, subsequent   • Acute non-recurrent maxillary sinusitis   • Pharyngitis   • Febrile illness, acute   • Flatulence/gas pain/belching   • Callus of foot       Advanced  Care Planning:  ACP discussion was held with the patient during this visit. Patient does not have an advance directive, information provided.    Review of Systems   Constitutional: Negative for chills, fatigue and fever.   HENT: Negative for congestion and nosebleeds.    Eyes: Negative for discharge and visual disturbance.   Respiratory: Negative for chest tightness and shortness of breath.    Cardiovascular: Negative for chest pain and palpitations.   Gastrointestinal: Negative for abdominal pain and blood in stool.        Belching and flatus   Endocrine: Negative for polydipsia and polyuria.   Genitourinary: Negative for dysuria and hematuria.   Musculoskeletal: Negative for back pain and gait problem.   Skin: Negative for rash and wound.   Neurological: Negative for dizziness and syncope.   Hematological: Negative for adenopathy. Does not bruise/bleed easily.   Psychiatric/Behavioral: Negative for confusion, dysphoric mood, self-injury and suicidal ideas.       Compared to one year ago, the patient feels her physical health is the same.  Compared to one year ago, the patient feels her mental health is the same.    Reviewed chart for potential of high risk medication in the elderly: yes  Reviewed chart for potential of harmful drug interactions in the elderly:yes    Objective         Vitals:    10/27/20 1039 10/28/20 0751   BP: 155/90 130/80   Pulse: 71    Resp: 10    Temp: 97.3 °F (36.3 °C)    Weight: 60.5 kg (133 lb 6.4 oz)        Body mass index is 20.89 kg/m².  Discussed the patient's BMI with her. The BMI is in the acceptable range.    Physical Exam  Vitals signs and nursing note reviewed.   Constitutional:       General: She is not in acute distress.     Appearance: Normal appearance. She is well-developed and normal weight.   HENT:      Head: Normocephalic and atraumatic.      Right Ear: Tympanic membrane and ear canal normal.      Left Ear: Tympanic membrane and ear canal normal.      Nose: Nose normal.       Mouth/Throat:      Mouth: Mucous membranes are moist.   Eyes:      General: Lids are normal.      Extraocular Movements: Extraocular movements intact.      Conjunctiva/sclera: Conjunctivae normal.      Pupils: Pupils are equal, round, and reactive to light.   Neck:      Musculoskeletal: Normal range of motion and neck supple.      Thyroid: No thyroid mass or thyromegaly.      Vascular: No carotid bruit or JVD.      Trachea: Trachea normal.      Comments: No carotid bruits  Cardiovascular:      Rate and Rhythm: Normal rate and regular rhythm.      Heart sounds: Normal heart sounds.   Pulmonary:      Effort: Pulmonary effort is normal.      Breath sounds: Normal breath sounds.   Abdominal:      General: Abdomen is flat. Bowel sounds are normal. There is no distension.      Palpations: Abdomen is soft. There is no mass.      Tenderness: There is no abdominal tenderness. There is no guarding.   Musculoskeletal:      Comments: No c/c/e  DP pulses are palpable.  She has a callus on her right MTP #5 that has a blister around it and bruising around it from recent treatment   Skin:     General: Skin is warm and dry.   Neurological:      General: No focal deficit present.      Mental Status: She is alert and oriented to person, place, and time.      Cranial Nerves: No cranial nerve deficit.   Psychiatric:         Attention and Perception: She is attentive.         Mood and Affect: Mood normal.         Speech: Speech normal.         Behavior: Behavior normal.         Thought Content: Thought content normal.         Judgment: Judgment normal.         Lab Results   Component Value Date    TRIG 66 10/21/2020    HDL 47 10/21/2020    LDL 91 10/21/2020    VLDL 13 10/21/2020        Assessment/Plan   Medicare Risks and Personalized Health Plan  CMS Preventative Services Quick Reference  Abdominal Aortic Aneurysm Screening  Advance Directive Discussion  Breast Cancer/Mammogram Screening  Cardiovascular risk  Chronic Pain   Colon  Cancer Screening  Dementia/Memory   Depression/Dysphoria  Diabetic Lab Screening   Fall Risk  Glaucoma Risk  Hearing Problem  Immunizations Discussed/Encouraged (specific immunizations; adacel Tdap, Influenza, Pneumococcal 23, Prevnar and Shingrix )  Inadequate Social Support, Isolation, Loneliness, Lack of Transportation, Financial Difficulties, or Caregiver Stress   Inactivity/Sedentary  Osteoprorosis Risk    The above risks/problems have been discussed with the patient.  Pertinent information has been shared with the patient in the After Visit Summary.  Follow up plans and orders are seen below in the Assessment/Plan Section.    Diagnoses and all orders for this visit:    1. Medicare annual wellness visit, subsequent (Primary)  Assessment & Plan:  Recommend continue routine aerobic exercise for cardiovascular health.  Weightbearing exercise is beneficial for bone health as well.  Nutritious diet high in fiber and vegetable, low carbohydrate, eating lean cuts of meat higher amounts fish.  Immunizations discussed with patient      2. Breast cancer screening by mammogram  -     Mammo Screening Digital Tomosynthesis Bilateral With CAD; Future    3. Anemia, unspecified type  -     Iron and TIBC  -     Vitamin B12  -     Ferritin    4. Flatulence/gas pain/belching  Assessment & Plan:  I think this is related to her Voltaren.  We will discontinue Voltaren.  We will try a probiotic.  Consider increasing omeprazole omeprazole and consider Flagyl in the future.  She will follow-up in 2 months.      5. Callus of foot  Assessment & Plan:  This looks to be a callus and not a plantar wart.  She needs to consider new shoes to take the pressure off that area.  I could normally trim the callus down however unfortunately because she was recently treated with liquid nitrogen and has swelling around the area and bruising I cannot treat it today.  If she would like to come back 1 day I can trim that down.      Other orders  -      levothyroxine (SYNTHROID, LEVOTHROID) 75 MCG tablet; Take 1 tablet by mouth Daily.  Dispense: 90 tablet; Refill: 3  -     liothyronine (Cytomel) 5 MCG tablet; 2 po q d  Dispense: 180 tablet; Refill: 3  -     Livalo 4 MG tablet; Take 1 tablet by mouth Daily.  Dispense: 90 tablet; Refill: 3  -     omeprazole (priLOSEC) 40 MG capsule; Take 1 capsule by mouth Daily.  Dispense: 90 capsule; Refill: 3  -     diclofenac (VOLTAREN) 75 MG EC tablet; Take 1 tablet by mouth 2 (Two) Times a Day.  Dispense: 180 tablet; Refill: 3    Follow Up:  Return in about 2 months (around 12/27/2020) for Recheck.     An After Visit Summary and PPPS were given to the patient.

## 2020-10-28 ENCOUNTER — LAB (OUTPATIENT)
Dept: FAMILY MEDICINE CLINIC | Facility: CLINIC | Age: 75
End: 2020-10-28

## 2020-10-28 VITALS
DIASTOLIC BLOOD PRESSURE: 80 MMHG | RESPIRATION RATE: 10 BRPM | TEMPERATURE: 97.3 F | SYSTOLIC BLOOD PRESSURE: 130 MMHG | HEART RATE: 71 BPM | WEIGHT: 133.4 LBS | BODY MASS INDEX: 20.89 KG/M2

## 2020-10-28 PROBLEM — L84 CALLUS OF FOOT: Status: ACTIVE | Noted: 2020-10-28

## 2020-10-28 PROBLEM — R14.0 FLATULENCE/GAS PAIN/BELCHING: Status: ACTIVE | Noted: 2020-10-28

## 2020-10-28 LAB
FERRITIN SERPL-MCNC: 55.7 NG/ML (ref 13–150)
IRON 24H UR-MRATE: 121 MCG/DL (ref 37–145)
IRON SATN MFR SERPL: 34 % (ref 20–50)
TIBC SERPL-MCNC: 358 MCG/DL (ref 298–536)
TRANSFERRIN SERPL-MCNC: 240 MG/DL (ref 200–360)
VIT B12 BLD-MCNC: 649 PG/ML (ref 211–946)

## 2020-10-28 PROCEDURE — 84466 ASSAY OF TRANSFERRIN: CPT | Performed by: FAMILY MEDICINE

## 2020-10-28 PROCEDURE — 82728 ASSAY OF FERRITIN: CPT | Performed by: FAMILY MEDICINE

## 2020-10-28 PROCEDURE — 82607 VITAMIN B-12: CPT | Performed by: FAMILY MEDICINE

## 2020-10-28 PROCEDURE — 83540 ASSAY OF IRON: CPT | Performed by: FAMILY MEDICINE

## 2020-10-28 NOTE — ASSESSMENT & PLAN NOTE
This looks to be a callus and not a plantar wart.  She needs to consider new shoes to take the pressure off that area.  I could normally trim the callus down however unfortunately because she was recently treated with liquid nitrogen and has swelling around the area and bruising I cannot treat it today.  If she would like to come back 1 day I can trim that down.

## 2020-10-28 NOTE — ASSESSMENT & PLAN NOTE
Recommend continue routine aerobic exercise for cardiovascular health.  Weightbearing exercise is beneficial for bone health as well.  Nutritious diet high in fiber and vegetable, low carbohydrate, eating lean cuts of meat higher amounts fish.  Immunizations discussed with patient

## 2020-10-28 NOTE — ASSESSMENT & PLAN NOTE
I think this is related to her Voltaren.  We will discontinue Voltaren.  We will try a probiotic.  Consider increasing omeprazole omeprazole and consider Flagyl in the future.  She will follow-up in 2 months.

## 2020-11-03 RX ORDER — IBUPROFEN 200 MG
CAPSULE ORAL
Qty: 60 TABLET | Refills: 10 | Status: SHIPPED | OUTPATIENT
Start: 2020-11-03

## 2020-11-03 RX ORDER — LIOTHYRONINE SODIUM 5 UG/1
TABLET ORAL
Qty: 180 TABLET | Refills: 2 | Status: SHIPPED | OUTPATIENT
Start: 2020-11-03

## 2020-11-19 RX ORDER — PITAVASTATIN CALCIUM 4.18 MG/1
TABLET, FILM COATED ORAL
Qty: 90 TABLET | Refills: 2 | Status: SHIPPED | OUTPATIENT
Start: 2020-11-19

## 2020-11-19 RX ORDER — DICLOFENAC SODIUM 75 MG/1
TABLET, DELAYED RELEASE ORAL
Qty: 180 TABLET | Refills: 2 | Status: SHIPPED | OUTPATIENT
Start: 2020-11-19

## 2020-11-19 RX ORDER — LEVOTHYROXINE SODIUM 0.07 MG/1
TABLET ORAL
Qty: 90 TABLET | Refills: 2 | Status: SHIPPED | OUTPATIENT
Start: 2020-11-19

## 2020-11-19 RX ORDER — OMEPRAZOLE 40 MG/1
CAPSULE, DELAYED RELEASE ORAL
Qty: 90 CAPSULE | Refills: 2 | Status: SHIPPED | OUTPATIENT
Start: 2020-11-19

## 2020-12-17 DIAGNOSIS — Z12.31 BREAST CANCER SCREENING BY MAMMOGRAM: ICD-10-CM

## 2020-12-28 ENCOUNTER — TELEPHONE (OUTPATIENT)
Dept: FAMILY MEDICINE CLINIC | Facility: CLINIC | Age: 75
End: 2020-12-28

## 2020-12-28 NOTE — TELEPHONE ENCOUNTER
I called patient and let her know that she could call [facility] and make her appointment. Her insurance does not require ref

## 2021-11-15 ENCOUNTER — TELEPHONE (OUTPATIENT)
Dept: CASE MANAGEMENT | Facility: OTHER | Age: 76
End: 2021-11-15

## 2021-11-15 NOTE — TELEPHONE ENCOUNTER
Called pt to schedule subsequent medicare wellness visit. No answer, left voicemail message containing RN-ACM contact information, [phone].

## 2022-12-30 ENCOUNTER — TRANSCRIBE ORDERS (OUTPATIENT)
Dept: ADMINISTRATIVE | Facility: HOSPITAL | Age: 77
End: 2022-12-30
Payer: MEDICARE

## 2022-12-30 ENCOUNTER — LAB (OUTPATIENT)
Dept: LAB | Facility: HOSPITAL | Age: 77
End: 2022-12-30
Payer: MEDICARE

## 2022-12-30 DIAGNOSIS — I80.02 SUPERFICIAL PHLEBITIS OF LEFT LEG: ICD-10-CM

## 2022-12-30 DIAGNOSIS — I80.02 SUPERFICIAL PHLEBITIS OF LEFT LEG: Primary | ICD-10-CM

## 2022-12-30 LAB — D DIMER PPP FEU-MCNC: 1.3 MG/L (FEU) (ref 0–0.78)

## 2022-12-30 PROCEDURE — 36415 COLL VENOUS BLD VENIPUNCTURE: CPT

## 2022-12-30 PROCEDURE — 85379 FIBRIN DEGRADATION QUANT: CPT

## 2023-04-13 ENCOUNTER — TELEPHONE (OUTPATIENT)
Dept: PEDIATRICS | Facility: OTHER | Age: 78
End: 2023-04-13
Payer: MEDICARE

## 2023-04-13 NOTE — PROGRESS NOTES
"The ABCs of the Annual Wellness Visit  Subsequent Medicare Wellness Visit    Subjective    Helen Rousseau is a 77 y.o. female who presents for a Subsequent Medicare Wellness Visit.    The following portions of the patient's history were reviewed and   updated as appropriate: {history reviewed:20406::\"allergies\",\"current medications\",\"past family history\",\"past medical history\",\"past social history\",\"past surgical history\",\"problem list\"}.    Compared to one year ago, the patient feels her physical   health is {better worse same:96628}.    Compared to one year ago, the patient feels her mental   health is {better worse same:12607}.    Recent Hospitalizations:  {Hospital Admission Status in the last 365 days:56938}      Current Medical Providers:  No care team member to display    Outpatient Medications Prior to Visit   Medication Sig Dispense Refill   • aspirin 81 MG tablet 81 mg.     • calcium (OS-ARMANDO) 600 MG tablet TAKE ONE TABLET BY MOUTH EVERY DAY 60 tablet 10   • Calcium Carbonate (CALTRATE 600) 1500 (600 Ca) MG tablet Take 1 tablet by mouth Daily. 60 tablet 11   • cetirizine (ZYRTEC ALLERGY) 10 MG tablet ZYRTEC ALLERGY 10 MG TABS     • Coenzyme Q10 300 MG capsule 300 mg.     • diclofenac (VOLTAREN) 75 MG EC tablet TAKE ONE TABLET BY MOUTH TWICE DAILY WITH FOOD 180 tablet 2   • levothyroxine (SYNTHROID, LEVOTHROID) 75 MCG tablet TAKE ONE TABLET BY MOUTH EVERY DAY 90 tablet 2   • liothyronine (CYTOMEL) 5 MCG tablet TAKE 2 TABLETS BY MOUTH EVERY  tablet 2   • Livalo 4 MG tablet TAKE 1 TABLET BY MOUTH EVERY DAY 90 tablet 2   • Multiple Vitamin (MULTIVITAMIN) capsule MULTIVITAMINS CAPS     • omeprazole (priLOSEC) 40 MG capsule TAKE 1 CAPSULE BY MOUTH EVERY DAY 90 capsule 2   • Probiotic Product (ALIGN) 4 MG capsule Take 4 mg by mouth Daily. 14 capsule 0   • promethazine (PHENERGAN) 25 MG suppository Insert 1 suppository into the rectum Every 6 (Six) Hours As Needed for Nausea or Vomiting. 12 suppository 0 " "    No facility-administered medications prior to visit.       No opioid medication identified on active medication list. I have reviewed chart for other potential  high risk medication/s and harmful drug interactions in the elderly.          Aspirin is on active medication list. {ASPIRIN ON MEDICATION LIST INDICATED/NOT INDICATED:26851}.      Patient Active Problem List   Diagnosis   • Arthritis of foot   • Atrial fibrillation   • Bell's palsy   • Constipation, chronic   • Edema leg   • Leg cramps   • Fatigue   • Gastroesophageal reflux disease   • Hashimoto's thyroiditis   • Hyperlipidemia   • Hypertension   • Hypotension   • Hypothyroidism   • Leukopenia   • Memory loss   • Migraine headache   • Arthritis   • Osteoarthritis of knee   • Pyelonephritis, acute   • Other specified dorsopathies, site unspecified   • Medicare annual wellness visit, subsequent   • Acute non-recurrent maxillary sinusitis   • Pharyngitis   • Febrile illness, acute   • Flatulence/gas pain/belching   • Callus of foot     Advance Care Planning   Advance Care Planning     Advance Directive is not on file.  {ACP Discussion, Advance Directive not in EMR:57862}     Objective    There were no vitals filed for this visit.  Estimated body mass index is 20.89 kg/m² as calculated from the following:    Height as of 10/16/18: 170.2 cm (67\").    Weight as of 10/27/20: 60.5 kg (133 lb 6.4 oz).    BMI cannot be calculated due to outdated height or weight values.  Please input a current height/weight in Vitals and re-renter BMIFOLLOWUP in Note to pull in correct documentation based on BMI range.      Does the patient have evidence of cognitive impairment? {Yes/No:02306}          HEALTH RISK ASSESSMENT    Smoking Status:  Social History     Tobacco Use   Smoking Status Never   Smokeless Tobacco Never     Alcohol Consumption:  Social History     Substance and Sexual Activity   Alcohol Use Yes     Fall Risk Screen:    ADARSH Fall Risk Assessment has not been " completed.    Depression Screening:       View : No data to display.                Health Habits and Functional and Cognitive Screening:      10/27/2020    10:40 AM   Functional & Cognitive Status   Do you have difficulty preparing food and eating? No   Do you have difficulty bathing yourself, getting dressed or grooming yourself? No   Do you have difficulty using the toilet? No   Do you have difficulty moving around from place to place? No   Do you have trouble with steps or getting out of a bed or a chair? No   Current Diet Well Balanced Diet   Dental Exam Up to date   Eye Exam Up to date   Exercise (times per week) 3 times per week   Current Exercise Activities Include Walking   Do you need help using the phone?  No   Are you deaf or do you have serious difficulty hearing?  No   Do you need help with transportation? No   Do you need help shopping? No   Do you need help preparing meals?  No   Do you need help with housework?  No   Do you need help with laundry? No   Do you need help taking your medications? No   Do you need help managing money? No   Do you ever drive or ride in a car without wearing a seat belt? No   Have you felt unusual stress, anger or loneliness in the last month? No   Who do you live with? Spouse   If you need help, do you have trouble finding someone available to you? No   Do you have difficulty concentrating, remembering or making decisions? Yes       Age-appropriate Screening Schedule:  Refer to the list below for future screening recommendations based on patient's age, sex and/or medical conditions. Orders for these recommended tests are listed in the plan section. The patient has been provided with a written plan.    Health Maintenance   Topic Date Due   • COVID-19 Vaccine (1) Never done   • TDAP/TD VACCINES (1 - Tdap) Never done   • ZOSTER VACCINE (2 of 3) 11/28/2014   • HEPATITIS C SCREENING  Never done   • LIPID PANEL  10/21/2021   • ANNUAL WELLNESS VISIT  10/27/2021   • DXA SCAN   12/16/2021   • INFLUENZA VACCINE  08/01/2023   • Pneumococcal Vaccine 65+  Completed                  CMS Preventative Services Quick Reference  Risk Factors Identified During Encounter  {Medicare Wellness Risk Factors:82387}  The above risks/problems have been discussed with the patient.  Pertinent information has been shared with the patient in the After Visit Summary.  An After Visit Summary and PPPS were made available to the patient.    Follow Up:   Next Medicare Wellness visit to be scheduled in 1 year.   {Wrapup  Review (Popup)  Advance Care Planning  Labs  CC  Problem List  Visit Diagnosis  Medications  Result Review  Imaging  Health Maintenance  Quality  BestPractice  SmartSets  SnapShot  Encounters  Notes  Media  Procedures :23}    Additional E&M Note during same encounter follows:  Patient has multiple medical problems which are significant and separately identifiable that require additional work above and beyond the Medicare Wellness Visit.      Chief Complaint  No chief complaint on file.    Subjective    {Problem List  Visit Diagnosis   Encounters  Notes  Medications  Labs  Result Review Imaging  Media :23}    SHANTHI Rousseau is also being seen today for ***         Objective   Vital Signs:  There were no vitals taken for this visit.    Physical Exam     {The following data was reviewed by (Optional):36380}  {Ambulatory Labs (Optional):02812}  {Data reviewed (Optional):69174:::1}           Assessment and Plan {CC Problem List  Visit Diagnosis   ROS  Review (Popup)  [facility]  [facility]  Medications  SmartSets  SnapShot Encounters  Media :23}  There are no diagnoses linked to this encounter.       {Time Spent (Optional):29232}  Follow Up {Instructions Charge Capture  Follow-up Communications :23}  No follow-ups on file.  Patient was given instructions and counseling regarding her condition or for health maintenance advice. Please see  specific information pulled into the AVS if appropriate.

## 2023-04-13 NOTE — TELEPHONE ENCOUNTER
"Caller: Helen Yo \"Val\"    Relationship to patient: Self    Best call back number: [phone]    Patient is needing:     PATIENT HAS A NEW PATIENT APPOINTMENT SCHEDULED FOR TOMORROW WITH DR. MCKENZIE     SHE SAID HER  IS SICK THOUGH AND SHE IS WANTING TO GIVE THE APPOINTMENT TO HIM INSTEAD    HER , EVELINE YO, HAS A NEW PATIENT APPOINTMENT ALSO, HIS WIFE WANTS TO SWITCH THE TWO AND GET HER  IN TOMORROW AND THEN HERSELF LATER     SHE SAID THAT LAST NIGHT HER  BEGAN TO FEEL SICK AND THREW UP BLACK VOMIT AND ALSO HAD AN UPSET STOMACH     REQUESTED CALLBACK  "

## 2023-04-14 ENCOUNTER — OFFICE VISIT (OUTPATIENT)
Dept: FAMILY MEDICINE CLINIC | Facility: CLINIC | Age: 78
End: 2023-04-14
Payer: MEDICARE

## 2023-04-14 ENCOUNTER — LAB (OUTPATIENT)
Dept: FAMILY MEDICINE CLINIC | Facility: CLINIC | Age: 78
End: 2023-04-14
Payer: MEDICARE

## 2023-04-14 ENCOUNTER — HOSPITAL ENCOUNTER (OUTPATIENT)
Dept: GENERAL RADIOLOGY | Facility: HOSPITAL | Age: 78
Discharge: HOME | End: 2023-04-14
Admitting: FAMILY MEDICINE
Payer: MEDICARE

## 2023-04-14 VITALS
HEART RATE: 76 BPM | OXYGEN SATURATION: 98 % | DIASTOLIC BLOOD PRESSURE: 76 MMHG | TEMPERATURE: 97.8 F | BODY MASS INDEX: 21.05 KG/M2 | WEIGHT: 131 LBS | HEIGHT: 66 IN | SYSTOLIC BLOOD PRESSURE: 132 MMHG

## 2023-04-14 DIAGNOSIS — E06.3 HYPOTHYROIDISM DUE TO HASHIMOTO'S THYROIDITIS: ICD-10-CM

## 2023-04-14 DIAGNOSIS — E78.00 PURE HYPERCHOLESTEROLEMIA: Primary | ICD-10-CM

## 2023-04-14 DIAGNOSIS — E03.8 HYPOTHYROIDISM DUE TO HASHIMOTO'S THYROIDITIS: ICD-10-CM

## 2023-04-14 DIAGNOSIS — M25.561 ACUTE PAIN OF RIGHT KNEE: ICD-10-CM

## 2023-04-14 DIAGNOSIS — E78.00 PURE HYPERCHOLESTEROLEMIA: ICD-10-CM

## 2023-04-14 PROBLEM — J01.00 ACUTE NON-RECURRENT MAXILLARY SINUSITIS: Status: RESOLVED | Noted: 2020-03-12 | Resolved: 2023-04-14

## 2023-04-14 LAB
ALBUMIN SERPL-MCNC: 4.7 G/DL (ref 3.5–5.2)
ALBUMIN/GLOB SERPL: 2.5 G/DL
ALP SERPL-CCNC: 51 U/L (ref 39–117)
ALT SERPL W P-5'-P-CCNC: 22 U/L (ref 1–33)
ANION GAP SERPL CALCULATED.3IONS-SCNC: 12 MMOL/L (ref 5–15)
AST SERPL-CCNC: 25 U/L (ref 1–32)
BILIRUB SERPL-MCNC: 0.5 MG/DL (ref 0–1.2)
BUN SERPL-MCNC: 11 MG/DL (ref 8–23)
BUN/CREAT SERPL: 14.3 (ref 7–25)
CALCIUM SPEC-SCNC: 9.8 MG/DL (ref 8.6–10.5)
CHLORIDE SERPL-SCNC: 103 MMOL/L (ref 98–107)
CHOLEST SERPL-MCNC: 184 MG/DL (ref 0–200)
CO2 SERPL-SCNC: 26 MMOL/L (ref 22–29)
CREAT SERPL-MCNC: 0.77 MG/DL (ref 0.57–1)
EGFRCR SERPLBLD CKD-EPI 2021: 79.1 ML/MIN/1.73
GLOBULIN UR ELPH-MCNC: 1.9 GM/DL
GLUCOSE SERPL-MCNC: 86 MG/DL (ref 65–99)
HDLC SERPL-MCNC: 50 MG/DL (ref 40–60)
LDLC SERPL CALC-MCNC: 121 MG/DL (ref 0–100)
LDLC/HDLC SERPL: 2.39 {RATIO}
POTASSIUM SERPL-SCNC: 4.1 MMOL/L (ref 3.5–5.2)
PROT SERPL-MCNC: 6.6 G/DL (ref 6–8.5)
SODIUM SERPL-SCNC: 141 MMOL/L (ref 136–145)
T3FREE SERPL-MCNC: 4.01 PG/ML (ref 2–4.4)
T4 FREE SERPL-MCNC: 1.49 NG/DL (ref 0.93–1.7)
TRIGL SERPL-MCNC: 72 MG/DL (ref 0–150)
TSH SERPL DL<=0.05 MIU/L-ACNC: 2.95 UIU/ML (ref 0.27–4.2)
VLDLC SERPL-MCNC: 13 MG/DL (ref 5–40)

## 2023-04-14 PROCEDURE — 73560 X-RAY EXAM OF KNEE 1 OR 2: CPT

## 2023-04-14 PROCEDURE — 36415 COLL VENOUS BLD VENIPUNCTURE: CPT

## 2023-04-14 PROCEDURE — 84443 ASSAY THYROID STIM HORMONE: CPT | Performed by: FAMILY MEDICINE

## 2023-04-14 PROCEDURE — 99214 OFFICE O/P EST MOD 30 MIN: CPT | Performed by: FAMILY MEDICINE

## 2023-04-14 PROCEDURE — 80061 LIPID PANEL: CPT | Performed by: FAMILY MEDICINE

## 2023-04-14 PROCEDURE — 84439 ASSAY OF FREE THYROXINE: CPT | Performed by: FAMILY MEDICINE

## 2023-04-14 PROCEDURE — 84481 FREE ASSAY (FT-3): CPT | Performed by: FAMILY MEDICINE

## 2023-04-14 PROCEDURE — 80053 COMPREHEN METABOLIC PANEL: CPT | Performed by: FAMILY MEDICINE

## 2023-04-14 RX ORDER — BISACODYL 5 MG/1
5 TABLET, DELAYED RELEASE ORAL DAILY PRN
COMMUNITY

## 2023-04-14 RX ORDER — LEVOTHYROXINE SODIUM 0.05 MG/1
1 TABLET ORAL DAILY
COMMUNITY
Start: 2023-03-02

## 2023-04-14 RX ORDER — PRAVASTATIN SODIUM 10 MG
5 TABLET ORAL NIGHTLY
COMMUNITY

## 2023-04-14 NOTE — PROGRESS NOTES
"Subjective   Helen Rousseau is a 77 y.o. female.     History of Present Illness     The patient presents today as a new patient to get established. She consents to the use of NAZ.    At one time the patient was in the hospital with a bad virus. After she was discharged, she took Excedrin for a headache. On her follow-up appointment, she was found to be in atrial fibrillation. It resolved and has not returned. Dr. Ramirez released her from his care. She still occasionally takes Excedrin.    The patient is on medication for her thyroid. Dr. Argueta changed her from 75 mcg to 50 mcg. She is on Cytomel 5 mcg. Dr. Soliman had her on 2 a day, but Dr. Argueta changed that to 1 a day. She would like to go back on the 2 a day.    She has had Bell's palsy in the past.    The patient has degenerative disc disease of her back and neck. She states she is \"okay with it.\" At one time she could hardly turn her head, but she can now. She states she has a rather good range of motion now.    The patient had a hysterectomy for menorrhagia.    The patient quit smoking a long time ago.     The patient fell recently, injuring her right knee. She tripped while walking on the sidewalk and her hands were full. It only hurts when she kneels on it.    She denies chest pain, or trouble breathing.    She is uncertain if she needs refills.    The following portions of the patient's history were reviewed and updated as appropriate: allergies, current medications, past family history, past medical history, past social history, past surgical history, and problem list.  Past Medical History:   Diagnosis Date   • Actinic keratosis    • Atrial fibrillation    • Basal cell adenocarcinoma    • Bell's palsy     Left   • Chronic constipation    • DDD (degenerative disc disease), cervical    • DDD (degenerative disc disease), cervical    • Edema    • GERD (gastroesophageal reflux disease) 2015   • Headache    • Hyperlipidemia    • Hypothyroidism    • " Migraine headache    • Mild carotid artery disease    • Osteoarthritis    • Statin-induced myositis      Past Surgical History:   Procedure Laterality Date   • BASAL CELL CARCINOMA EXCISION      nose   • BUNIONECTOMY Right    • COLONOSCOPY      2016- due 5 years   • HYSTERECTOMY       Family History   Problem Relation Age of Onset   • Thyroid disease Mother         100 years bold   • Diabetes Father         Diabetic   • Hyperlipidemia Father         High blood pressure   • Cancer Sister         Colon cancer   • Hyperlipidemia Sister    • Thyroid disease Sister    • Diabetes Brother         Diabetic   • Cancer Brother         Bladder cancer   • Cancer Brother         Multiple Myeloma   • Cancer Brother         Lung Cancer   • Diabetes Brother         Diabetic.   • Hyperlipidemia Brother    • Hyperlipidemia Brother    • Hyperlipidemia Brother    • Kidney disease Sister    • Thyroid disease Sister      Social History     Socioeconomic History   • Marital status:    Tobacco Use   • Smoking status: Former     Packs/day: 0.25     Types: Cigarettes     Start date:      Quit date:      Years since quittin.3     Passive exposure: Past   • Smokeless tobacco: Never   Vaping Use   • Vaping Use: Never used   Substance and Sexual Activity   • Alcohol use: Yes     Comment: Maybe a 2-4 ounce glass of wine 1 month.   • Drug use: Never   • Sexual activity: Yes     Partners: Male     Birth control/protection: Post-menopausal, Hysterectomy         Current Outpatient Medications:   •  aspirin 81 MG tablet, 1 tablet., Disp: , Rfl:   •  bisacodyl (Dulcolax) 5 MG EC tablet, Take 1 tablet by mouth Daily As Needed for Constipation., Disp: , Rfl:   •  calcium (OS-ARMANDO) 600 MG tablet, TAKE ONE TABLET BY MOUTH EVERY DAY, Disp: 60 tablet, Rfl: 10  •  Calcium Carbonate (CALTRATE 600) 1500 (600 Ca) MG tablet, Take 1 tablet by mouth Daily., Disp: 60 tablet, Rfl: 11  •  cetirizine (zyrTEC) 10 MG tablet, ZYRTEC ALLERGY 10 MG TABS,  "Disp: , Rfl:   •  Coenzyme Q10 300 MG capsule, 1 capsule., Disp: , Rfl:   •  diclofenac (VOLTAREN) 75 MG EC tablet, TAKE ONE TABLET BY MOUTH TWICE DAILY WITH FOOD, Disp: 180 tablet, Rfl: 2  •  Garlic 1000 MG capsule, Take 1 capsule by mouth Daily., Disp: , Rfl:   •  levothyroxine (SYNTHROID, LEVOTHROID) 50 MCG tablet, Take 1 tablet by mouth Daily., Disp: , Rfl:   •  liothyronine (CYTOMEL) 5 MCG tablet, TAKE 2 TABLETS BY MOUTH EVERY DAY (Patient taking differently: Take 1 tablet by mouth Daily.), Disp: 180 tablet, Rfl: 2  •  Multiple Vitamin (MULTIVITAMIN) capsule, MULTIVITAMINS CAPS, Disp: , Rfl:   •  omeprazole (priLOSEC) 40 MG capsule, TAKE 1 CAPSULE BY MOUTH EVERY DAY, Disp: 90 capsule, Rfl: 2  •  Polyethylene Glycol 3350 (MIRALAX PO), Take 1 each by mouth 4 (Four) Times a Week., Disp: , Rfl:   •  pravastatin (PRAVACHOL) 10 MG tablet, Take 5 mg by mouth Every Night., Disp: , Rfl:     Review of Systems   Review of systems was performed, and pertinent findings are noted in the HPI.    /76 (BP Location: Left arm, Patient Position: Sitting, Cuff Size: Adult)   Pulse 76   Temp 97.8 °F (36.6 °C) (Temporal)   Ht 166.4 cm (65.5\")   Wt 59.4 kg (131 lb)   SpO2 98%   BMI 21.47 kg/m²       Objective   Physical Exam  Vitals and nursing note reviewed.   Constitutional:       Appearance: Normal appearance. She is normal weight.   Cardiovascular:      Rate and Rhythm: Normal rate and regular rhythm.      Heart sounds: Normal heart sounds.   Pulmonary:      Effort: Pulmonary effort is normal.      Breath sounds: Normal breath sounds.   Musculoskeletal:      Cervical back: Neck supple.      Right knee: Effusion, bony tenderness and crepitus present. No erythema or ecchymosis. Normal range of motion. No LCL laxity or MCL laxity.      Instability Tests: Anterior drawer test negative. Posterior drawer test negative.      Right lower leg: No edema.      Left lower leg: No edema.   Neurological:      Mental Status: She is " alert.       No results were obtained or interpreted today.    Assessment & Plan   Problems Addressed this Visit        Cardiac and Vasculature    Pure hypercholesterolemia - Primary    Relevant Medications    pravastatin (PRAVACHOL) 10 MG tablet    Other Relevant Orders    Comprehensive Metabolic Panel    Lipid Panel       Endocrine and Metabolic    Hypothyroidism    Relevant Medications    levothyroxine (SYNTHROID, LEVOTHROID) 50 MCG tablet    Other Relevant Orders    TSH    T3, Free    T4, Free   Other Visit Diagnoses     Acute pain of right knee        Relevant Orders    XR Knee 1 or 2 View Right      Diagnoses       Codes Comments    Pure hypercholesterolemia    -  Primary ICD-10-CM: E78.00  ICD-9-CM: 272.0     Hypothyroidism due to Hashimoto's thyroiditis     ICD-10-CM: E03.8, E06.3  ICD-9-CM: 244.8, 245.2     Acute pain of right knee     ICD-10-CM: M25.561  ICD-9-CM: 719.46       1. Hypercholesterolemia  She is due a CMP and a lipid and she will continue her pravastatin 10 mg.    2. Hypothyroidism due to Hashimoto's thyroiditis  She is currently on levothyroxine 50 mcg and liothyronine 5 mcg. She is due a TSH, T3, and T4.    3. Acute pain of her right knee  With the tenderness and crepitance, I have ordered an x-ray for further evaluation.    I will see her back in the spring for follow-up and Medicare wellness.      32 minutes were spent reviewing her chart, taking history and doing exam, counseling the patient, ordering test and completing her chart          Transcribed from ambient dictation for Maile Osorio MD by Sophia Rodriguez.  04/14/23   11:13 EDT    Patient or patient representative verbalized consent to the visit recording.  I have personally performed the services described in this document as transcribed by the above individual, and it is both accurate and complete.

## 2023-05-23 RX ORDER — OMEPRAZOLE 40 MG/1
40 CAPSULE, DELAYED RELEASE ORAL DAILY
Qty: 90 CAPSULE | Refills: 1 | Status: SHIPPED | OUTPATIENT
Start: 2023-05-23

## 2023-05-23 RX ORDER — LIOTHYRONINE SODIUM 5 UG/1
TABLET ORAL
Qty: 180 TABLET | Refills: 1 | Status: SHIPPED | OUTPATIENT
Start: 2023-05-23

## 2023-05-23 RX ORDER — LEVOTHYROXINE SODIUM 0.05 MG/1
50 TABLET ORAL DAILY
Qty: 90 TABLET | Refills: 1 | Status: SHIPPED | OUTPATIENT
Start: 2023-05-23

## 2023-05-23 NOTE — TELEPHONE ENCOUNTER
"Caller: Helen Rousseau \"Val\"    Relationship: Self    Best call back number: [phone]    Requested Prescriptions:   Requested Prescriptions     Pending Prescriptions Disp Refills   • levothyroxine (SYNTHROID, LEVOTHROID) 50 MCG tablet       Sig: Take 1 tablet by mouth Daily.   • liothyronine (CYTOMEL) 5 MCG tablet 180 tablet 2     Sig: TAKE 2 TABLETS BY MOUTH EVERY DAY   • omeprazole (priLOSEC) 40 MG capsule 90 capsule 2     Sig: Take 1 capsule by mouth Daily.        Pharmacy where request should be sent: [facility] - [address] - [phone] [facility] [phone] FX     Last office visit with prescribing clinician: 4/14/2023   Last telemedicine visit with prescribing clinician: Visit date not found   Next office visit with prescribing clinician: 11/28/2023     Additional details provided by patient:  PATIENT ALSO STATED SHE NEEDED POTASIUM MEQ 10MG CAPSULES     Does the patient have less than a 3 day supply:  [x] Yes  [] No    Would you like a call back once the refill request has been completed: [x] Yes [] No    If the office needs to give you a call back, can they leave a voicemail: [x] Yes [] No    Krystle Duarte Rep   05/23/23 11:53 EDT       "

## 2023-08-16 ENCOUNTER — CLINICAL SUPPORT (OUTPATIENT)
Dept: FAMILY MEDICINE CLINIC | Facility: CLINIC | Age: 78
End: 2023-08-16
Payer: MEDICARE

## 2023-08-16 DIAGNOSIS — R82.81 PYURIA: Primary | ICD-10-CM

## 2023-08-16 LAB
BILIRUB BLD-MCNC: NEGATIVE MG/DL
CLARITY, POC: CLEAR
COLOR UR: YELLOW
EXPIRATION DATE: ABNORMAL
GLUCOSE UR STRIP-MCNC: NEGATIVE MG/DL
KETONES UR QL: NEGATIVE
LEUKOCYTE EST, POC: ABNORMAL
Lab: ABNORMAL
NITRITE UR-MCNC: POSITIVE MG/ML
PH UR: 6 [PH] (ref 5–8)
PROT UR STRIP-MCNC: NEGATIVE MG/DL
RBC # UR STRIP: ABNORMAL /UL
SP GR UR: 1.01 (ref 1–1.03)
UROBILINOGEN UR QL: ABNORMAL

## 2023-08-16 PROCEDURE — 87086 URINE CULTURE/COLONY COUNT: CPT | Performed by: FAMILY MEDICINE

## 2023-08-16 PROCEDURE — 87077 CULTURE AEROBIC IDENTIFY: CPT | Performed by: FAMILY MEDICINE

## 2023-08-16 PROCEDURE — 87186 SC STD MICRODIL/AGAR DIL: CPT | Performed by: FAMILY MEDICINE

## 2023-08-16 RX ORDER — CEFDINIR 300 MG/1
300 CAPSULE ORAL 2 TIMES DAILY
Qty: 14 CAPSULE | Refills: 0 | Status: SHIPPED | OUTPATIENT
Start: 2023-08-16 | End: 2023-08-18 | Stop reason: ALTCHOICE

## 2023-08-18 LAB — BACTERIA SPEC AEROBE CULT: ABNORMAL

## 2023-08-18 RX ORDER — NITROFURANTOIN 25; 75 MG/1; MG/1
100 CAPSULE ORAL 2 TIMES DAILY
Qty: 20 CAPSULE | Refills: 0 | Status: SHIPPED | OUTPATIENT
Start: 2023-08-18

## 2023-08-18 NOTE — PROGRESS NOTES
Left message to return call to doctor's office at [facility]. Either to get test results or message from provider.

## 2023-09-21 ENCOUNTER — OFFICE VISIT (OUTPATIENT)
Dept: FAMILY MEDICINE CLINIC | Facility: CLINIC | Age: 78
End: 2023-09-21
Payer: MEDICARE

## 2023-09-21 VITALS
BODY MASS INDEX: 20.41 KG/M2 | WEIGHT: 127 LBS | HEIGHT: 66 IN | DIASTOLIC BLOOD PRESSURE: 78 MMHG | SYSTOLIC BLOOD PRESSURE: 130 MMHG | OXYGEN SATURATION: 99 % | TEMPERATURE: 97.8 F | HEART RATE: 71 BPM

## 2023-09-21 DIAGNOSIS — L98.9 FACIAL SKIN LESION: Primary | ICD-10-CM

## 2023-09-21 DIAGNOSIS — R82.90 CLOUDY URINE: ICD-10-CM

## 2023-09-21 DIAGNOSIS — R82.81 PYURIA: ICD-10-CM

## 2023-09-21 PROBLEM — Z83.719 FAMILY HISTORY OF COLONIC POLYPS: Status: ACTIVE | Noted: 2023-09-21

## 2023-09-21 PROBLEM — G43.909 MIGRAINE: Status: ACTIVE | Noted: 2023-09-21

## 2023-09-21 PROBLEM — J18.9 PNEUMONIA: Status: ACTIVE | Noted: 2023-09-21

## 2023-09-21 PROBLEM — Z83.71 FAMILY HISTORY OF COLONIC POLYPS: Status: ACTIVE | Noted: 2023-09-21

## 2023-09-21 PROBLEM — R13.10 DYSPHAGIA: Status: ACTIVE | Noted: 2023-09-21

## 2023-09-21 PROBLEM — E78.5 HYPERLIPIDEMIA: Status: ACTIVE | Noted: 2023-09-21

## 2023-09-21 LAB
BILIRUB BLD-MCNC: ABNORMAL MG/DL
CLARITY, POC: ABNORMAL
COLOR UR: YELLOW
EXPIRATION DATE: ABNORMAL
GLUCOSE UR STRIP-MCNC: NEGATIVE MG/DL
KETONES UR QL: NEGATIVE
LEUKOCYTE EST, POC: ABNORMAL
Lab: ABNORMAL
NITRITE UR-MCNC: POSITIVE MG/ML
PH UR: 6.5 [PH] (ref 5–8)
PROT UR STRIP-MCNC: ABNORMAL MG/DL
RBC # UR STRIP: ABNORMAL /UL
SP GR UR: 1.01 (ref 1–1.03)
UROBILINOGEN UR QL: ABNORMAL

## 2023-09-21 PROCEDURE — 87186 SC STD MICRODIL/AGAR DIL: CPT | Performed by: FAMILY MEDICINE

## 2023-09-21 PROCEDURE — 81003 URINALYSIS AUTO W/O SCOPE: CPT | Performed by: FAMILY MEDICINE

## 2023-09-21 PROCEDURE — 87077 CULTURE AEROBIC IDENTIFY: CPT | Performed by: FAMILY MEDICINE

## 2023-09-21 PROCEDURE — 87086 URINE CULTURE/COLONY COUNT: CPT | Performed by: FAMILY MEDICINE

## 2023-09-21 PROCEDURE — 99213 OFFICE O/P EST LOW 20 MIN: CPT | Performed by: FAMILY MEDICINE

## 2023-09-21 RX ORDER — CEFDINIR 300 MG/1
300 CAPSULE ORAL 2 TIMES DAILY
Qty: 14 CAPSULE | Refills: 0 | Status: SHIPPED | OUTPATIENT
Start: 2023-09-21 | End: 2023-09-28

## 2023-09-21 RX ORDER — POTASSIUM CHLORIDE 750 MG/1
TABLET, FILM COATED, EXTENDED RELEASE ORAL
COMMUNITY
Start: 2023-08-28

## 2023-09-21 RX ORDER — MV-MN/C/THEANINE/HERB NO.310 1000-200MG
POWDER IN PACKET (EA) ORAL
COMMUNITY

## 2023-09-21 NOTE — PROGRESS NOTES
"Subjective   Helen Rousseau is a 78 y.o. female.     History of Present Illness     Ms. Helen Rousseau is a 78-year-old female who presents today with concerns about some skin issues. She is wanting a referral to dermatology.    She consents to the use of NAZ.    The patient has a lesion below her right eye that was removed in 2022. She called yesterday, 09/20/2023, to get an appointment with Dr. Zapata, the dermatologist who removed the lesion before, but was told she needed a referral.     In the past, he had given her Vivant skin care products for her arms. The are getting \"bumpy\" again.     She also uses Rison antifungal on her toes.    The patient is requesting a urinalysis today. She believes she may have a bladder infection again. Her urine is cloudy.    The following portions of the patient's history were reviewed and updated as appropriate: allergies, current medications, past family history, past medical history, past social history, past surgical history, and problem list.  Past Medical History:   Diagnosis Date    Actinic keratosis     Atrial fibrillation     Basal cell adenocarcinoma     Bell's palsy     Left    Chronic constipation     DDD (degenerative disc disease), cervical     DDD (degenerative disc disease), cervical     Edema     GERD (gastroesophageal reflux disease) 2015    Headache     Hyperlipidemia     Hypothyroidism     Migraine headache     Mild carotid artery disease     Osteoarthritis     Statin-induced myositis      Past Surgical History:   Procedure Laterality Date    BASAL CELL CARCINOMA EXCISION      nose    BUNIONECTOMY Right     COLONOSCOPY      2016- due 5 years    HYSTERECTOMY       Family History   Problem Relation Age of Onset    Thyroid disease Mother         100 years bold    Diabetes Father         Diabetic    Hyperlipidemia Father         High blood pressure    Cancer Sister         Colon cancer    Hyperlipidemia Sister     Thyroid disease Sister     Diabetes Brother        "  Diabetic    Cancer Brother         Bladder cancer    Cancer Brother         Multiple Myeloma    Cancer Brother         Lung Cancer    Diabetes Brother         Diabetic.    Hyperlipidemia Brother     Hyperlipidemia Brother     Hyperlipidemia Brother     Kidney disease Sister     Thyroid disease Sister      Social History     Socioeconomic History    Marital status:    Tobacco Use    Smoking status: Former     Packs/day: 0.25     Types: Cigarettes     Start date:      Quit date:      Years since quittin.7     Passive exposure: Past    Smokeless tobacco: Never   Vaping Use    Vaping Use: Never used   Substance and Sexual Activity    Alcohol use: Yes     Comment: Maybe a 2-4 ounce glass of wine 1 month.    Drug use: Never    Sexual activity: Yes     Partners: Male     Birth control/protection: Post-menopausal, Hysterectomy         Current Outpatient Medications:     aspirin 81 MG tablet, 1 tablet., Disp: , Rfl:     bisacodyl (DULCOLAX) 5 MG EC tablet, Take 1 tablet by mouth Daily As Needed for Constipation., Disp: , Rfl:     calcium (OS-ARMANDO) 600 MG tablet, TAKE ONE TABLET BY MOUTH EVERY DAY, Disp: 60 tablet, Rfl: 10    Calcium Carbonate (CALTRATE 600) 1500 (600 Ca) MG tablet, Take 1 tablet by mouth Daily., Disp: 60 tablet, Rfl: 11    cetirizine (zyrTEC) 10 MG tablet, ZYRTEC ALLERGY 10 MG TABS, Disp: , Rfl:     Coenzyme Q10 300 MG capsule, 1 capsule., Disp: , Rfl:     diclofenac (VOLTAREN) 75 MG EC tablet, TAKE ONE TABLET BY MOUTH TWICE DAILY WITH FOOD, Disp: 180 tablet, Rfl: 2    Garlic 1000 MG capsule, Take 1 capsule by mouth Daily., Disp: , Rfl:     levothyroxine (SYNTHROID, LEVOTHROID) 50 MCG tablet, Take 1 tablet by mouth Daily., Disp: 90 tablet, Rfl: 1    liothyronine (CYTOMEL) 5 MCG tablet, TAKE 2 TABLETS BY MOUTH EVERY DAY, Disp: 180 tablet, Rfl: 1    Misc Natural Products (Neuriva) capsule, Take  by mouth., Disp: , Rfl:     Multiple Vitamin (MULTIVITAMIN) capsule, MULTIVITAMINS CAPS, Disp: ,  "Rfl:     nitrofurantoin, macrocrystal-monohydrate, (Macrobid) 100 MG capsule, Take 1 capsule by mouth 2 (Two) Times a Day., Disp: 20 capsule, Rfl: 0    omeprazole (priLOSEC) 40 MG capsule, Take 1 capsule by mouth Daily., Disp: 90 capsule, Rfl: 1    Polyethylene Glycol 3350 (MIRALAX PO), Take 1 each by mouth 4 (Four) Times a Week., Disp: , Rfl:     potassium chloride 10 MEQ CR tablet, , Disp: , Rfl:     pravastatin (PRAVACHOL) 10 MG tablet, Take 0.5 tablets by mouth Every Night., Disp: , Rfl:     cefdinir (OMNICEF) 300 MG capsule, Take 1 capsule by mouth 2 (Two) Times a Day for 7 days., Disp: 14 capsule, Rfl: 0    Review of Systems  Review of systems was performed, and pertinent findings are noted in the HPI.    /78 (BP Location: Left arm, Patient Position: Sitting, Cuff Size: Adult)   Pulse 71   Temp 97.8 °F (36.6 °C) (Temporal)   Ht 166.4 cm (65.5\")   Wt 57.6 kg (127 lb)   SpO2 99%   BMI 20.81 kg/m²       Objective   Physical Exam  Vitals and nursing note reviewed.   Constitutional:       Appearance: Normal appearance. She is normal weight.   Cardiovascular:      Rate and Rhythm: Normal rate and regular rhythm.      Heart sounds: Normal heart sounds.   Pulmonary:      Effort: Pulmonary effort is normal.      Breath sounds: Normal breath sounds.   Skin:         Neurological:      Mental Status: She is alert.     We discussed that the patient's TSH and lipid panel laboratory results earlier this year were within normal limits.  Brief Urine Lab Results  (Last result in the past 365 days)        Color   Clarity   Blood   Leuk Est   Nitrite   Protein   CREAT   Urine HCG        09/21/23 0921 Yellow   Cloudy   Moderate   Large (3+)   Positive   Trace                     Assessment & Plan   Problems Addressed this Visit    None  Visit Diagnoses       Facial skin lesion    -  Primary    Relevant Orders    Ambulatory Referral to Dermatology (Completed)    Cloudy urine        Relevant Orders    POCT urinalysis " dipstick, automated (Completed)    Pyuria        Relevant Orders    Urine Culture - Urine, Urine, Clean Catch          Diagnoses         Codes Comments    Facial skin lesion    -  Primary ICD-10-CM: L98.9  ICD-9-CM: 709.9     Cloudy urine     ICD-10-CM: R82.90  ICD-9-CM: 791.9     Pyuria     ICD-10-CM: R82.81  ICD-9-CM: 791.9           1. Facial skin lesion  - Referral to dermatology.    2. Pyuria  - Urinalysis was abnormal.  - I will send her urine for culture and I started her on Omnicef and encouraged her to push fluids.    3. Health maintenance  - Will recheck thyroid panel in 04/2024.  - Will recheck lipid panel in 04/2024.    Follow up in 04/2024.    Transcribed from ambient dictation for Maile Osorio MD by Sophia Rodriguez.  09/21/23   09:03 EDT    Patient or patient representative verbalized consent to the visit recording.  I have personally performed the services described in this document as transcribed by the above individual, and it is both accurate and complete.

## 2023-09-23 LAB — BACTERIA SPEC AEROBE CULT: ABNORMAL

## 2023-09-25 RX ORDER — DOXYCYCLINE HYCLATE 100 MG/1
100 CAPSULE ORAL 2 TIMES DAILY
Qty: 20 CAPSULE | Refills: 0 | Status: SHIPPED | OUTPATIENT
Start: 2023-09-25 | End: 2023-10-05

## 2023-09-26 ENCOUNTER — ON CAMPUS - OUTPATIENT (AMBULATORY)
Dept: URBAN - METROPOLITAN AREA HOSPITAL 2 | Facility: HOSPITAL | Age: 78
End: 2023-09-26
Payer: COMMERCIAL

## 2023-09-26 VITALS
TEMPERATURE: 97.1 F | HEART RATE: 66 BPM | DIASTOLIC BLOOD PRESSURE: 62 MMHG | DIASTOLIC BLOOD PRESSURE: 75 MMHG | SYSTOLIC BLOOD PRESSURE: 146 MMHG | OXYGEN SATURATION: 99 % | HEIGHT: 66 IN | HEART RATE: 58 BPM | OXYGEN SATURATION: 100 % | HEART RATE: 65 BPM | HEART RATE: 654 BPM | DIASTOLIC BLOOD PRESSURE: 78 MMHG | DIASTOLIC BLOOD PRESSURE: 81 MMHG | HEART RATE: 69 BPM | SYSTOLIC BLOOD PRESSURE: 121 MMHG | HEART RATE: 57 BPM | SYSTOLIC BLOOD PRESSURE: 114 MMHG | WEIGHT: 127 LBS | RESPIRATION RATE: 18 BRPM | SYSTOLIC BLOOD PRESSURE: 109 MMHG | DIASTOLIC BLOOD PRESSURE: 63 MMHG | DIASTOLIC BLOOD PRESSURE: 60 MMHG | SYSTOLIC BLOOD PRESSURE: 151 MMHG | HEART RATE: 68 BPM | SYSTOLIC BLOOD PRESSURE: 135 MMHG | RESPIRATION RATE: 17 BRPM | RESPIRATION RATE: 12 BRPM | SYSTOLIC BLOOD PRESSURE: 129 MMHG | DIASTOLIC BLOOD PRESSURE: 70 MMHG | RESPIRATION RATE: 16 BRPM

## 2023-09-26 DIAGNOSIS — R13.10 DYSPHAGIA, UNSPECIFIED: ICD-10-CM

## 2023-09-26 DIAGNOSIS — K64.1 SECOND DEGREE HEMORRHOIDS: ICD-10-CM

## 2023-09-26 DIAGNOSIS — K57.30 DIVERTICULOSIS OF LARGE INTESTINE WITHOUT PERFORATION OR ABS: ICD-10-CM

## 2023-09-26 DIAGNOSIS — Z80.0 FAMILY HISTORY OF MALIGNANT NEOPLASM OF DIGESTIVE ORGANS: ICD-10-CM

## 2023-09-26 DIAGNOSIS — K22.2 ESOPHAGEAL OBSTRUCTION: ICD-10-CM

## 2023-09-26 DIAGNOSIS — Z12.11 ENCOUNTER FOR SCREENING FOR MALIGNANT NEOPLASM OF COLON: ICD-10-CM

## 2023-09-26 PROCEDURE — G0105 COLORECTAL SCRN; HI RISK IND: HCPCS | Performed by: INTERNAL MEDICINE

## 2023-09-26 PROCEDURE — 43450 DILATE ESOPHAGUS 1/MULT PASS: CPT | Performed by: INTERNAL MEDICINE

## 2023-09-26 PROCEDURE — 43235 EGD DIAGNOSTIC BRUSH WASH: CPT | Performed by: INTERNAL MEDICINE

## 2023-11-06 RX ORDER — LEVOTHYROXINE SODIUM 50 MCG
50 TABLET ORAL DAILY
Qty: 90 TABLET | Refills: 1 | Status: SHIPPED | OUTPATIENT
Start: 2023-11-06

## 2023-11-06 RX ORDER — LIOTHYRONINE SODIUM 5 UG/1
TABLET ORAL
Qty: 180 TABLET | Refills: 1 | Status: SHIPPED | OUTPATIENT
Start: 2023-11-06

## 2023-11-20 RX ORDER — OMEPRAZOLE 40 MG/1
40 CAPSULE, DELAYED RELEASE ORAL DAILY
Qty: 90 CAPSULE | Refills: 1 | Status: SHIPPED | OUTPATIENT
Start: 2023-11-20

## 2023-11-28 ENCOUNTER — OFFICE VISIT (OUTPATIENT)
Dept: FAMILY MEDICINE CLINIC | Facility: CLINIC | Age: 78
End: 2023-11-28
Payer: MEDICARE

## 2023-11-28 ENCOUNTER — TELEPHONE (OUTPATIENT)
Dept: FAMILY MEDICINE CLINIC | Facility: CLINIC | Age: 78
End: 2023-11-28

## 2023-11-28 ENCOUNTER — LAB (OUTPATIENT)
Dept: FAMILY MEDICINE CLINIC | Facility: CLINIC | Age: 78
End: 2023-11-28
Payer: MEDICARE

## 2023-11-28 VITALS
BODY MASS INDEX: 20.41 KG/M2 | DIASTOLIC BLOOD PRESSURE: 79 MMHG | SYSTOLIC BLOOD PRESSURE: 144 MMHG | HEART RATE: 67 BPM | OXYGEN SATURATION: 99 % | HEIGHT: 66 IN | TEMPERATURE: 97.7 F | WEIGHT: 127 LBS

## 2023-11-28 DIAGNOSIS — Z00.00 MEDICARE ANNUAL WELLNESS VISIT, SUBSEQUENT: Primary | ICD-10-CM

## 2023-11-28 DIAGNOSIS — E78.00 PURE HYPERCHOLESTEROLEMIA: ICD-10-CM

## 2023-11-28 DIAGNOSIS — E03.8 HYPOTHYROIDISM DUE TO HASHIMOTO'S THYROIDITIS: ICD-10-CM

## 2023-11-28 DIAGNOSIS — R82.90 CLOUDY URINE: ICD-10-CM

## 2023-11-28 DIAGNOSIS — E06.3 HYPOTHYROIDISM DUE TO HASHIMOTO'S THYROIDITIS: ICD-10-CM

## 2023-11-28 DIAGNOSIS — Z00.00 MEDICARE ANNUAL WELLNESS VISIT, SUBSEQUENT: ICD-10-CM

## 2023-11-28 DIAGNOSIS — R82.90 FOUL SMELLING URINE: ICD-10-CM

## 2023-11-28 LAB
ALBUMIN SERPL-MCNC: 4.6 G/DL (ref 3.5–5.2)
ALBUMIN/GLOB SERPL: 2.4 G/DL
ALP SERPL-CCNC: 63 U/L (ref 39–117)
ALT SERPL W P-5'-P-CCNC: 37 U/L (ref 1–33)
ANION GAP SERPL CALCULATED.3IONS-SCNC: 7.1 MMOL/L (ref 5–15)
AST SERPL-CCNC: 34 U/L (ref 1–32)
BILIRUB SERPL-MCNC: 0.4 MG/DL (ref 0–1.2)
BUN SERPL-MCNC: 16 MG/DL (ref 8–23)
BUN/CREAT SERPL: 23.2 (ref 7–25)
CALCIUM SPEC-SCNC: 9.4 MG/DL (ref 8.6–10.5)
CHLORIDE SERPL-SCNC: 105 MMOL/L (ref 98–107)
CHOLEST SERPL-MCNC: 170 MG/DL (ref 0–200)
CO2 SERPL-SCNC: 29.9 MMOL/L (ref 22–29)
CREAT SERPL-MCNC: 0.69 MG/DL (ref 0.57–1)
EGFRCR SERPLBLD CKD-EPI 2021: 88.4 ML/MIN/1.73
GLOBULIN UR ELPH-MCNC: 1.9 GM/DL
GLUCOSE SERPL-MCNC: 87 MG/DL (ref 65–99)
HDLC SERPL-MCNC: 51 MG/DL (ref 40–60)
LDLC SERPL CALC-MCNC: 105 MG/DL (ref 0–100)
LDLC/HDLC SERPL: 2.05 {RATIO}
POTASSIUM SERPL-SCNC: 4.2 MMOL/L (ref 3.5–5.2)
PROT SERPL-MCNC: 6.5 G/DL (ref 6–8.5)
SODIUM SERPL-SCNC: 142 MMOL/L (ref 136–145)
TRIGL SERPL-MCNC: 71 MG/DL (ref 0–150)
TSH SERPL DL<=0.05 MIU/L-ACNC: 3.94 UIU/ML (ref 0.27–4.2)
VLDLC SERPL-MCNC: 14 MG/DL (ref 5–40)

## 2023-11-28 PROCEDURE — 87086 URINE CULTURE/COLONY COUNT: CPT | Performed by: FAMILY MEDICINE

## 2023-11-28 PROCEDURE — 84443 ASSAY THYROID STIM HORMONE: CPT | Performed by: FAMILY MEDICINE

## 2023-11-28 PROCEDURE — 87186 SC STD MICRODIL/AGAR DIL: CPT

## 2023-11-28 PROCEDURE — 80061 LIPID PANEL: CPT | Performed by: FAMILY MEDICINE

## 2023-11-28 PROCEDURE — 36415 COLL VENOUS BLD VENIPUNCTURE: CPT

## 2023-11-28 PROCEDURE — 87077 CULTURE AEROBIC IDENTIFY: CPT | Performed by: FAMILY MEDICINE

## 2023-11-28 PROCEDURE — 81001 URINALYSIS AUTO W/SCOPE: CPT | Performed by: FAMILY MEDICINE

## 2023-11-28 PROCEDURE — 80053 COMPREHEN METABOLIC PANEL: CPT | Performed by: FAMILY MEDICINE

## 2023-11-28 NOTE — PROGRESS NOTES
The ABCs of the Annual Wellness Visit  Subsequent Medicare Wellness Visit    Subjective    Helen Rousseau is a 78 y.o. female who presents for a Subsequent Medicare Wellness Visit.    The following portions of the patient's history were reviewed and   updated as appropriate: allergies, current medications, past family history, past medical history, past social history, past surgical history, and problem list.    Compared to one year ago, the patient feels her physical   health is the same.    Compared to one year ago, the patient feels her mental   health is worse.    Recent Hospitalizations:  She was not admitted to the hospital during the last year.       Current Medical Providers:  Patient Care Team:  Maile Osorio MD as PCP - General (Family Medicine)    Outpatient Medications Prior to Visit   Medication Sig Dispense Refill    aspirin 81 MG tablet 1 tablet.      bisacodyl (DULCOLAX) 5 MG EC tablet Take 1 tablet by mouth Daily As Needed for Constipation.      calcium (OS-ARMANDO) 600 MG tablet TAKE ONE TABLET BY MOUTH EVERY DAY 60 tablet 10    cetirizine (zyrTEC) 10 MG tablet ZYRTEC ALLERGY 10 MG TABS      Garlic 1000 MG capsule Take 1 capsule by mouth Daily.      liothyronine (CYTOMEL) 5 MCG tablet TAKE 2 TABLETS DAILY 180 tablet 1    Misc Natural Products (Neuriva) capsule Take  by mouth.      Multiple Vitamin (MULTIVITAMIN) capsule MULTIVITAMINS CAPS      omeprazole (priLOSEC) 40 MG capsule TAKE 1 CAPSULE DAILY 90 capsule 1    Polyethylene Glycol 3350 (MIRALAX PO) Take 1 each by mouth 4 (Four) Times a Week.      Synthroid 50 MCG tablet TAKE 1 TABLET DAILY 90 tablet 1    Coenzyme Q10 300 MG capsule 1 capsule.      diclofenac (VOLTAREN) 75 MG EC tablet TAKE ONE TABLET BY MOUTH TWICE DAILY WITH FOOD 180 tablet 2    potassium chloride 10 MEQ CR tablet       pravastatin (PRAVACHOL) 10 MG tablet Take 0.5 tablets by mouth Every Night.      Calcium Carbonate (CALTRATE 600) 1500 (600 Ca) MG tablet Take 1  tablet by mouth Daily. (Patient not taking: Reported on 11/28/2023) 60 tablet 11    nitrofurantoin, macrocrystal-monohydrate, (Macrobid) 100 MG capsule Take 1 capsule by mouth 2 (Two) Times a Day. (Patient not taking: Reported on 11/28/2023) 20 capsule 0     No facility-administered medications prior to visit.       No opioid medication identified on active medication list. I have reviewed chart for other potential  high risk medication/s and harmful drug interactions in the elderly.        Aspirin is on active medication list. Aspirin use is indicated based on review of current medical condition/s. Pros and cons of this therapy have been discussed today. Benefits of this medication outweigh potential harm.  Patient has been encouraged to continue taking this medication.  .      Patient Active Problem List   Diagnosis    Arthritis of foot    Atrial fibrillation    Bell's palsy    Constipation, chronic    Edema leg    Leg cramps    Fatigue    Gastroesophageal reflux disease    Hashimoto's thyroiditis    Pure hypercholesterolemia    Hypothyroidism    Leukopenia    Memory loss    Migraine headache    Arthritis    Osteoarthritis of knee    Pyelonephritis, acute    Other specified dorsopathies, site unspecified    Medicare annual wellness visit, subsequent    Pharyngitis    Febrile illness, acute    Flatulence/gas pain/belching    Callus of foot    Hyperlipidemia    Migraine    Dysphagia    Family history of colon cancer    Family history of colonic polyps    Internal hemorrhoids without complication    Second degree hemorrhoids    Pneumonia    Allergies    Nausea     Advance Care Planning   Advance Care Planning     Advance Directive is not on file.  ACP discussion was held with the patient during this visit. Patient does not have an advance directive, information provided.     Objective    Vitals:    11/28/23 1350   BP: 144/79   BP Location: Left arm   Patient Position: Sitting   Cuff Size: Adult   Pulse: 67   Temp:  "97.7 °F (36.5 °C)   TempSrc: Temporal   SpO2: 99%   Weight: 57.6 kg (127 lb)   Height: 166.4 cm (65.5\")   PainSc: 0-No pain     Estimated body mass index is 20.81 kg/m² as calculated from the following:    Height as of this encounter: 166.4 cm (65.5\").    Weight as of this encounter: 57.6 kg (127 lb).    BMI is within normal parameters. No other follow-up for BMI required.      Does the patient have evidence of cognitive impairment? No  MMSE done    Lab Results   Component Value Date    TRIG 71 2023    HDL 51 2023     (H) 2023    VLDL 14 2023        HEALTH RISK ASSESSMENT    Smoking Status:  Social History     Tobacco Use   Smoking Status Former    Packs/day: 0.25    Years: 4.00    Additional pack years: 0.00    Total pack years: 1.00    Types: Cigarettes    Start date: 1963    Quit date: 1967    Years since quittin.9    Passive exposure: Past   Smokeless Tobacco Never   Tobacco Comments    2 to 4 cigarettes a day.     Alcohol Consumption:  Social History     Substance and Sexual Activity   Alcohol Use Yes    Comment: Maybe a 2-4 ounce glass of wine 1 month.     Fall Risk Screen:    STEADI Fall Risk Assessment was completed, and patient is at LOW risk for falls.Assessment completed on:2023    Depression Screenin/28/2023     2:10 PM   PHQ-2/PHQ-9 Depression Screening   Little Interest or Pleasure in Doing Things 0-->not at all   Feeling Down, Depressed or Hopeless 1-->several days   PHQ-9: Brief Depression Severity Measure Score 1       Health Habits and Functional and Cognitive Screenin/28/2023     2:08 PM   Functional & Cognitive Status   Do you have difficulty preparing food and eating? No   Do you have difficulty bathing yourself, getting dressed or grooming yourself? No   Do you have difficulty using the toilet? No   Do you have difficulty moving around from place to place? No   Do you have trouble with steps or getting out of a bed or a " chair? No   Current Diet Well Balanced Diet   Dental Exam Up to date   Eye Exam Up to date   Exercise (times per week) 7 times per week   Current Exercises Include Yard Work;House Cleaning   Do you need help using the phone?  No   Are you deaf or do you have serious difficulty hearing?  No   Do you need help to go to places out of walking distance? No   Do you need help shopping? No   Do you need help preparing meals?  No   Do you need help with housework?  No   Do you need help with laundry? No   Do you need help taking your medications? No   Do you need help managing money? No   Do you ever drive or ride in a car without wearing a seat belt? No   Have you felt unusual stress, anger or loneliness in the last month? No   Who do you live with? Spouse   If you need help, do you have trouble finding someone available to you? No   Have you been bothered in the last four weeks by sexual problems? No   Do you have difficulty concentrating, remembering or making decisions? No       Age-appropriate Screening Schedule:  Refer to the list below for future screening recommendations based on patient's age, sex and/or medical conditions. Orders for these recommended tests are listed in the plan section. The patient has been provided with a written plan.    Health Maintenance   Topic Date Due    TDAP/TD VACCINES (1 - Tdap) Never done    ZOSTER VACCINE (2 of 3) 11/28/2014    HEPATITIS C SCREENING  Never done    DXA SCAN  12/16/2021    INFLUENZA VACCINE  08/01/2023    COVID-19 Vaccine (4 - 2023-24 season) 09/01/2023    ANNUAL WELLNESS VISIT  04/14/2024    LIPID PANEL  11/28/2024    Pneumococcal Vaccine 65+  Completed                  CMS Preventative Services Quick Reference  Risk Factors Identified During Encounter  Immunizations Discussed/Encouraged: Influenza  The above risks/problems have been discussed with the patient.  Pertinent information has been shared with the patient in the After Visit Summary.  An After Visit Summary  "and PPPS were made available to the patient.    Follow Up:   Next Medicare Wellness visit to be scheduled in 1 year.       Additional E&M Note during same encounter follows:  Patient has multiple medical problems which are significant and separately identifiable that require additional work above and beyond the Medicare Wellness Visit.      Chief Complaint  Hyperlipidemia (Wants nausea med to keep on hand. Specailist told her to keep on hand several yrs ago. ) and Hypothyroidism    Subjective        HPI  Helen Rousseau is also being seen today for follow up on chol and thyroid  Cloudy urine and bad smell    Review of Systems   Constitutional: Negative.    Respiratory: Negative.     Cardiovascular: Negative.    Gastrointestinal:  Negative for nausea and vomiting.   Genitourinary:  Negative for flank pain, frequency and hematuria.       Objective   Vital Signs:  /79 (BP Location: Left arm, Patient Position: Sitting, Cuff Size: Adult)   Pulse 67   Temp 97.7 °F (36.5 °C) (Temporal)   Ht 166.4 cm (65.5\")   Wt 57.6 kg (127 lb)   SpO2 99%   BMI 20.81 kg/m²     Physical Exam  Vitals and nursing note reviewed.   Constitutional:       Appearance: Normal appearance. She is well-developed, well-groomed and normal weight.   Cardiovascular:      Rate and Rhythm: Normal rate and regular rhythm.      Heart sounds: Normal heart sounds.   Pulmonary:      Effort: Pulmonary effort is normal.      Breath sounds: Normal breath sounds.   Abdominal:      General: Abdomen is flat. Bowel sounds are normal.      Palpations: Abdomen is soft.      Tenderness: There is no abdominal tenderness. There is no right CVA tenderness, left CVA tenderness, guarding or rebound.   Musculoskeletal:      Right lower leg: No edema.      Left lower leg: No edema.   Neurological:      Mental Status: She is alert and oriented to person, place, and time.   Psychiatric:         Mood and Affect: Mood normal.         Behavior: Behavior is cooperative. "                    Brief Urine Lab Results  (Last result in the past 365 days)        Color   Clarity   Blood   Leuk Est   Nitrite   Protein   CREAT   Urine HCG        12/12/23 1001 Yellow   Cloudy   Trace   Large (3+)   Positive   Trace                      Assessment and Plan   Diagnoses and all orders for this visit:    1. Medicare annual wellness visit, subsequent (Primary)  -     Lipid Panel; Future  -     Comprehensive Metabolic Panel; Future    2. Pure hypercholesterolemia  -     Lipid Panel; Future  -     Comprehensive Metabolic Panel; Future    3. Hypothyroidism due to Hashimoto's thyroiditis  -     TSH; Future    4. Cloudy urine  -     Urinalysis With Culture If Indicated - Urine, Clean Catch; Future    5. Foul smelling urine  -     Urinalysis With Culture If Indicated - Urine, Clean Catch; Future    Other orders  -     SCANNED COGNITIVE ASSESSMENT      Overall she is doing very well.  She was counseled on the need for a flu shot.  For her cholesterol and thyroid a CMP lipid and TSH were ordered. for her cloudy foul-smelling urine a urinalysis was done and it was sent for culture.  She is up-to-date on her mammogram  I will see her back in a year       Follow Up   Return in about 1 year (around 11/28/2024) for Medicare Wellness.  Patient was given instructions and counseling regarding her condition or for health maintenance advice. Please see specific information pulled into the AVS if appropriate.

## 2023-11-28 NOTE — TELEPHONE ENCOUNTER
"Caller: Helen Rousseau \"Val\"    Relationship to patient: Self    Best call back number: [phone]    Patient is needing: PATIENT STATES HER  HAS AN APPOINTMENT AT 9:45 TODAY, WOULD LIKE TO SPEAK TO DR MATOS NURSE OR MA BEFORE THIS APPOINTMENT   PLEASE CALL         "

## 2023-11-28 NOTE — PATIENT INSTRUCTIONS
Consider flu shot    Advance Directive    Advance directives are legal documents that allow you to make decisions about your health care and medical treatment in case you become unable to communicate for yourself. Advance directives let your wishes be known to family, friends, and health care providers.  Discussing and writing advance directives should happen over time rather than all at once. Advance directives can be changed and updated at any time. There are different types of advance directives, such as:  Medical power of .  Living will.  Do not resuscitate (DNR) order or do not attempt resuscitation (DNAR) order.  Health care proxy and medical power of   A health care proxy is also called a health care agent. This person is appointed to make medical decisions for you when you are unable to make decisions for yourself. Generally, people ask a trusted friend or family member to act as their proxy and represent their preferences. Make sure you have an agreement with your trusted person to act as your proxy. A proxy may have to make a medical decision on your behalf if your wishes are not known.  A medical power of , also called a durable power of  for health care, is a legal document that names your health care proxy. Depending on the laws in your state, the document may need to be:  Signed.  Notarized.  Dated.  Copied.  Witnessed.  Incorporated into your medical record.  You may also want to appoint a trusted person to manage your money in the event you are unable to do so. This is called a durable power of  for finances. It is a separate legal document from the durable power of  for health care. You may choose your health care proxy or someone different to act as your agent in money matters.  If you do not appoint a proxy, or there is a concern that the proxy is not acting in your best interest, a court may appoint a guardian to act on your behalf.  Living will  A  living will is a set of instructions that state your wishes about medical care when you cannot express them yourself. Health care providers should keep a copy of your living will in your medical record. You may want to give a copy to family members or friends. To alert caregivers in case of an emergency, you can place a card in your wallet to let them know that you have a living will and where they can find it. A living will is used if you become:  Terminally ill.  Disabled.  Unable to communicate or make decisions.  The following decisions should be included in your living will:  To use or not to use life support equipment, such as dialysis machines and breathing machines (ventilators).  Whether you want a DNR or DNAR order. This tells health care providers not to use cardiopulmonary resuscitation (CPR) if breathing or heartbeat stops.  To use or not to use tube feeding.  To be given or not to be given food and fluids.  Whether you want comfort (palliative) care when the goal becomes comfort rather than a cure.  Whether you want to donate your organs and tissues.  A living will does not give instructions for distributing your money and property if you should pass away.  DNR or DNAR  A DNR or DNAR order is a request not to have CPR in the event that your heart stops beating or you stop breathing. If a DNR or DNAR order has not been made and shared, a health care provider will try to help any patient whose heart has stopped or who has stopped breathing. If you plan to have surgery, talk with your health care provider about how your DNR or DNAR order will be followed if problems occur.  What if I do not have an advance directive?  Some states assign family decision makers to act on your behalf if you do not have an advance directive. Each state has its own laws about advance directives. You may want to check with your health care provider, , or state representative about the laws in your  state.  Summary  Advance directives are legal documents that allow you to make decisions about your health care and medical treatment in case you become unable to communicate for yourself.  The process of discussing and writing advance directives should happen over time. You can change and update advance directives at any time.  Advance directives may include a medical power of , a living will, and a DNR or DNAR order.  This information is not intended to replace advice given to you by your health care provider. Make sure you discuss any questions you have with your health care provider.  Document Revised: 09/21/2021 Document Reviewed: 09/21/2021  Elsevier Patient Education © 2023 [employer]

## 2023-11-29 LAB
BACTERIA UR QL AUTO: ABNORMAL /HPF
BILIRUB UR QL STRIP: NEGATIVE
CLARITY UR: CLEAR
COLOR UR: YELLOW
GLUCOSE UR STRIP-MCNC: NEGATIVE MG/DL
HGB UR QL STRIP.AUTO: NEGATIVE
HOLD SPECIMEN: NORMAL
HYALINE CASTS UR QL AUTO: ABNORMAL /LPF
KETONES UR QL STRIP: NEGATIVE
LEUKOCYTE ESTERASE UR QL STRIP.AUTO: ABNORMAL
NITRITE UR QL STRIP: POSITIVE
PH UR STRIP.AUTO: 7 [PH] (ref 5–8)
PROT UR QL STRIP: NEGATIVE
RBC # UR STRIP: ABNORMAL /HPF
REF LAB TEST METHOD: ABNORMAL
SP GR UR STRIP: 1.01 (ref 1–1.03)
SQUAMOUS #/AREA URNS HPF: ABNORMAL /HPF
UROBILINOGEN UR QL STRIP: ABNORMAL
WBC # UR STRIP: ABNORMAL /HPF

## 2023-12-01 DIAGNOSIS — R79.89 ELEVATED LIVER FUNCTION TESTS: Primary | ICD-10-CM

## 2023-12-01 LAB — BACTERIA SPEC AEROBE CULT: ABNORMAL

## 2023-12-01 RX ORDER — NITROFURANTOIN 25; 75 MG/1; MG/1
100 CAPSULE ORAL 2 TIMES DAILY
Qty: 14 CAPSULE | Refills: 0 | Status: SHIPPED | OUTPATIENT
Start: 2023-12-01 | End: 2023-12-08

## 2023-12-07 RX ORDER — POTASSIUM CHLORIDE 750 MG/1
10 TABLET, FILM COATED, EXTENDED RELEASE ORAL DAILY
Qty: 90 TABLET | Refills: 2 | Status: SHIPPED | OUTPATIENT
Start: 2023-12-07

## 2023-12-07 RX ORDER — DICLOFENAC SODIUM 75 MG/1
75 TABLET, DELAYED RELEASE ORAL 2 TIMES DAILY WITH MEALS
Qty: 180 TABLET | Refills: 2 | Status: SHIPPED | OUTPATIENT
Start: 2023-12-07

## 2023-12-07 NOTE — TELEPHONE ENCOUNTER
She was just seen in November.  I do not have the correct dosing for her potassium on the chart and the prescription is not coming with instructions.  I need those instructions before I can refill the prescription please

## 2023-12-12 ENCOUNTER — LAB (OUTPATIENT)
Dept: FAMILY MEDICINE CLINIC | Facility: CLINIC | Age: 78
End: 2023-12-12
Payer: MEDICARE

## 2023-12-12 ENCOUNTER — TELEPHONE (OUTPATIENT)
Dept: FAMILY MEDICINE CLINIC | Facility: CLINIC | Age: 78
End: 2023-12-12

## 2023-12-12 ENCOUNTER — OFFICE VISIT (OUTPATIENT)
Dept: FAMILY MEDICINE CLINIC | Facility: CLINIC | Age: 78
End: 2023-12-12
Payer: MEDICARE

## 2023-12-12 VITALS
HEART RATE: 67 BPM | SYSTOLIC BLOOD PRESSURE: 137 MMHG | DIASTOLIC BLOOD PRESSURE: 79 MMHG | HEIGHT: 66 IN | WEIGHT: 129 LBS | TEMPERATURE: 97.5 F | OXYGEN SATURATION: 99 % | BODY MASS INDEX: 20.73 KG/M2

## 2023-12-12 DIAGNOSIS — R11.0 NAUSEA: ICD-10-CM

## 2023-12-12 DIAGNOSIS — E78.5 HYPERLIPIDEMIA, UNSPECIFIED HYPERLIPIDEMIA TYPE: ICD-10-CM

## 2023-12-12 DIAGNOSIS — R79.89 ELEVATED LIVER FUNCTION TESTS: ICD-10-CM

## 2023-12-12 DIAGNOSIS — R82.81 PYURIA: Primary | ICD-10-CM

## 2023-12-12 DIAGNOSIS — T78.40XS ALLERGY, SEQUELA: ICD-10-CM

## 2023-12-12 DIAGNOSIS — N39.0 RECURRENT UTI: ICD-10-CM

## 2023-12-12 PROBLEM — T78.40XA ALLERGIES: Status: ACTIVE | Noted: 2023-12-12

## 2023-12-12 LAB
ALBUMIN SERPL-MCNC: 4.4 G/DL (ref 3.5–5.2)
ALP SERPL-CCNC: 65 U/L (ref 39–117)
ALT SERPL W P-5'-P-CCNC: 27 U/L (ref 1–33)
AST SERPL-CCNC: 28 U/L (ref 1–32)
BILIRUB BLD-MCNC: NEGATIVE MG/DL
BILIRUB CONJ SERPL-MCNC: <0.2 MG/DL (ref 0–0.3)
BILIRUB INDIRECT SERPL-MCNC: NORMAL MG/DL
BILIRUB SERPL-MCNC: 0.4 MG/DL (ref 0–1.2)
CLARITY, POC: ABNORMAL
COLOR UR: YELLOW
EXPIRATION DATE: ABNORMAL
GLUCOSE UR STRIP-MCNC: NEGATIVE MG/DL
KETONES UR QL: NEGATIVE
LEUKOCYTE EST, POC: ABNORMAL
Lab: ABNORMAL
NITRITE UR-MCNC: POSITIVE MG/ML
PH UR: 7.5 [PH] (ref 5–8)
PROT SERPL-MCNC: 6.4 G/DL (ref 6–8.5)
PROT UR STRIP-MCNC: ABNORMAL MG/DL
RBC # UR STRIP: ABNORMAL /UL
SP GR UR: 1.01 (ref 1–1.03)
UROBILINOGEN UR QL: NORMAL

## 2023-12-12 PROCEDURE — 87086 URINE CULTURE/COLONY COUNT: CPT | Performed by: FAMILY MEDICINE

## 2023-12-12 PROCEDURE — 87088 URINE BACTERIA CULTURE: CPT | Performed by: FAMILY MEDICINE

## 2023-12-12 PROCEDURE — 87186 SC STD MICRODIL/AGAR DIL: CPT | Performed by: FAMILY MEDICINE

## 2023-12-12 PROCEDURE — 80076 HEPATIC FUNCTION PANEL: CPT | Performed by: FAMILY MEDICINE

## 2023-12-12 PROCEDURE — 36415 COLL VENOUS BLD VENIPUNCTURE: CPT

## 2023-12-12 RX ORDER — UBIDECARENONE 100 MG
100 CAPSULE ORAL DAILY
COMMUNITY

## 2023-12-12 RX ORDER — FLUTICASONE PROPIONATE 50 MCG
2 SPRAY, SUSPENSION (ML) NASAL DAILY
Qty: 18 ML | Refills: 11 | Status: SHIPPED | OUTPATIENT
Start: 2023-12-12

## 2023-12-12 RX ORDER — FLUTICASONE PROPIONATE 50 MCG
2 SPRAY, SUSPENSION (ML) NASAL DAILY
COMMUNITY
End: 2023-12-12 | Stop reason: SDUPTHER

## 2023-12-12 RX ORDER — PROMETHAZINE HYDROCHLORIDE 25 MG/1
25 TABLET ORAL EVERY 8 HOURS PRN
Qty: 30 TABLET | Refills: 0 | Status: SHIPPED | OUTPATIENT
Start: 2023-12-12

## 2023-12-12 NOTE — TELEPHONE ENCOUNTER
Prior Auth completed for Fluticasone Propionate 50MCG/ACT suspension via covermymeds. Pending determination.    (Key: [id number])  PA Case ID #: [id number]

## 2023-12-12 NOTE — TELEPHONE ENCOUNTER
Prior Authorization for Fluticasone Propionate 50MCG/ACT suspension approved.       Approved today  PA Case: [id number], Status: Approved, Coverage Starts on: 9/12/2023 12:00:00 AM, Coverage Ends on: 12/11/2024 12:00:00 AM.        Faxed approval to pharmacy.

## 2023-12-12 NOTE — PROGRESS NOTES
Subjective   Helen Rousseau is a 78 y.o. female.     History of Present Illness     Ms. Helen Rousseau is a 78-year-old female who presents today with several questions and concerns. She consents to the use of NAZ.    The patient is currently taking half of a 10 mg tablet of pravastatin. She would like to discontinue the medication. Her grandfather had a stroke when he was around 70 years of age. Her mother's sister also had a stroke. She has been experiencing muscle aches for the past couple of months.    She requests a refill of Flonase.    She requests a prescription for Phenergan or Zofran. She mentions she has not taken Zofran. The patient states she gets migraines periodically and uses an antiemetic then. She has a prescription at home that is about 5 to 6 years old. She has shared it with family and friends who have visited and been nauseous.     The patient would like to have her urine retested. She does not have symptoms but wants to be sure it is clear. She thinks maybe her urine was cloudy yesterday. She drinks a lot of water.    She has been experiencing leg cramps. She takes oral potassium.      The following portions of the patient's history were reviewed and updated as appropriate: allergies, current medications, past family history, past medical history, past social history, past surgical history, and problem list.  Past Medical History:   Diagnosis Date    Actinic keratosis     Atrial fibrillation     Basal cell adenocarcinoma     Bell's palsy     Left    Chronic constipation     DDD (degenerative disc disease), cervical     DDD (degenerative disc disease), cervical     Edema     GERD (gastroesophageal reflux disease) 2015    Headache     Hyperlipidemia     Hyperthyroidism 15/20 years ago    Hashimoto.    Hypothyroidism     Migraine headache     Mild carotid artery disease     Osteoarthritis     Statin-induced myositis     Urinary tract infection Have several a year     Past Surgical History:    Procedure Laterality Date    BASAL CELL CARCINOMA EXCISION      nose    BUNIONECTOMY Right     COLONOSCOPY      2016- due 5 years    HYSTERECTOMY      TUBAL ABDOMINAL LIGATION  ??      Family History   Problem Relation Age of Onset    Thyroid disease Mother         100 years bold    Diabetes Father         Diabetic    Hyperlipidemia Father         High blood pressure    Cancer Sister         Colon cancer    Hyperlipidemia Sister     Thyroid disease Sister     Diabetes Brother         Diabetic    Cancer Brother         Bladder cancer    Cancer Brother         Multiple Myeloma    Cancer Brother         Lung Cancer    Diabetes Brother         Diabetic.    Hyperlipidemia Brother     Hyperlipidemia Brother     Hyperlipidemia Brother     Kidney disease Sister     Thyroid disease Sister      Social History     Socioeconomic History    Marital status:    Tobacco Use    Smoking status: Former     Packs/day: 0.25     Years: 4.00     Additional pack years: 0.00     Total pack years: 1.00     Types: Cigarettes     Start date: 1963     Quit date: 1967     Years since quittin.9     Passive exposure: Past    Smokeless tobacco: Never    Tobacco comments:     2 to 4 cigarettes a day.   Vaping Use    Vaping Use: Never used   Substance and Sexual Activity    Alcohol use: Yes     Comment: Maybe a 2-4 ounce glass of wine 1 month.    Drug use: Never    Sexual activity: Yes     Partners: Male     Birth control/protection: Post-menopausal, Hysterectomy, Same-sex partner         Current Outpatient Medications:     aspirin 81 MG tablet, 1 tablet., Disp: , Rfl:     bisacodyl (DULCOLAX) 5 MG EC tablet, Take 1 tablet by mouth Daily As Needed for Constipation., Disp: , Rfl:     calcium (OS-ARMANDO) 600 MG tablet, TAKE ONE TABLET BY MOUTH EVERY DAY, Disp: 60 tablet, Rfl: 10    cetirizine (zyrTEC) 10 MG tablet, ZYRTEC ALLERGY 10 MG TABS, Disp: , Rfl:     coenzyme Q10 100 MG capsule, Take 1 capsule by mouth Daily., Disp: ,  "Rfl:     diclofenac (VOLTAREN) 75 MG EC tablet, Take 1 tablet by mouth 2 (Two) Times a Day With Meals., Disp: 180 tablet, Rfl: 2    fluticasone (FLONASE) 50 MCG/ACT nasal spray, 2 sprays into the nostril(s) as directed by provider Daily., Disp: 18 mL, Rfl: 11    Garlic 1000 MG capsule, Take 1 capsule by mouth Daily., Disp: , Rfl:     liothyronine (CYTOMEL) 5 MCG tablet, TAKE 2 TABLETS DAILY, Disp: 180 tablet, Rfl: 1    Misc Natural Products (Neuriva) capsule, Take  by mouth., Disp: , Rfl:     Multiple Vitamin (MULTIVITAMIN) capsule, MULTIVITAMINS CAPS, Disp: , Rfl:     omeprazole (priLOSEC) 40 MG capsule, TAKE 1 CAPSULE DAILY, Disp: 90 capsule, Rfl: 1    Polyethylene Glycol 3350 (MIRALAX PO), Take 1 each by mouth 4 (Four) Times a Week., Disp: , Rfl:     potassium chloride 10 MEQ CR tablet, Take 1 tablet by mouth Daily., Disp: 90 tablet, Rfl: 2    Synthroid 50 MCG tablet, TAKE 1 TABLET DAILY, Disp: 90 tablet, Rfl: 1    promethazine (PHENERGAN) 25 MG tablet, Take 1 tablet by mouth Every 8 (Eight) Hours As Needed for Nausea or Vomiting., Disp: 30 tablet, Rfl: 0    Review of Systems  Review of systems was performed, and pertinent findings are noted in the HPI.    /79 (BP Location: Left arm, Patient Position: Sitting, Cuff Size: Adult)   Pulse 67   Temp 97.5 °F (36.4 °C) (Temporal)   Ht 166.4 cm (65.5\")   Wt 58.5 kg (129 lb)   SpO2 99%   BMI 21.14 kg/m²   BMI is within normal parameters. No other follow-up for BMI required.       Objective   Physical Exam  Vitals and nursing note reviewed.   Constitutional:       Appearance: Normal appearance. She is normal weight.   Cardiovascular:      Rate and Rhythm: Normal rate and regular rhythm.      Heart sounds: Normal heart sounds.   Pulmonary:      Effort: Pulmonary effort is normal.      Breath sounds: Normal breath sounds.   Abdominal:      General: Abdomen is flat. Bowel sounds are normal.      Palpations: Abdomen is soft.      Tenderness: There is no abdominal " tenderness. There is no right CVA tenderness or left CVA tenderness.   Musculoskeletal:      Right lower leg: No edema.      Left lower leg: No edema.   Neurological:      Mental Status: She is alert.       Brief Urine Lab Results  (Last result in the past 365 days)        Color   Clarity   Blood   Leuk Est   Nitrite   Protein   CREAT   Urine HCG        12/12/23 1001 Yellow   Cloudy   Trace   Large (3+)   Positive   Trace                 Lab Results   Component Value Date    GLUCOSE 87 11/28/2023    BUN 16 11/28/2023    CREATININE 0.69 11/28/2023    EGFR 88.4 11/28/2023    BCR 23.2 11/28/2023    K 4.2 11/28/2023    CO2 29.9 (H) 11/28/2023    CALCIUM 9.4 11/28/2023    ALBUMIN 4.6 11/28/2023    BILITOT 0.4 11/28/2023    AST 34 (H) 11/28/2023    ALT 37 (H) 11/28/2023     Lab Results   Component Value Date    CHOL 170 11/28/2023    TRIG 71 11/28/2023    HDL 51 11/28/2023     (H) 11/28/2023         Assessment & Plan   Problems Addressed this Visit          Allergies and Adverse Reactions    Allergies       Cardiac and Vasculature    Hyperlipidemia       Gastrointestinal Abdominal     Nausea     Other Visit Diagnoses       Pyuria    -  Primary    Relevant Orders    POCT urinalysis dipstick, automated (Completed)    Urine Culture - Urine, Urine, Clean Catch    Recurrent UTI        Relevant Orders    Ambulatory Referral to Urology (Completed)    Elevated liver function tests              Diagnoses         Codes Comments    Pyuria    -  Primary ICD-10-CM: R82.81  ICD-9-CM: 791.9     Recurrent UTI     ICD-10-CM: N39.0  ICD-9-CM: 599.0     Elevated liver function tests     ICD-10-CM: R79.89  ICD-9-CM: 790.6     Hyperlipidemia, unspecified hyperlipidemia type     ICD-10-CM: E78.5  ICD-9-CM: 272.4     Allergy, sequela     ICD-10-CM: T78.40XS  ICD-9-CM: 909.9     Nausea     ICD-10-CM: R11.0  ICD-9-CM: 787.02             Medication list reviewed with patient    1. Pyuria  Her urinalysis is abnormal. I will go ahead and send  it for culture. She prefers not to have any treatment until after the culture results are back.    2. Recurrent urinary tract infections  I will get her in to see Urology for further evaluation. She has seen one in the past. Dr. Parker Mcgarry.    3. Elevated liver functions  She is going to have her repeat LFTs done today, and the order is already in the computer.    4. Hyperlipidemia  She has recurrent problems with muscle aches. She is only on half of a 10 mg pill of pravastatin, but wants to stop, so I have told her to go ahead and stop the medication. I have explained to her the benefits of the medication and the risk of not being on medication. Her most recent lipid panel was reviewed. Patient voices understanding.    5. Intermittent problems with nausea  Especially when she has a migraine. I have given her a prescription for Phenergan.    6. Allergies  I refilled her fluticasone.      Transcribed from ambient dictation for Maile Osorio MD by Sophia Rodriguez.  12/12/23   10:31 EST    Patient or patient representative verbalized consent to the visit recording.  I have personally performed the services described in this document as transcribed by the above individual, and it is both accurate and complete.

## 2023-12-13 NOTE — PROGRESS NOTES
Left message to return call to doctor's office at [facility]. Either to get test results or message from provider.

## 2023-12-14 DIAGNOSIS — N39.0 RECURRENT UTI: Primary | ICD-10-CM

## 2023-12-14 LAB — BACTERIA SPEC AEROBE CULT: ABNORMAL

## 2023-12-14 RX ORDER — NITROFURANTOIN 25; 75 MG/1; MG/1
100 CAPSULE ORAL 2 TIMES DAILY
Qty: 20 CAPSULE | Refills: 0 | Status: SHIPPED | OUTPATIENT
Start: 2023-12-14 | End: 2023-12-24

## 2023-12-23 ENCOUNTER — HOSPITAL ENCOUNTER (OUTPATIENT)
Facility: HOSPITAL | Age: 78
Setting detail: OBSERVATION
Discharge: HOME | End: 2023-12-25
Attending: EMERGENCY MEDICINE | Admitting: EMERGENCY MEDICINE
Payer: MEDICARE

## 2023-12-23 DIAGNOSIS — R11.2 NAUSEA AND VOMITING, UNSPECIFIED VOMITING TYPE: ICD-10-CM

## 2023-12-23 DIAGNOSIS — I48.91 NEW ONSET ATRIAL FIBRILLATION: Primary | ICD-10-CM

## 2023-12-23 LAB
ALBUMIN SERPL-MCNC: 4.6 G/DL (ref 3.5–5.2)
ALBUMIN/GLOB SERPL: 1.6 G/DL
ALP SERPL-CCNC: 72 U/L (ref 39–117)
ALT SERPL W P-5'-P-CCNC: 26 U/L (ref 1–33)
ANION GAP SERPL CALCULATED.3IONS-SCNC: 13 MMOL/L (ref 5–15)
AST SERPL-CCNC: 28 U/L (ref 1–32)
BACTERIA UR QL AUTO: ABNORMAL /HPF
BASOPHILS # BLD AUTO: 0 10*3/MM3 (ref 0–0.2)
BASOPHILS NFR BLD AUTO: 0.6 % (ref 0–1.5)
BILIRUB SERPL-MCNC: 0.6 MG/DL (ref 0–1.2)
BILIRUB UR QL STRIP: NEGATIVE
BUN SERPL-MCNC: 14 MG/DL (ref 8–23)
BUN/CREAT SERPL: 22.2 (ref 7–25)
CALCIUM SPEC-SCNC: 9.5 MG/DL (ref 8.6–10.5)
CHLORIDE SERPL-SCNC: 98 MMOL/L (ref 98–107)
CLARITY UR: ABNORMAL
CO2 SERPL-SCNC: 25 MMOL/L (ref 22–29)
COLOR UR: YELLOW
CREAT SERPL-MCNC: 0.63 MG/DL (ref 0.57–1)
DEPRECATED RDW RBC AUTO: 42 FL (ref 37–54)
EGFRCR SERPLBLD CKD-EPI 2021: 90.4 ML/MIN/1.73
EOSINOPHIL # BLD AUTO: 0 10*3/MM3 (ref 0–0.4)
EOSINOPHIL NFR BLD AUTO: 0 % (ref 0.3–6.2)
ERYTHROCYTE [DISTWIDTH] IN BLOOD BY AUTOMATED COUNT: 12.9 % (ref 12.3–15.4)
FLUAV SUBTYP SPEC NAA+PROBE: NOT DETECTED
FLUBV RNA ISLT QL NAA+PROBE: NOT DETECTED
GLOBULIN UR ELPH-MCNC: 2.8 GM/DL
GLUCOSE SERPL-MCNC: 131 MG/DL (ref 65–99)
GLUCOSE UR STRIP-MCNC: NEGATIVE MG/DL
HCT VFR BLD AUTO: 43.6 % (ref 34–46.6)
HGB BLD-MCNC: 14.2 G/DL (ref 12–15.9)
HGB UR QL STRIP.AUTO: ABNORMAL
HOLD SPECIMEN: NORMAL
HOLD SPECIMEN: NORMAL
HYALINE CASTS UR QL AUTO: ABNORMAL /LPF
KETONES UR QL STRIP: ABNORMAL
LEUKOCYTE ESTERASE UR QL STRIP.AUTO: NEGATIVE
LIPASE SERPL-CCNC: 19 U/L (ref 13–60)
LYMPHOCYTES # BLD AUTO: 0.6 10*3/MM3 (ref 0.7–3.1)
LYMPHOCYTES NFR BLD AUTO: 7.3 % (ref 19.6–45.3)
MCH RBC QN AUTO: 30.1 PG (ref 26.6–33)
MCHC RBC AUTO-ENTMCNC: 32.6 G/DL (ref 31.5–35.7)
MCV RBC AUTO: 92.3 FL (ref 79–97)
MONOCYTES # BLD AUTO: 0.1 10*3/MM3 (ref 0.1–0.9)
MONOCYTES NFR BLD AUTO: 1.3 % (ref 5–12)
NEUTROPHILS NFR BLD AUTO: 7.3 10*3/MM3 (ref 1.7–7)
NEUTROPHILS NFR BLD AUTO: 90.8 % (ref 42.7–76)
NITRITE UR QL STRIP: NEGATIVE
NRBC BLD AUTO-RTO: 0 /100 WBC (ref 0–0.2)
PH UR STRIP.AUTO: 7 [PH] (ref 5–8)
PLATELET # BLD AUTO: 361 10*3/MM3 (ref 140–450)
PMV BLD AUTO: 9 FL (ref 6–12)
POTASSIUM SERPL-SCNC: 4 MMOL/L (ref 3.5–5.2)
PROT SERPL-MCNC: 7.4 G/DL (ref 6–8.5)
PROT UR QL STRIP: ABNORMAL
RBC # BLD AUTO: 4.73 10*6/MM3 (ref 3.77–5.28)
RBC # UR STRIP: ABNORMAL /HPF
REF LAB TEST METHOD: ABNORMAL
SARS-COV-2 RNA RESP QL NAA+PROBE: NOT DETECTED
SODIUM SERPL-SCNC: 136 MMOL/L (ref 136–145)
SP GR UR STRIP: 1.02 (ref 1–1.03)
SQUAMOUS #/AREA URNS HPF: ABNORMAL /HPF
UROBILINOGEN UR QL STRIP: ABNORMAL
WBC # UR STRIP: ABNORMAL /HPF
WBC NRBC COR # BLD AUTO: 8 10*3/MM3 (ref 3.4–10.8)
WHOLE BLOOD HOLD COAG: NORMAL

## 2023-12-23 PROCEDURE — 25810000003 LACTATED RINGERS SOLUTION: Performed by: PHYSICIAN ASSISTANT

## 2023-12-23 PROCEDURE — 96374 THER/PROPH/DIAG INJ IV PUSH: CPT

## 2023-12-23 PROCEDURE — 83690 ASSAY OF LIPASE: CPT | Performed by: PHYSICIAN ASSISTANT

## 2023-12-23 PROCEDURE — 87636 SARSCOV2 & INF A&B AMP PRB: CPT | Performed by: PHYSICIAN ASSISTANT

## 2023-12-23 PROCEDURE — 84443 ASSAY THYROID STIM HORMONE: CPT | Performed by: PHYSICIAN ASSISTANT

## 2023-12-23 PROCEDURE — 80053 COMPREHEN METABOLIC PANEL: CPT | Performed by: PHYSICIAN ASSISTANT

## 2023-12-23 PROCEDURE — 93005 ELECTROCARDIOGRAM TRACING: CPT | Performed by: PHYSICIAN ASSISTANT

## 2023-12-23 PROCEDURE — 99284 EMERGENCY DEPT VISIT MOD MDM: CPT

## 2023-12-23 PROCEDURE — 85025 COMPLETE CBC W/AUTO DIFF WBC: CPT | Performed by: PHYSICIAN ASSISTANT

## 2023-12-23 PROCEDURE — 81001 URINALYSIS AUTO W/SCOPE: CPT | Performed by: PHYSICIAN ASSISTANT

## 2023-12-23 PROCEDURE — 83735 ASSAY OF MAGNESIUM: CPT | Performed by: PHYSICIAN ASSISTANT

## 2023-12-23 PROCEDURE — 25010000002 ONDANSETRON PER 1 MG: Performed by: PHYSICIAN ASSISTANT

## 2023-12-23 RX ORDER — ONDANSETRON 2 MG/ML
4 INJECTION INTRAMUSCULAR; INTRAVENOUS ONCE
Status: COMPLETED | OUTPATIENT
Start: 2023-12-23 | End: 2023-12-23

## 2023-12-23 RX ORDER — SODIUM CHLORIDE 0.9 % (FLUSH) 0.9 %
10 SYRINGE (ML) INJECTION AS NEEDED
Status: DISCONTINUED | OUTPATIENT
Start: 2023-12-23 | End: 2023-12-25 | Stop reason: HOSPADM

## 2023-12-23 RX ADMIN — ONDANSETRON 4 MG: 2 INJECTION INTRAMUSCULAR; INTRAVENOUS at 22:12

## 2023-12-23 RX ADMIN — SODIUM CHLORIDE, POTASSIUM CHLORIDE, SODIUM LACTATE AND CALCIUM CHLORIDE 1000 ML: 600; 310; 30; 20 INJECTION, SOLUTION INTRAVENOUS at 22:13

## 2023-12-24 ENCOUNTER — APPOINTMENT (OUTPATIENT)
Dept: NUCLEAR MEDICINE | Facility: HOSPITAL | Age: 78
End: 2023-12-24
Payer: MEDICARE

## 2023-12-24 PROBLEM — I48.91 NEW ONSET A-FIB: Status: ACTIVE | Noted: 2023-12-24

## 2023-12-24 LAB
ANION GAP SERPL CALCULATED.3IONS-SCNC: 9 MMOL/L (ref 5–15)
BASOPHILS # BLD AUTO: 0 10*3/MM3 (ref 0–0.2)
BASOPHILS NFR BLD AUTO: 0.4 % (ref 0–1.5)
BH CV NUCLEAR PRIOR STUDY: 3
BH CV REST NUCLEAR ISOTOPE DOSE: 9 MCI
BH CV STRESS BP STAGE 1: NORMAL
BH CV STRESS BP STAGE 2: NORMAL
BH CV STRESS BP STAGE 3: NORMAL
BH CV STRESS COMMENTS STAGE 1: NORMAL
BH CV STRESS COMMENTS STAGE 2: NORMAL
BH CV STRESS DOSE REGADENOSON STAGE 1: 0.4
BH CV STRESS DURATION MIN STAGE 1: 0
BH CV STRESS DURATION MIN STAGE 2: 4
BH CV STRESS DURATION SEC STAGE 1: 10
BH CV STRESS DURATION SEC STAGE 2: 0
BH CV STRESS HR STAGE 1: 78
BH CV STRESS HR STAGE 2: 94
BH CV STRESS HR STAGE 3: 95
BH CV STRESS NUCLEAR ISOTOPE DOSE: 27.6 MCI
BH CV STRESS PROTOCOL 1: NORMAL
BH CV STRESS RECOVERY BP: NORMAL MMHG
BH CV STRESS RECOVERY HR: 93 BPM
BH CV STRESS STAGE 1: 1
BH CV STRESS STAGE 2: 2
BH CV STRESS STAGE 3: 3
BUN SERPL-MCNC: 11 MG/DL (ref 8–23)
BUN/CREAT SERPL: 20.8 (ref 7–25)
CALCIUM SPEC-SCNC: 8.9 MG/DL (ref 8.6–10.5)
CHLORIDE SERPL-SCNC: 104 MMOL/L (ref 98–107)
CO2 SERPL-SCNC: 28 MMOL/L (ref 22–29)
CREAT SERPL-MCNC: 0.53 MG/DL (ref 0.57–1)
DEPRECATED RDW RBC AUTO: 40.7 FL (ref 37–54)
EGFRCR SERPLBLD CKD-EPI 2021: 94.2 ML/MIN/1.73
EOSINOPHIL # BLD AUTO: 0 10*3/MM3 (ref 0–0.4)
EOSINOPHIL NFR BLD AUTO: 0.1 % (ref 0.3–6.2)
ERYTHROCYTE [DISTWIDTH] IN BLOOD BY AUTOMATED COUNT: 12.6 % (ref 12.3–15.4)
GEN 5 2HR TROPONIN T REFLEX: 25 NG/L
GLUCOSE SERPL-MCNC: 83 MG/DL (ref 65–99)
HCT VFR BLD AUTO: 35.6 % (ref 34–46.6)
HGB BLD-MCNC: 12.1 G/DL (ref 12–15.9)
LYMPHOCYTES # BLD AUTO: 1.4 10*3/MM3 (ref 0.7–3.1)
LYMPHOCYTES NFR BLD AUTO: 20.3 % (ref 19.6–45.3)
MAGNESIUM SERPL-MCNC: 1.7 MG/DL (ref 1.6–2.4)
MAXIMAL PREDICTED HEART RATE: 141 BPM
MCH RBC QN AUTO: 31.4 PG (ref 26.6–33)
MCHC RBC AUTO-ENTMCNC: 33.9 G/DL (ref 31.5–35.7)
MCV RBC AUTO: 92.8 FL (ref 79–97)
MONOCYTES # BLD AUTO: 0.5 10*3/MM3 (ref 0.1–0.9)
MONOCYTES NFR BLD AUTO: 7.7 % (ref 5–12)
NEUTROPHILS NFR BLD AUTO: 4.9 10*3/MM3 (ref 1.7–7)
NEUTROPHILS NFR BLD AUTO: 71.5 % (ref 42.7–76)
NRBC BLD AUTO-RTO: 0.1 /100 WBC (ref 0–0.2)
PERCENT MAX PREDICTED HR: 67.38 %
PLATELET # BLD AUTO: 294 10*3/MM3 (ref 140–450)
PMV BLD AUTO: 8.9 FL (ref 6–12)
POTASSIUM SERPL-SCNC: 3.8 MMOL/L (ref 3.5–5.2)
QT INTERVAL: 339 MS
QTC INTERVAL: 456 MS
RBC # BLD AUTO: 3.83 10*6/MM3 (ref 3.77–5.28)
SODIUM SERPL-SCNC: 141 MMOL/L (ref 136–145)
STRESS BASELINE BP: NORMAL MMHG
STRESS BASELINE HR: 78 BPM
STRESS PERCENT HR: 79 %
STRESS POST PEAK BP: NORMAL MMHG
STRESS POST PEAK HR: 95 BPM
STRESS TARGET HR: 120 BPM
TROPONIN T DELTA: 7 NG/L
TROPONIN T SERPL HS-MCNC: 18 NG/L
TSH SERPL DL<=0.05 MIU/L-ACNC: 0.53 UIU/ML (ref 0.27–4.2)
WBC NRBC COR # BLD AUTO: 6.9 10*3/MM3 (ref 3.4–10.8)

## 2023-12-24 PROCEDURE — 93017 CV STRESS TEST TRACING ONLY: CPT

## 2023-12-24 PROCEDURE — G0378 HOSPITAL OBSERVATION PER HR: HCPCS

## 2023-12-24 PROCEDURE — A9502 TC99M TETROFOSMIN: HCPCS | Performed by: EMERGENCY MEDICINE

## 2023-12-24 PROCEDURE — 78452 HT MUSCLE IMAGE SPECT MULT: CPT

## 2023-12-24 PROCEDURE — 99204 OFFICE O/P NEW MOD 45 MIN: CPT | Performed by: INTERNAL MEDICINE

## 2023-12-24 PROCEDURE — 25010000002 REGADENOSON 0.4 MG/5ML SOLUTION: Performed by: EMERGENCY MEDICINE

## 2023-12-24 PROCEDURE — 96372 THER/PROPH/DIAG INJ SC/IM: CPT

## 2023-12-24 PROCEDURE — 80048 BASIC METABOLIC PNL TOTAL CA: CPT | Performed by: PHYSICIAN ASSISTANT

## 2023-12-24 PROCEDURE — 84484 ASSAY OF TROPONIN QUANT: CPT | Performed by: PHYSICIAN ASSISTANT

## 2023-12-24 PROCEDURE — 0 TECHNETIUM TETROFOSMIN KIT: Performed by: EMERGENCY MEDICINE

## 2023-12-24 PROCEDURE — 96375 TX/PRO/DX INJ NEW DRUG ADDON: CPT

## 2023-12-24 PROCEDURE — 78452 HT MUSCLE IMAGE SPECT MULT: CPT | Performed by: INTERNAL MEDICINE

## 2023-12-24 PROCEDURE — 93018 CV STRESS TEST I&R ONLY: CPT | Performed by: INTERNAL MEDICINE

## 2023-12-24 PROCEDURE — 93016 CV STRESS TEST SUPVJ ONLY: CPT

## 2023-12-24 PROCEDURE — 85025 COMPLETE CBC W/AUTO DIFF WBC: CPT | Performed by: PHYSICIAN ASSISTANT

## 2023-12-24 PROCEDURE — 93005 ELECTROCARDIOGRAM TRACING: CPT | Performed by: PHYSICIAN ASSISTANT

## 2023-12-24 PROCEDURE — 25010000002 ENOXAPARIN PER 10 MG: Performed by: PHYSICIAN ASSISTANT

## 2023-12-24 RX ORDER — CHOLECALCIFEROL (VITAMIN D3) 125 MCG
5 CAPSULE ORAL NIGHTLY PRN
Status: DISCONTINUED | OUTPATIENT
Start: 2023-12-24 | End: 2023-12-25 | Stop reason: HOSPADM

## 2023-12-24 RX ORDER — REGADENOSON 0.08 MG/ML
0.4 INJECTION, SOLUTION INTRAVENOUS
Status: COMPLETED | OUTPATIENT
Start: 2023-12-24 | End: 2023-12-24

## 2023-12-24 RX ORDER — LIOTHYRONINE SODIUM 5 UG/1
10 TABLET ORAL DAILY
Status: DISCONTINUED | OUTPATIENT
Start: 2023-12-24 | End: 2023-12-25 | Stop reason: HOSPADM

## 2023-12-24 RX ORDER — DILTIAZEM HYDROCHLORIDE 5 MG/ML
10 INJECTION INTRAVENOUS ONCE
Status: COMPLETED | OUTPATIENT
Start: 2023-12-24 | End: 2023-12-24

## 2023-12-24 RX ORDER — ONDANSETRON 2 MG/ML
4 INJECTION INTRAMUSCULAR; INTRAVENOUS EVERY 6 HOURS PRN
Status: DISCONTINUED | OUTPATIENT
Start: 2023-12-24 | End: 2023-12-25 | Stop reason: HOSPADM

## 2023-12-24 RX ORDER — ENOXAPARIN SODIUM 100 MG/ML
1 INJECTION SUBCUTANEOUS EVERY 12 HOURS
Status: DISCONTINUED | OUTPATIENT
Start: 2023-12-24 | End: 2023-12-24

## 2023-12-24 RX ORDER — AMOXICILLIN 250 MG
2 CAPSULE ORAL 2 TIMES DAILY
Status: DISCONTINUED | OUTPATIENT
Start: 2023-12-24 | End: 2023-12-25 | Stop reason: HOSPADM

## 2023-12-24 RX ORDER — LEVOTHYROXINE SODIUM 0.05 MG/1
50 TABLET ORAL
Status: DISCONTINUED | OUTPATIENT
Start: 2023-12-24 | End: 2023-12-25 | Stop reason: HOSPADM

## 2023-12-24 RX ORDER — DILTIAZEM HYDROCHLORIDE 240 MG/1
240 CAPSULE, COATED, EXTENDED RELEASE ORAL
Status: DISCONTINUED | OUTPATIENT
Start: 2023-12-24 | End: 2023-12-25 | Stop reason: HOSPADM

## 2023-12-24 RX ORDER — ACETAMINOPHEN 325 MG/1
650 TABLET ORAL EVERY 4 HOURS PRN
Status: DISCONTINUED | OUTPATIENT
Start: 2023-12-24 | End: 2023-12-25 | Stop reason: HOSPADM

## 2023-12-24 RX ORDER — ASPIRIN 81 MG/1
81 TABLET ORAL DAILY
Status: DISCONTINUED | OUTPATIENT
Start: 2023-12-24 | End: 2023-12-24

## 2023-12-24 RX ORDER — CETIRIZINE HYDROCHLORIDE 10 MG/1
10 TABLET ORAL DAILY
Status: DISCONTINUED | OUTPATIENT
Start: 2023-12-24 | End: 2023-12-25 | Stop reason: HOSPADM

## 2023-12-24 RX ORDER — PANTOPRAZOLE SODIUM 40 MG/1
40 TABLET, DELAYED RELEASE ORAL
Status: DISCONTINUED | OUTPATIENT
Start: 2023-12-24 | End: 2023-12-25 | Stop reason: HOSPADM

## 2023-12-24 RX ORDER — BISACODYL 5 MG/1
5 TABLET, DELAYED RELEASE ORAL DAILY PRN
Status: DISCONTINUED | OUTPATIENT
Start: 2023-12-24 | End: 2023-12-25 | Stop reason: HOSPADM

## 2023-12-24 RX ORDER — POLYETHYLENE GLYCOL 3350 17 G/17G
17 POWDER, FOR SOLUTION ORAL DAILY PRN
Status: DISCONTINUED | OUTPATIENT
Start: 2023-12-24 | End: 2023-12-25 | Stop reason: HOSPADM

## 2023-12-24 RX ORDER — BISACODYL 10 MG
10 SUPPOSITORY, RECTAL RECTAL DAILY PRN
Status: DISCONTINUED | OUTPATIENT
Start: 2023-12-24 | End: 2023-12-25 | Stop reason: HOSPADM

## 2023-12-24 RX ADMIN — DILTIAZEM HYDROCHLORIDE 10 MG: 5 INJECTION, SOLUTION INTRAVENOUS at 00:44

## 2023-12-24 RX ADMIN — SENNOSIDES AND DOCUSATE SODIUM 2 TABLET: 50; 8.6 TABLET ORAL at 20:59

## 2023-12-24 RX ADMIN — CETIRIZINE HYDROCHLORIDE 10 MG: 10 TABLET, FILM COATED ORAL at 09:12

## 2023-12-24 RX ADMIN — REGADENOSON 0.4 MG: 0.08 INJECTION, SOLUTION INTRAVENOUS at 11:44

## 2023-12-24 RX ADMIN — LIOTHYRONINE SODIUM 10 MCG: 5 TABLET ORAL at 09:12

## 2023-12-24 RX ADMIN — ENOXAPARIN SODIUM 60 MG: 100 INJECTION SUBCUTANEOUS at 06:09

## 2023-12-24 RX ADMIN — ENOXAPARIN SODIUM 60 MG: 100 INJECTION SUBCUTANEOUS at 17:02

## 2023-12-24 RX ADMIN — DILTIAZEM HYDROCHLORIDE 240 MG: 240 CAPSULE, COATED, EXTENDED RELEASE ORAL at 17:02

## 2023-12-24 RX ADMIN — Medication 10 ML: at 20:59

## 2023-12-24 RX ADMIN — ASPIRIN 81 MG: 81 TABLET, COATED ORAL at 09:12

## 2023-12-24 RX ADMIN — TETROFOSMIN 1 DOSE: 1.38 INJECTION, POWDER, LYOPHILIZED, FOR SOLUTION INTRAVENOUS at 11:45

## 2023-12-24 RX ADMIN — TETROFOSMIN 1 DOSE: 1.38 INJECTION, POWDER, LYOPHILIZED, FOR SOLUTION INTRAVENOUS at 10:41

## 2023-12-24 RX ADMIN — PANTOPRAZOLE SODIUM 40 MG: 40 TABLET, DELAYED RELEASE ORAL at 09:00

## 2023-12-24 NOTE — ED PROVIDER NOTES
Subjective   History of Present Illness  Chief Complaint: Nausea vomiting    Patient is a 78-year-old  female with history of paroxysmal A-fib not on anticoagulation, hyperlipidemia, GERD presents to the ER with complaints of nausea vomiting since 5 AM this morning.  Patient states that she has felt nauseous all day.  She has had 15+ episodes of vomiting.  No hematemesis.  She also reports a few episodes of diarrhea but no hematochezia or melena.  She has no localized pain.  No chest pain shortness of breath.  She does feel lightheaded because she states she has not been able to keep anything down.  No lower extremity swelling.  No fever.  Patient reports that her  threw up yesterday but has not thrown up today.  They did not eat the same food.  No recent sick contacts that she is aware of.    PCP: Maile Osorio    History provided by:  Patient      Review of Systems   Constitutional:  Negative for chills and fever.   HENT:  Negative for sore throat and trouble swallowing.    Eyes: Negative.    Respiratory:  Negative for shortness of breath and wheezing.    Cardiovascular:  Negative for chest pain.   Gastrointestinal:  Positive for diarrhea, nausea and vomiting. Negative for abdominal pain.   Endocrine: Negative.    Genitourinary:  Negative for dysuria.   Musculoskeletal:  Negative for myalgias.   Skin:  Negative for rash.   Allergic/Immunologic: Negative.    Neurological:  Positive for light-headedness. Negative for headaches.   Psychiatric/Behavioral:  Negative for behavioral problems.    All other systems reviewed and are negative.      Past Medical History:   Diagnosis Date    Actinic keratosis     Atrial fibrillation     Basal cell adenocarcinoma     Bell's palsy     Left    Chronic constipation     DDD (degenerative disc disease), cervical     DDD (degenerative disc disease), cervical     Edema     GERD (gastroesophageal reflux disease) 2015    Headache     Hyperlipidemia      Hyperthyroidism 15/20 years ago    Hashimoto.    Hypothyroidism     Migraine headache     Mild carotid artery disease     Osteoarthritis     Statin-induced myositis     Urinary tract infection Have several a year       Allergies   Allergen Reactions    Atorvastatin Calcium Myalgia    Pravastatin Sodium Myalgia    Simvastatin Myalgia    Sulfamethoxazole-Trimethoprim Rash       Past Surgical History:   Procedure Laterality Date    BASAL CELL CARCINOMA EXCISION      nose    BUNIONECTOMY Right     COLONOSCOPY      2016- due 5 years    HYSTERECTOMY      TUBAL ABDOMINAL LIGATION  ??        Family History   Problem Relation Age of Onset    Thyroid disease Mother         100 years bold    Diabetes Father         Diabetic    Hyperlipidemia Father         High blood pressure    Cancer Sister         Colon cancer    Hyperlipidemia Sister     Thyroid disease Sister     Diabetes Brother         Diabetic    Cancer Brother         Bladder cancer    Cancer Brother         Multiple Myeloma    Cancer Brother         Lung Cancer    Diabetes Brother         Diabetic.    Hyperlipidemia Brother     Hyperlipidemia Brother     Hyperlipidemia Brother     Kidney disease Sister     Thyroid disease Sister        Social History     Socioeconomic History    Marital status:    Tobacco Use    Smoking status: Former     Packs/day: 0.25     Years: 4.00     Additional pack years: 0.00     Total pack years: 1.00     Types: Cigarettes     Start date: 1963     Quit date: 1967     Years since quittin.0     Passive exposure: Past    Smokeless tobacco: Never    Tobacco comments:     2 to 4 cigarettes a day.   Vaping Use    Vaping Use: Never used   Substance and Sexual Activity    Alcohol use: Yes     Comment: Maybe a 2-4 ounce glass of wine 1 month.    Drug use: Never    Sexual activity: Yes     Partners: Male     Birth control/protection: Post-menopausal, Hysterectomy, Same-sex partner           Objective   Physical Exam  Vitals  and nursing note reviewed.   Constitutional:       Appearance: Normal appearance. She is well-developed and normal weight. She is not ill-appearing or toxic-appearing.   HENT:      Head: Normocephalic and atraumatic.      Mouth/Throat:      Mouth: Mucous membranes are moist.      Pharynx: No oropharyngeal exudate.   Eyes:      Pupils: Pupils are equal, round, and reactive to light.   Cardiovascular:      Rate and Rhythm: Tachycardia present. Rhythm irregular.      Pulses: Normal pulses.      Heart sounds: Normal heart sounds. No murmur heard.  Pulmonary:      Effort: Pulmonary effort is normal. No respiratory distress.      Breath sounds: Normal breath sounds. No wheezing.   Abdominal:      General: Bowel sounds are normal. There is no distension.      Palpations: Abdomen is soft.      Tenderness: There is no abdominal tenderness.   Skin:     General: Skin is warm and dry.      Capillary Refill: Capillary refill takes less than 2 seconds.      Findings: No rash.   Neurological:      General: No focal deficit present.      Mental Status: She is alert and oriented to person, place, and time.   Psychiatric:         Mood and Affect: Mood normal.         Behavior: Behavior normal.         ECG 12 Lead      Date/Time: 12/23/2023 10:08 PM    Performed by: Elif Turcios PA  Authorized by: Mark Ma MD  Interpreted by physician  Previous ECG: no previous ECG available  Rhythm: atrial fibrillation  Rate: tachycardic  BPM: 117  QRS axis: normal  Conduction: conduction normal  Clinical impression: non-specific ECG               ED Course  ED Course as of 12/24/23 0508   Sun Dec 24, 2023   0131 Patient reevaluated, reports feeling much better after IV fluids and Zofran.  She is able to tolerate p.o.  Patient still currently in atrial fibrillation. [MC]      ED Course User Index  [MC] Elif Turcios PA    /77 (BP Location: Right arm, Patient Position: Lying)   Pulse 88   Temp 97.9 °F (36.6 °C) (Oral)   Resp  "18   Ht 170.2 cm (67\")   Wt 56 kg (123 lb 7.3 oz)   SpO2 98%   BMI 19.34 kg/m²   Labs Reviewed   COMPREHENSIVE METABOLIC PANEL - Abnormal; Notable for the following components:       Result Value    Glucose 131 (*)     All other components within normal limits    Narrative:     GFR Normal >60  Chronic Kidney Disease <60  Kidney Failure <15    The GFR formula is only valid for adults with stable renal function between ages 18 and 70.   URINALYSIS W/ CULTURE IF INDICATED - Abnormal; Notable for the following components:    Appearance, UA Cloudy (*)     Ketones, UA 80 mg/dL (3+) (*)     Blood, UA Trace (*)     Protein, UA 30 mg/dL (1+) (*)     All other components within normal limits    Narrative:     In absence of clinical symptoms, the presence of pyuria, bacteria, and/or nitrites on the urinalysis result does not correlate with infection.   CBC WITH AUTO DIFFERENTIAL - Abnormal; Notable for the following components:    Neutrophil % 90.8 (*)     Lymphocyte % 7.3 (*)     Monocyte % 1.3 (*)     Eosinophil % 0.0 (*)     Neutrophils, Absolute 7.30 (*)     Lymphocytes, Absolute 0.60 (*)     All other components within normal limits   URINALYSIS, MICROSCOPIC ONLY - Abnormal; Notable for the following components:    RBC, UA 3-5 (*)     WBC, UA 3-5 (*)     Squamous Epithelial Cells, UA 3-6 (*)     All other components within normal limits   COVID-19 AND FLU A/B, NP SWAB IN TRANSPORT MEDIA 1 HR TAT - Normal    Narrative:     Fact sheet for providers: https://www.fda.gov/media/727202/download    Fact sheet for patients: https://www.fda.gov/media/343066/download    Test performed by PCR.   LIPASE - Normal   TSH - Normal   MAGNESIUM - Normal   RAINBOW DRAW    Narrative:     The following orders were created for panel order [place] Draw.  Procedure                               Abnormality         Status                     ---------                               -----------         ------                     Green Top " (Gel)[[id number]]                                  Final result               Lavender Top[[id number]]                                                                Gold Top - SST[[id number]]                                   Final result               Light Blue Top[[id number]]                                   Final result                 Please view results for these tests on the individual orders.   BASIC METABOLIC PANEL   CBC WITH AUTO DIFFERENTIAL   TROPONIN   GREEN TOP   GOLD TOP - SST   LIGHT BLUE TOP   CBC AND DIFFERENTIAL    Narrative:     The following orders were created for panel order CBC & Differential.  Procedure                               Abnormality         Status                     ---------                               -----------         ------                     CBC Auto Differential[[id number]]        Abnormal            Final result                 Please view results for these tests on the individual orders.     Medications   sodium chloride 0.9 % flush 10 mL (has no administration in time range)   Pharmacy to Dose enoxaparin (LOVENOX) (has no administration in time range)   acetaminophen (TYLENOL) tablet 650 mg (has no administration in time range)   sennosides-docusate (PERICOLACE) 8.6-50 MG per tablet 2 tablet (has no administration in time range)     And   polyethylene glycol (MIRALAX) packet 17 g (has no administration in time range)     And   bisacodyl (DULCOLAX) EC tablet 5 mg (has no administration in time range)     And   bisacodyl (DULCOLAX) suppository 10 mg (has no administration in time range)   ondansetron (ZOFRAN) injection 4 mg (has no administration in time range)   melatonin tablet 5 mg (has no administration in time range)   metoprolol tartrate (LOPRESSOR) injection 2.5 mg (has no administration in time range)   Enoxaparin Sodium (LOVENOX) syringe 60 mg (has no administration in time range)   lactated ringers bolus 1,000 mL ( Intravenous Currently Infusing 12/24/23 0048)    ondansetron (ZOFRAN) injection 4 mg (4 mg Intravenous Given 12/23/23 2212)   dilTIAZem (CARDIZEM) injection 10 mg (10 mg Intravenous Given 12/24/23 0044)     No radiology results for the last day                                           Medical Decision Making  Differential Dx (Includes but not limited to): A-fib, flutter, dysrhythmia, electrolyte abnormality, flu, COVID, UTI   Medical Records Reviewed: Patient was previously seen by etiology for 1 episode of A-fib.  No cardiac workup noted in epic  Labs: On my interpretation, CBC no leukocytosis.  Normal magnesium, lipase, COVID and flu  Imaging: N/A, patient denies chest pain shortness of breath or localized abdominal pain  Telemetry: EKG interpretation: Reviewed by myself interpreted by ER attending, Dr. Ma atrial fibrillation with a rate of 117 with no acute ST changes  Testing considered but not ordered: CT abdomen pelvis patient has no localized abdominal tenderness.  She is benign abdominal exam, blood work normal  Nature of Complaint: Acute  Admission vs Discharge: Admission  Discussion: While in the ED IV was placed and labs were obtained appropriate PPE was worn during exam and throughout all encounters with the patient.  Patient had the above evaluation she is afebrile, nontoxic parents in no acute distress.  Abdomen soft and nontender.  Patient was noted to be in atrial fibrillation upon ER arrival.  She was placed on continuous telemetry monitoring and this was confirmed with EKG.  Patient was given 1 dose of Cardizem for rate control which improved her rate but patient remained in atrial fibrillation.  She was given Zofran and IV fluids and had complete resolution of her nausea and was able to tolerate p.o.  She had no diarrhea.  Patient was placed in ED observation for further IV hydration, cardiology consultation and echo due to new onset A-fib.  [id number] score 3 on my interpretation.  Patient agreeable with plan of care.  Patient  otherwise unremarkable ER stay.    Problems Addressed:  Nausea and vomiting, unspecified vomiting type: acute illness or injury  New onset atrial fibrillation: acute illness or injury    Amount and/or Complexity of Data Reviewed  Labs: ordered. Decision-making details documented in ED Course.  ECG/medicine tests: ordered. Decision-making details documented in ED Course.    Risk  OTC drugs.  Prescription drug management.  Decision regarding hospitalization.        Final diagnoses:   New onset atrial fibrillation   Nausea and vomiting, unspecified vomiting type       ED Disposition  ED Disposition       ED Disposition   Decision to Admit    Condition   --    Comment   --               No follow-up provider specified.       Medication List      No changes were made to your prescriptions during this visit.            Elif Turcios PA  12/24/23 6776

## 2023-12-24 NOTE — CONSULTS
Referring Provider: Mark Ma MD  Reason for Consultation:  Palpitations  Atrial fibrillation    Patient Care Team:  Maile Osorio MD as PCP - General (Family Medicine)    Chief complaint  Palpitations      Subjective .     History of present illness:  Helen Rousseau is a 78 y.o. female who presents with history of paroxysmal atrial fibrillation and multiple cardiac and noncardiac problems was admitted to the hospital with history of nausea and vomiting since 5 AM on the day of admission.  Patient has been feeling nauseous all day.  Patient had multiple episodes of vomiting.  No hematemesis.  Not having any loose stools.  Denies having any chest discomfort heaviness or tightness in the chest.  Patient came to the emergency room.  Patient also was in atrial fibrillation.  No other associated aggravating or elevating factors.  Cardiology consultation was requested.      ROS      The patient has been without any chest discomfort, shortness of breath, palpitations, dizziness or syncope.  Denies having any headache, abdominal pain diarrhea, constipation, loss of weight or loss of appetite.  Denies having any excessive bruising, hematuria or blood in the stool.    Review of all systems negative except as indicated      History  Past Medical History:   Diagnosis Date    Actinic keratosis     Atrial fibrillation     Basal cell adenocarcinoma     Bell's palsy     Left    Chronic constipation     DDD (degenerative disc disease), cervical     DDD (degenerative disc disease), cervical     Edema     GERD (gastroesophageal reflux disease) 2015    Headache     Hyperlipidemia     Hyperthyroidism 15/20 years ago    Hashimoto.    Hypothyroidism     Migraine headache     Mild carotid artery disease     Osteoarthritis     Statin-induced myositis     Urinary tract infection Have several a year       Past Surgical History:   Procedure Laterality Date    BASAL CELL CARCINOMA EXCISION      nose    BUNIONECTOMY  Right     COLONOSCOPY      2016- due 5 years    HYSTERECTOMY      TUBAL ABDOMINAL LIGATION  ??        Family History   Problem Relation Age of Onset    Thyroid disease Mother         100 years bold    Diabetes Father         Diabetic    Hyperlipidemia Father         High blood pressure    Cancer Sister         Colon cancer    Hyperlipidemia Sister     Thyroid disease Sister     Diabetes Brother         Diabetic    Cancer Brother         Bladder cancer    Cancer Brother         Multiple Myeloma    Cancer Brother         Lung Cancer    Diabetes Brother         Diabetic.    Hyperlipidemia Brother     Hyperlipidemia Brother     Hyperlipidemia Brother     Kidney disease Sister     Thyroid disease Sister        Social History     Tobacco Use    Smoking status: Former     Packs/day: 0.25     Years: 4.00     Additional pack years: 0.00     Total pack years: 1.00     Types: Cigarettes     Start date: 1963     Quit date: 1967     Years since quittin.0     Passive exposure: Past    Smokeless tobacco: Never    Tobacco comments:     2 to 4 cigarettes a day.   Vaping Use    Vaping Use: Never used   Substance Use Topics    Alcohol use: Yes     Comment: Maybe a 2-4 ounce glass of wine 1 month.    Drug use: Never        Medications Prior to Admission   Medication Sig Dispense Refill Last Dose    aspirin 81 MG tablet 1 tablet.       bisacodyl (DULCOLAX) 5 MG EC tablet Take 1 tablet by mouth Daily As Needed for Constipation.       calcium (OS-ARMANDO) 600 MG tablet TAKE ONE TABLET BY MOUTH EVERY DAY 60 tablet 10     cetirizine (zyrTEC) 10 MG tablet ZYRTEC ALLERGY 10 MG TABS       coenzyme Q10 100 MG capsule Take 1 capsule by mouth Daily.       diclofenac (VOLTAREN) 75 MG EC tablet Take 1 tablet by mouth 2 (Two) Times a Day With Meals. 180 tablet 2     fluticasone (FLONASE) 50 MCG/ACT nasal spray 2 sprays into the nostril(s) as directed by provider Daily. 18 mL 11     Garlic 1000 MG capsule Take 1 capsule by mouth Daily.    "    liothyronine (CYTOMEL) 5 MCG tablet TAKE 2 TABLETS DAILY 180 tablet 1     Misc Natural Products (Neuriva) capsule Take  by mouth.       Multiple Vitamin (MULTIVITAMIN) capsule MULTIVITAMINS CAPS       nitrofurantoin, macrocrystal-monohydrate, (Macrobid) 100 MG capsule Take 1 capsule by mouth 2 (Two) Times a Day for 10 days. 20 capsule 0     omeprazole (priLOSEC) 40 MG capsule TAKE 1 CAPSULE DAILY 90 capsule 1     Polyethylene Glycol 3350 (MIRALAX PO) Take 1 each by mouth 4 (Four) Times a Week.       potassium chloride 10 MEQ CR tablet Take 1 tablet by mouth Daily. 90 tablet 2     promethazine (PHENERGAN) 25 MG tablet Take 1 tablet by mouth Every 8 (Eight) Hours As Needed for Nausea or Vomiting. 30 tablet 0     Synthroid 50 MCG tablet TAKE 1 TABLET DAILY 90 tablet 1          Atorvastatin calcium, Pravastatin sodium, Simvastatin, and Sulfamethoxazole-trimethoprim    Scheduled Meds:aspirin, 81 mg, Oral, Daily  cetirizine, 10 mg, Oral, Daily  enoxaparin, 1 mg/kg, Subcutaneous, Q12H  levothyroxine, 50 mcg, Oral, Q AM  liothyronine, 10 mcg, Oral, Daily  pantoprazole, 40 mg, Oral, Q AM  senna-docusate sodium, 2 tablet, Oral, BID      Continuous Infusions:Pharmacy to Dose enoxaparin (LOVENOX),       PRN Meds:.  acetaminophen    senna-docusate sodium **AND** polyethylene glycol **AND** bisacodyl **AND** bisacodyl    melatonin    metoprolol tartrate    ondansetron    Pharmacy to Dose enoxaparin (LOVENOX)    [COMPLETED] Insert Peripheral IV **AND** sodium chloride    Objective     VITAL SIGNS  Vitals:    12/24/23 0116 12/24/23 0143 12/24/23 0146 12/24/23 0244   BP: 124/71 125/71 124/73 139/77   BP Location:    Right arm   Patient Position:    Lying   Pulse: 82 90 90 88   Resp:    18   Temp:    97.9 °F (36.6 °C)   TempSrc:    Oral   SpO2: 98% 98% 96% 98%   Weight:    56 kg (123 lb 7.3 oz)   Height:           Flowsheet Rows      Flowsheet Row First Filed Value   Admission Height 170.2 cm (67\") Documented at 12/23/2023 2110 "   Admission Weight 59 kg (130 lb) Documented at 12/23/2023 2110            No intake or output data in the 24 hours ending 12/24/23 0737     TELEMETRY:    Physical Exam:  The patient is alert, oriented and in no distress.  Vital signs as noted above.  Head and neck revealed no carotid bruits or jugular venous distention.  No thyromegaly or lymphadenopathy is present  Lungs clear.  No wheezing.  Breath sounds are normal bilaterally.  Heart normal first and second heart sounds. No murmur.  No precordial rub is present.  No gallop is present.  Abdomen soft and nontender.  No organomegaly is present.  Extremities with good peripheral pulses without any pedal edema.  Skin warm and dry.  Musculoskeletal system is grossly normal  CNS grossly normal    Reviewed and updated.    Results Review:   I reviewed the patient's new clinical results.  Lab Results (last 24 hours)       Procedure Component Value Units Date/Time    Basic Metabolic Panel [[id number]]  (Abnormal) Collected: 12/24/23 0631    Specimen: Blood Updated: 12/24/23 0720     Glucose 83 mg/dL      BUN 11 mg/dL      Creatinine 0.53 mg/dL      Sodium 141 mmol/L      Potassium 3.8 mmol/L      Chloride 104 mmol/L      CO2 28.0 mmol/L      Calcium 8.9 mg/dL      BUN/Creatinine Ratio 20.8     Anion Gap 9.0 mmol/L      eGFR 94.2 mL/min/1.73     Narrative:      GFR Normal >60  Chronic Kidney Disease <60  Kidney Failure <15    The GFR formula is only valid for adults with stable renal function between ages 18 and 70.    High Sensitivity Troponin T [[id number]]  (Abnormal) Collected: 12/24/23 0631    Specimen: Blood Updated: 12/24/23 0720     HS Troponin T 18 ng/L     Narrative:      High Sensitive Troponin T Reference Range:  <14.0 ng/L- Negative Female for AMI  <22.0 ng/L- Negative Male for AMI  >=14 - Abnormal Female indicating possible myocardial injury.  >=22 - Abnormal Male indicating possible myocardial injury.   Clinicians would have to utilize clinical acumen, EKG,  Troponin, and serial changes to determine if it is an Acute Myocardial Infarction or myocardial injury due to an underlying chronic condition.         CBC Auto Differential [[id number]]  (Abnormal) Collected: 12/24/23 0631    Specimen: Blood Updated: 12/24/23 0701     WBC 6.90 10*3/mm3      RBC 3.83 10*6/mm3      Hemoglobin 12.1 g/dL      Comment: Result checked          Hematocrit 35.6 %      MCV 92.8 fL      MCH 31.4 pg      MCHC 33.9 g/dL      RDW 12.6 %      RDW-SD 40.7 fl      MPV 8.9 fL      Platelets 294 10*3/mm3      Neutrophil % 71.5 %      Lymphocyte % 20.3 %      Monocyte % 7.7 %      Eosinophil % 0.1 %      Basophil % 0.4 %      Neutrophils, Absolute 4.90 10*3/mm3      Lymphocytes, Absolute 1.40 10*3/mm3      Monocytes, Absolute 0.50 10*3/mm3      Eosinophils, Absolute 0.00 10*3/mm3      Basophils, Absolute 0.00 10*3/mm3      nRBC 0.1 /100 WBC     TSH [[id number]]  (Normal) Collected: 12/23/23 2153    Specimen: Blood Updated: 12/24/23 0100     TSH 0.525 uIU/mL     Magnesium [[id number]]  (Normal) Collected: 12/23/23 2153    Specimen: Blood Updated: 12/24/23 0047     Magnesium 1.7 mg/dL     [place] Draw [[id number]] Collected: 12/23/23 2153    Specimen: Blood Updated: 12/23/23 2301    Narrative:      The following orders were created for panel order [place] [facility].  Procedure                               Abnormality         Status                     ---------                               -----------         ------                     Green Top (Gel)[[id number]]                                  Final result               Lavender Top[[id number]]                                                                Gold Top - SST[[id number]]                                   Final result               Light Blue Top[[id number]]                                   Final result                 Please view results for these tests on the individual orders.    Green Top (Gel) [[id number]] Collected: 12/23/23 2153    Specimen:  Blood Updated: 12/23/23 2301     Extra Tube Hold for add-ons.     Comment: Auto resulted.       Gold Top - SST [[id number]] Collected: 12/23/23 2153    Specimen: Blood Updated: 12/23/23 2301     Extra Tube Hold for add-ons.     Comment: Auto resulted.       Light Blue Top [[id number]] Collected: 12/23/23 2153    Specimen: Blood Updated: 12/23/23 2301     Extra Tube Hold for add-ons.     Comment: Auto resulted       Urinalysis, Microscopic Only - Urine, Clean Catch [[id number]]  (Abnormal) Collected: 12/23/23 2243    Specimen: Urine, Clean Catch Updated: 12/23/23 2255     RBC, UA 3-5 /HPF      WBC, UA 3-5 /HPF      Comment: Urine culture not indicated.        Bacteria, UA None Seen /HPF      Squamous Epithelial Cells, UA 3-6 /HPF      Hyaline Casts, UA 3-6 /LPF      Methodology Automated Microscopy    Urinalysis With Culture If Indicated - Urine, Clean Catch [[id number]]  (Abnormal) Collected: 12/23/23 2243    Specimen: Urine, Clean Catch Updated: 12/23/23 2249     Color, UA Yellow     Appearance, UA Cloudy     pH, UA 7.0     Specific Gravity, UA 1.016     Glucose, UA Negative     Ketones, UA 80 mg/dL (3+)     Bilirubin, UA Negative     Blood, UA Trace     Protein, UA 30 mg/dL (1+)     Leuk Esterase, UA Negative     Nitrite, UA Negative     Urobilinogen, UA 0.2 E.U./dL    Narrative:      In absence of clinical symptoms, the presence of pyuria, bacteria, and/or nitrites on the urinalysis result does not correlate with infection.    COVID-19 and FLU A/B PCR, 1 HR TAT - Swab, Nasopharynx [[id number]]  (Normal) Collected: 12/23/23 2216    Specimen: Swab from Nasopharynx Updated: 12/23/23 2241     COVID19 Not Detected     Influenza A PCR Not Detected     Influenza B PCR Not Detected    Narrative:      Fact sheet for providers: https://www.fda.gov/media/119536/download    Fact sheet for patients: https://www.fda.gov/media/996555/download    Test performed by PCR.    Comprehensive Metabolic Panel [[id number]]  (Abnormal)  Collected: 12/23/23 2153    Specimen: Blood Updated: 12/23/23 2226     Glucose 131 mg/dL      BUN 14 mg/dL      Creatinine 0.63 mg/dL      Sodium 136 mmol/L      Potassium 4.0 mmol/L      Chloride 98 mmol/L      CO2 25.0 mmol/L      Calcium 9.5 mg/dL      Total Protein 7.4 g/dL      Albumin 4.6 g/dL      ALT (SGPT) 26 U/L      AST (SGOT) 28 U/L      Alkaline Phosphatase 72 U/L      Total Bilirubin 0.6 mg/dL      Globulin 2.8 gm/dL      A/G Ratio 1.6 g/dL      BUN/Creatinine Ratio 22.2     Anion Gap 13.0 mmol/L      eGFR 90.4 mL/min/1.73     Narrative:      GFR Normal >60  Chronic Kidney Disease <60  Kidney Failure <15    The GFR formula is only valid for adults with stable renal function between ages 18 and 70.    Lipase [[id number]]  (Normal) Collected: 12/23/23 2153    Specimen: Blood Updated: 12/23/23 2226     Lipase 19 U/L     CBC & Differential [[id number]]  (Abnormal) Collected: 12/23/23 2153    Specimen: Blood Updated: 12/23/23 2208    Narrative:      The following orders were created for panel order CBC & Differential.  Procedure                               Abnormality         Status                     ---------                               -----------         ------                     CBC Auto Differential[[id number]]        Abnormal            Final result                 Please view results for these tests on the individual orders.    CBC Auto Differential [[id number]]  (Abnormal) Collected: 12/23/23 2153    Specimen: Blood Updated: 12/23/23 2208     WBC 8.00 10*3/mm3      RBC 4.73 10*6/mm3      Hemoglobin 14.2 g/dL      Hematocrit 43.6 %      MCV 92.3 fL      MCH 30.1 pg      MCHC 32.6 g/dL      RDW 12.9 %      RDW-SD 42.0 fl      MPV 9.0 fL      Platelets 361 10*3/mm3      Neutrophil % 90.8 %      Lymphocyte % 7.3 %      Monocyte % 1.3 %      Eosinophil % 0.0 %      Basophil % 0.6 %      Neutrophils, Absolute 7.30 10*3/mm3      Lymphocytes, Absolute 0.60 10*3/mm3      Monocytes, Absolute 0.10 10*3/mm3       Eosinophils, Absolute 0.00 10*3/mm3      Basophils, Absolute 0.00 10*3/mm3      nRBC 0.0 /100 WBC             Imaging Results (Last 24 Hours)       ** No results found for the last 24 hours. **        LAB RESULTS (LAST 7 DAYS)    CBC  Results from last 7 days   Lab Units 12/24/23  0631 12/23/23 2153   WBC 10*3/mm3 6.90 8.00   RBC 10*6/mm3 3.83 4.73   HEMOGLOBIN g/dL 12.1 14.2   HEMATOCRIT % 35.6 43.6   MCV fL 92.8 92.3   PLATELETS 10*3/mm3 294 361       BMP  Results from last 7 days   Lab Units 12/24/23  0631 12/23/23  2153   SODIUM mmol/L 141 136   POTASSIUM mmol/L 3.8 4.0   CHLORIDE mmol/L 104 98   CO2 mmol/L 28.0 25.0   BUN mg/dL 11 14   CREATININE mg/dL 0.53* 0.63   GLUCOSE mg/dL 83 131*   MAGNESIUM mg/dL  --  1.7       CMP   Results from last 7 days   Lab Units 12/24/23  0631 12/23/23 2153   SODIUM mmol/L 141 136   POTASSIUM mmol/L 3.8 4.0   CHLORIDE mmol/L 104 98   CO2 mmol/L 28.0 25.0   BUN mg/dL 11 14   CREATININE mg/dL 0.53* 0.63   GLUCOSE mg/dL 83 131*   ALBUMIN g/dL  --  4.6   BILIRUBIN mg/dL  --  0.6   ALK PHOS U/L  --  72   AST (SGOT) U/L  --  28   ALT (SGPT) U/L  --  26   LIPASE U/L  --  19         BNP        TROPONIN  Results from last 7 days   Lab Units 12/24/23 0631   HSTROP T ng/L 18*       CoAg        Creatinine Clearance  Estimated Creatinine Clearance: 76.1 mL/min (A) (by C-G formula based on SCr of 0.53 mg/dL (L)).    ABG        Radiology  No radiology results for the last day      EKG            I personally viewed and interpreted the patient's EKG/Telemetry data: Atrial fibrillation    ECHOCARDIOGRAM:        STRESS TEST        Cardiolite (Tc-99m sestamibi) stress test    HEART CATHETERIZATION  No results found for this or any previous visit.      OTHER:     Assessment & Plan     Principal Problem:    New onset a-fib    ]]]]]]]]]]]]]]]]]]]]]]]  Impression  =============  -Recent nausea vomiting.    - Atrial fibrillation-new onset.    - History of COVID    -Dyslipidemia hypothyroidism  pantoprazole    - Allergic to atorvastatin pravastatin simvastatin sulfamethizole trimethoprim.    - Non-smoker.  ============  Plan  =============    Patient presented with new onset atrial fibrillation.  Patient was given intravenous Cardizem with rate control but still in atrial fibrillation.  [id number] score is 3    Attempt to convert her to sinus rhythm.    Treat GI symptoms-primary care.  Treated with Zofran and IV fluids    Echocardiogram  Stress Cardiolite test  Normal-12/24/2023    Consultation for long-term anticoagulation to minimize risk of cardioembolic problems.    Medications were reviewed and updated.  Patient is on aspirin the patient is alert, oriented and in no distress.    Start p.o. Cardizem.    Consideration for cardioversion as an outpatient if patient has persistent atrial fibrillation after adequate anticoagulation.    Further plan will depend on patient's progress.    ]]]]]]]]]]]]]]]]]]]]    Brennan Andrews MD  12/24/23  07:37 EST

## 2023-12-24 NOTE — DISCHARGE SUMMARY
[facility]    Maile Osorio MD    CHIEF COMPLAINT:     Nausea and vomiting     HISTORY OF PRESENT ILLNESS:    Obtained from ED provider HPI on 12/23/2023:  Patient is a 78-year-old  female with history of paroxysmal A-fib not on anticoagulation, hyperlipidemia, GERD presents to the ER with complaints of nausea vomiting since 5 AM this morning.  Patient states that she has felt nauseous all day.  She has had 15+ episodes of vomiting.  No hematemesis.  She also reports a few episodes of diarrhea but no hematochezia or melena.  She has no localized pain.  No chest pain shortness of breath.  She does feel lightheaded because she states she has not been able to keep anything down.  No lower extremity swelling.  No fever.  Patient reports that her  threw up yesterday but has not thrown up today.  They did not eat the same food.  No recent sick contacts that she is aware of.    12/24/23:  Patient confirms the HPI noted above including some nausea and vomiting associated with a headache which she is head for approximately the past 24 to 36 hours.  She notes that in general these symptoms are not unusual and that she will have a headache with some GI symptoms 3-4 times per year.  She does note however that her  recently had some nausea and vomiting and GI complaints are unusual for him.  Patient also notes that she had multiple bowel movements the previous day though she reports that these were all formed stool.  No sensation of palpitations, lightheadedness, pain other than her headache, dyspnea or peripheral edema is noted.  Patient did take p.o. as well as Phenergan suppositories which she reports were 5 years old with minimal improvement in symptoms.  A previous history of A-fib was noted however she has continued to follow with cardiology with no recurrence reported and reports that her primary cardiologist recently released her from his care given that her  clinical course has been unremarkable.  Following her treatment in the ED her GI symptoms have resolved            Past Medical History:   Diagnosis Date    Actinic keratosis     Atrial fibrillation     Basal cell adenocarcinoma     Bell's palsy     Left    Chronic constipation     DDD (degenerative disc disease), cervical     DDD (degenerative disc disease), cervical     Edema     GERD (gastroesophageal reflux disease)     Headache     Hyperlipidemia     Hyperthyroidism 15/20 years ago    Hashimoto.    Hypothyroidism     Migraine headache     Mild carotid artery disease     Osteoarthritis     Statin-induced myositis     Urinary tract infection Have several a year     Past Surgical History:   Procedure Laterality Date    BASAL CELL CARCINOMA EXCISION      nose    BUNIONECTOMY Right     COLONOSCOPY      2016- due 5 years    HYSTERECTOMY      TUBAL ABDOMINAL LIGATION  ?? 1977     Family History   Problem Relation Age of Onset    Thyroid disease Mother         100 years bold    Diabetes Father         Diabetic    Hyperlipidemia Father         High blood pressure    Cancer Sister         Colon cancer    Hyperlipidemia Sister     Thyroid disease Sister     Diabetes Brother         Diabetic    Cancer Brother         Bladder cancer    Cancer Brother         Multiple Myeloma    Cancer Brother         Lung Cancer    Diabetes Brother         Diabetic.    Hyperlipidemia Brother     Hyperlipidemia Brother     Hyperlipidemia Brother     Kidney disease Sister     Thyroid disease Sister      Social History     Tobacco Use    Smoking status: Former     Packs/day: 0.25     Years: 4.00     Additional pack years: 0.00     Total pack years: 1.00     Types: Cigarettes     Start date: 1963     Quit date: 1967     Years since quittin.0     Passive exposure: Past    Smokeless tobacco: Never    Tobacco comments:     2 to 4 cigarettes a day.   Vaping Use    Vaping Use: Never used   Substance Use Topics    Alcohol use: Yes      Comment: Maybe a 2-4 ounce glass of wine 1 month.    Drug use: Never     Medications Prior to Admission   Medication Sig Dispense Refill Last Dose    aspirin 81 MG tablet 1 tablet.       bisacodyl (DULCOLAX) 5 MG EC tablet Take 1 tablet by mouth Daily As Needed for Constipation.       calcium (OS-ARMANDO) 600 MG tablet TAKE ONE TABLET BY MOUTH EVERY DAY 60 tablet 10     cetirizine (zyrTEC) 10 MG tablet ZYRTEC ALLERGY 10 MG TABS       coenzyme Q10 100 MG capsule Take 1 capsule by mouth Daily.       diclofenac (VOLTAREN) 75 MG EC tablet Take 1 tablet by mouth 2 (Two) Times a Day With Meals. 180 tablet 2     fluticasone (FLONASE) 50 MCG/ACT nasal spray 2 sprays into the nostril(s) as directed by provider Daily. 18 mL 11     Garlic 1000 MG capsule Take 1 capsule by mouth Daily.       liothyronine (CYTOMEL) 5 MCG tablet TAKE 2 TABLETS DAILY 180 tablet 1     Misc Natural Products (Neuriva) capsule Take  by mouth.       Multiple Vitamin (MULTIVITAMIN) capsule MULTIVITAMINS CAPS       [] nitrofurantoin, macrocrystal-monohydrate, (Macrobid) 100 MG capsule Take 1 capsule by mouth 2 (Two) Times a Day for 10 days. 20 capsule 0     omeprazole (priLOSEC) 40 MG capsule TAKE 1 CAPSULE DAILY 90 capsule 1     Polyethylene Glycol 3350 (MIRALAX PO) Take 1 each by mouth 4 (Four) Times a Week.       potassium chloride 10 MEQ CR tablet Take 1 tablet by mouth Daily. 90 tablet 2     promethazine (PHENERGAN) 25 MG tablet Take 1 tablet by mouth Every 8 (Eight) Hours As Needed for Nausea or Vomiting. 30 tablet 0     Synthroid 50 MCG tablet TAKE 1 TABLET DAILY 90 tablet 1      Allergies:  Atorvastatin calcium, Pravastatin sodium, Simvastatin, and Sulfamethoxazole-trimethoprim    Immunization History   Administered Date(s) Administered    COVID-19 (MODERNA) 1st,2nd,3rd Dose Monovalent 2021, 2021    COVID-19 (MODERNA) Monovalent Original Booster 2021    Fluzone High Dose =>65 Years ([employer] ONLY) 10/12/2017    Pneumococcal  Conjugate 13-Valent (PCV13) 01/01/2015, 02/17/2016    Pneumococcal Polysaccharide (PPSV23) 06/12/2012    Zostavax 01/01/2014, 10/03/2014           REVIEW OF SYSTEMS:    Review of Systems   Constitutional: Negative.   HENT: Negative.     Eyes: Negative.    Cardiovascular: Negative.    Respiratory: Negative.     Skin: Negative.    Musculoskeletal: Negative.    Gastrointestinal:  Positive for abdominal pain, nausea and vomiting. Negative for hematemesis, hematochezia and melena.   Genitourinary: Negative.    Neurological:  Positive for headaches.   Psychiatric/Behavioral: Negative.           Vital Signs  Temp:  [97.5 °F (36.4 °C)-98 °F (36.7 °C)] 97.7 °F (36.5 °C)  Heart Rate:  [70-80] 76  Resp:  [14-18] 16  BP: (103-135)/(53-78) 135/78          Physical Exam:  Physical Exam  Constitutional:       General: She is not in acute distress.     Appearance: Normal appearance. She is normal weight. She is not ill-appearing, toxic-appearing or diaphoretic.   HENT:      Head: Normocephalic.      Right Ear: External ear normal.      Left Ear: External ear normal.      Nose: Nose normal.      Mouth/Throat:      Mouth: Mucous membranes are moist.   Eyes:      Extraocular Movements: Extraocular movements intact.   Cardiovascular:      Rate and Rhythm: Normal rate and regular rhythm.      Pulses: Normal pulses.   Pulmonary:      Effort: Pulmonary effort is normal.      Breath sounds: Normal breath sounds.   Abdominal:      General: Bowel sounds are normal.      Palpations: Abdomen is soft.   Musculoskeletal:         General: Normal range of motion.      Cervical back: Normal range of motion.      Right lower leg: No edema.      Left lower leg: No edema.   Skin:     General: Skin is warm and dry.      Capillary Refill: Capillary refill takes less than 2 seconds.   Neurological:      General: No focal deficit present.      Mental Status: She is alert and oriented to person, place, and time.   Psychiatric:         Mood and Affect: Mood  normal.         Behavior: Behavior normal.         Thought Content: Thought content normal.         Judgment: Judgment normal.           Emotional Behavior:   Normal   Debilities:  None  Results Review:    I reviewed the patient's new clinical results.  Lab Results (most recent)       Procedure Component Value Units Date/Time    High Sensitivity Troponin T 2Hr [[id number]]  (Abnormal) Collected: 12/24/23 0914    Specimen: Blood from Arm, Left Updated: 12/24/23 0958     HS Troponin T 25 ng/L      Troponin T Delta 7 ng/L     Narrative:      High Sensitive Troponin T Reference Range:  <14.0 ng/L- Negative Female for AMI  <22.0 ng/L- Negative Male for AMI  >=14 - Abnormal Female indicating possible myocardial injury.  >=22 - Abnormal Male indicating possible myocardial injury.   Clinicians would have to utilize clinical acumen, EKG, Troponin, and serial changes to determine if it is an Acute Myocardial Infarction or myocardial injury due to an underlying chronic condition.         Basic Metabolic Panel [[id number]]  (Abnormal) Collected: 12/24/23 0631    Specimen: Blood Updated: 12/24/23 0720     Glucose 83 mg/dL      BUN 11 mg/dL      Creatinine 0.53 mg/dL      Sodium 141 mmol/L      Potassium 3.8 mmol/L      Chloride 104 mmol/L      CO2 28.0 mmol/L      Calcium 8.9 mg/dL      BUN/Creatinine Ratio 20.8     Anion Gap 9.0 mmol/L      eGFR 94.2 mL/min/1.73     Narrative:      GFR Normal >60  Chronic Kidney Disease <60  Kidney Failure <15    The GFR formula is only valid for adults with stable renal function between ages 18 and 70.    High Sensitivity Troponin T [[id number]]  (Abnormal) Collected: 12/24/23 0631    Specimen: Blood Updated: 12/24/23 0720     HS Troponin T 18 ng/L     Narrative:      High Sensitive Troponin T Reference Range:  <14.0 ng/L- Negative Female for AMI  <22.0 ng/L- Negative Male for AMI  >=14 - Abnormal Female indicating possible myocardial injury.  >=22 - Abnormal Male indicating possible myocardial  injury.   Clinicians would have to utilize clinical acumen, EKG, Troponin, and serial changes to determine if it is an Acute Myocardial Infarction or myocardial injury due to an underlying chronic condition.         CBC Auto Differential [[id number]]  (Abnormal) Collected: 12/24/23 0631    Specimen: Blood Updated: 12/24/23 0701     WBC 6.90 10*3/mm3      RBC 3.83 10*6/mm3      Hemoglobin 12.1 g/dL      Comment: Result checked          Hematocrit 35.6 %      MCV 92.8 fL      MCH 31.4 pg      MCHC 33.9 g/dL      RDW 12.6 %      RDW-SD 40.7 fl      MPV 8.9 fL      Platelets 294 10*3/mm3      Neutrophil % 71.5 %      Lymphocyte % 20.3 %      Monocyte % 7.7 %      Eosinophil % 0.1 %      Basophil % 0.4 %      Neutrophils, Absolute 4.90 10*3/mm3      Lymphocytes, Absolute 1.40 10*3/mm3      Monocytes, Absolute 0.50 10*3/mm3      Eosinophils, Absolute 0.00 10*3/mm3      Basophils, Absolute 0.00 10*3/mm3      nRBC 0.1 /100 WBC     TSH [[id number]]  (Normal) Collected: 12/23/23 2153    Specimen: Blood Updated: 12/24/23 0100     TSH 0.525 uIU/mL     Magnesium [[id number]]  (Normal) Collected: 12/23/23 2153    Specimen: Blood Updated: 12/24/23 0047     Magnesium 1.7 mg/dL     [place] Draw [[id number]] Collected: 12/23/23 2153    Specimen: Blood Updated: 12/23/23 2301    Narrative:      The following orders were created for panel order [place] [facility].  Procedure                               Abnormality         Status                     ---------                               -----------         ------                     Green Top (Gel)[[id number]]                                  Final result               Lavender Top[[id number]]                                                                Gold Top - SST[[id number]]                                   Final result               Light Blue Top[[id number]]                                   Final result                 Please view results for these tests on the individual orders.     Green Top (Gel) [[id number]] Collected: 12/23/23 2153    Specimen: Blood Updated: 12/23/23 2301     Extra Tube Hold for add-ons.     Comment: Auto resulted.       Gold Top - SST [[id number]] Collected: 12/23/23 2153    Specimen: Blood Updated: 12/23/23 2301     Extra Tube Hold for add-ons.     Comment: Auto resulted.       Light Blue Top [[id number]] Collected: 12/23/23 2153    Specimen: Blood Updated: 12/23/23 2301     Extra Tube Hold for add-ons.     Comment: Auto resulted       Urinalysis, Microscopic Only - Urine, Clean Catch [[id number]]  (Abnormal) Collected: 12/23/23 2243    Specimen: Urine, Clean Catch Updated: 12/23/23 2255     RBC, UA 3-5 /HPF      WBC, UA 3-5 /HPF      Comment: Urine culture not indicated.        Bacteria, UA None Seen /HPF      Squamous Epithelial Cells, UA 3-6 /HPF      Hyaline Casts, UA 3-6 /LPF      Methodology Automated Microscopy    Urinalysis With Culture If Indicated - Urine, Clean Catch [[id number]]  (Abnormal) Collected: 12/23/23 2243    Specimen: Urine, Clean Catch Updated: 12/23/23 2249     Color, UA Yellow     Appearance, UA Cloudy     pH, UA 7.0     Specific Gravity, UA 1.016     Glucose, UA Negative     Ketones, UA 80 mg/dL (3+)     Bilirubin, UA Negative     Blood, UA Trace     Protein, UA 30 mg/dL (1+)     Leuk Esterase, UA Negative     Nitrite, UA Negative     Urobilinogen, UA 0.2 E.U./dL    Narrative:      In absence of clinical symptoms, the presence of pyuria, bacteria, and/or nitrites on the urinalysis result does not correlate with infection.    COVID-19 and FLU A/B PCR, 1 HR TAT - Swab, Nasopharynx [[id number]]  (Normal) Collected: 12/23/23 2216    Specimen: Swab from Nasopharynx Updated: 12/23/23 2241     COVID19 Not Detected     Influenza A PCR Not Detected     Influenza B PCR Not Detected    Narrative:      Fact sheet for providers: https://www.fda.gov/media/993884/download    Fact sheet for patients: https://www.fda.gov/media/851359/download    Test performed by PCR.     Comprehensive Metabolic Panel [[id number]]  (Abnormal) Collected: 12/23/23 2153    Specimen: Blood Updated: 12/23/23 2226     Glucose 131 mg/dL      BUN 14 mg/dL      Creatinine 0.63 mg/dL      Sodium 136 mmol/L      Potassium 4.0 mmol/L      Chloride 98 mmol/L      CO2 25.0 mmol/L      Calcium 9.5 mg/dL      Total Protein 7.4 g/dL      Albumin 4.6 g/dL      ALT (SGPT) 26 U/L      AST (SGOT) 28 U/L      Alkaline Phosphatase 72 U/L      Total Bilirubin 0.6 mg/dL      Globulin 2.8 gm/dL      A/G Ratio 1.6 g/dL      BUN/Creatinine Ratio 22.2     Anion Gap 13.0 mmol/L      eGFR 90.4 mL/min/1.73     Narrative:      GFR Normal >60  Chronic Kidney Disease <60  Kidney Failure <15    The GFR formula is only valid for adults with stable renal function between ages 18 and 70.    Lipase [[id number]]  (Normal) Collected: 12/23/23 2153    Specimen: Blood Updated: 12/23/23 2226     Lipase 19 U/L     CBC & Differential [[id number]]  (Abnormal) Collected: 12/23/23 2153    Specimen: Blood Updated: 12/23/23 2208    Narrative:      The following orders were created for panel order CBC & Differential.  Procedure                               Abnormality         Status                     ---------                               -----------         ------                     CBC Auto Differential[[id number]]        Abnormal            Final result                 Please view results for these tests on the individual orders.    CBC Auto Differential [[id number]]  (Abnormal) Collected: 12/23/23 2153    Specimen: Blood Updated: 12/23/23 2208     WBC 8.00 10*3/mm3      RBC 4.73 10*6/mm3      Hemoglobin 14.2 g/dL      Hematocrit 43.6 %      MCV 92.3 fL      MCH 30.1 pg      MCHC 32.6 g/dL      RDW 12.9 %      RDW-SD 42.0 fl      MPV 9.0 fL      Platelets 361 10*3/mm3      Neutrophil % 90.8 %      Lymphocyte % 7.3 %      Monocyte % 1.3 %      Eosinophil % 0.0 %      Basophil % 0.6 %      Neutrophils, Absolute 7.30 10*3/mm3      Lymphocytes,  Absolute 0.60 10*3/mm3      Monocytes, Absolute 0.10 10*3/mm3      Eosinophils, Absolute 0.00 10*3/mm3      Basophils, Absolute 0.00 10*3/mm3      nRBC 0.0 /100 WBC             Imaging Results (Most Recent)       None          reviewed    ECG/EMG Results (most recent)       Procedure Component Value Units Date/Time    ECG 12 Lead [[id number]] Resulted: 12/24/23 0508     Updated: 12/24/23 0508    ECG 12 Lead Tachycardia [[id number]] Collected: 12/23/23 2208     Updated: 12/24/23 0755     QT Interval 339 ms      QTC Interval 456 ms     Narrative:      HEART RATE= 117  bpm  RR Interval= 552  ms  MA Interval= 167  ms  P Horizontal Axis= -59  deg  P Front Axis= 0  deg  QRSD Interval= 86  ms  QT Interval= 339  ms  QTcB= 456  ms  QRS Axis= -18  deg  T Wave Axis= 73  deg  - BORDERLINE ECG -  Afib with RVR  No previous ECG available for comparison  Electronically Signed By: Mark Ma ([facility]) 24-Dec-2023 07:55:38  Date and Time of Study: 2023-12-23 22:08:02          reviewed            Microbiology Results (last 10 days)       Procedure Component Value - Date/Time    COVID-19 and FLU A/B PCR, 1 HR TAT - Swab, Nasopharynx [[id number]]  (Normal) Collected: 12/23/23 2216    Lab Status: Final result Specimen: Swab from Nasopharynx Updated: 12/23/23 2241     COVID19 Not Detected     Influenza A PCR Not Detected     Influenza B PCR Not Detected    Narrative:      Fact sheet for providers: https://www.fda.gov/media/800159/download    Fact sheet for patients: https://www.fda.gov/media/590091/download    Test performed by PCR.            Assessment & Plan     New onset a-fib       Headache with nausea, vomiting and findings of new onset atrial fibrillation  -Troponin: 18, 25  -BMP and CBC generally unremarkable  -TSH: 0.525  -Lipase: 19  -UA showed no bacteria with 3-5 WBCs, 3-5 RBCs, 1+ protein, trace blood and 3+ ketones with a specific gravity of 1.016  -COVID and flu are negative  -EKG showed A-fib with RVR at a rate of 117 with  PVC.  -Patient given IV Cardizem in the ED along with Zofran and 1 L fluid bolus  -Cardiology consulted  -Stress test no ichemia   -Continue aspirin    Hypothyroidism  -Continue Synthroid and liothyronine    GERD  -PPI        I discussed the patients findings and my recommendations with patient and nursing staff.     Discharge Diagnosis:      New onset a-fib      Hospital Course  Patient is a 78 y.o. female presented with nausea, vomiting and headache and found to have A-fib with RVR with an HPI noted above.  BMP and CBC were generally unremarkable and troponin was slightly elevated and trended up from 18-25.  TSH was within normal limits at 0.525 with a lipase of 19.  UA was obtained and showed WBCs, RBCs, 1+ protein and 3+ ketones with a specific gravity of 1.016.  COVID and flu were assessed and found to be negative.  EKG in the ED showed A-fib with RVR at a rate of 117 with PVC present.  Patient was given 1 L fluid bolus along with Zofran and IV Cardizem in the ED.  Cardiology was consulted who evaluated patient recommending stress testing which was performed on 12/24/2023 showing no ischemia.  Patient to take Eliquis and Cardizem per cardiology.  Patient take medication as prescribed.  Patient to follow-up with cardiology in 2 weeks.  At this time patient is felt to be in good condition for discharge with close follow-up with her PCP as well as cardiology on an outpatient basis.  Her full testing/results and plan were discussed with patient along with concerning/alarm symptoms for which to call 911/return to the ED.  A short course of as needed Phenergan will also be prescribed at discharge.  All questions were answered and she verbalizes her understanding and agreement.    Past Medical History:     Past Medical History:   Diagnosis Date    Actinic keratosis     Atrial fibrillation     Basal cell adenocarcinoma     Bell's palsy     Left    Chronic constipation     DDD (degenerative disc disease), cervical      DDD (degenerative disc disease), cervical     Edema     GERD (gastroesophageal reflux disease) 2015    Headache     Hyperlipidemia     Hyperthyroidism 15/20 years ago    Hashimoto.    Hypothyroidism     Migraine headache     Mild carotid artery disease     Osteoarthritis     Statin-induced myositis     Urinary tract infection Have several a year       Past Surgical History:     Past Surgical History:   Procedure Laterality Date    BASAL CELL CARCINOMA EXCISION      nose    BUNIONECTOMY Right     COLONOSCOPY      2016- due 5 years    HYSTERECTOMY      TUBAL ABDOMINAL LIGATION  ??        Social History:   Social History     Socioeconomic History    Marital status:    Tobacco Use    Smoking status: Former     Packs/day: 0.25     Years: 4.00     Additional pack years: 0.00     Total pack years: 1.00     Types: Cigarettes     Start date: 1963     Quit date: 1967     Years since quittin.0     Passive exposure: Past    Smokeless tobacco: Never    Tobacco comments:     2 to 4 cigarettes a day.   Vaping Use    Vaping Use: Never used   Substance and Sexual Activity    Alcohol use: Yes     Comment: Maybe a 2-4 ounce glass of wine 1 month.    Drug use: Never    Sexual activity: Yes     Partners: Male     Birth control/protection: Post-menopausal, Hysterectomy, Same-sex partner       Procedures Performed         Consults:   Consults       Date and Time Order Name Status Description    2023  4:46 AM Inpatient Cardiology Consult              Condition on Discharge:     Stable    Discharge Disposition  Home or Self Care    Discharge Medications     Discharge Medications        New Medications        Instructions Start Date   apixaban 5 MG tablet tablet  Commonly known as: ELIQUIS   5 mg, Oral, Every 12 Hours      dilTIAZem  MG 24 hr capsule  Commonly known as: CARDIZEM CD   240 mg, Oral, Every 24 Hours Scheduled      ondansetron 4 MG tablet  Commonly known as: Zofran   4 mg, Oral, Every 8 Hours  PRN             Continue These Medications        Instructions Start Date   aspirin 81 MG tablet   81 mg      bisacodyl 5 MG EC tablet  Commonly known as: DULCOLAX   5 mg, Oral, Daily PRN      calcium 600 MG tablet  Commonly known as: OS-ARMANDO   TAKE ONE TABLET BY MOUTH EVERY DAY      cetirizine 10 MG tablet  Commonly known as: zyrTEC   ZYRTEC ALLERGY 10 MG TABS      coenzyme Q10 100 MG capsule   100 mg, Oral, Daily      fluticasone 50 MCG/ACT nasal spray  Commonly known as: FLONASE   2 sprays, Nasal, Daily      Garlic 1000 MG capsule   1 capsule, Oral, Daily      liothyronine 5 MCG tablet  Commonly known as: CYTOMEL   TAKE 2 TABLETS DAILY      MIRALAX PO   1 each, Oral, 4 Times Weekly      multivitamin capsule   MULTIVITAMINS CAPS      Neuriva capsule   Oral      omeprazole 40 MG capsule  Commonly known as: priLOSEC   40 mg, Oral, Daily      potassium chloride 10 MEQ CR tablet   10 mEq, Oral, Daily      promethazine 25 MG tablet  Commonly known as: PHENERGAN   25 mg, Oral, Every 8 Hours PRN      Synthroid 50 MCG tablet  Generic drug: levothyroxine   50 mcg, Oral, Daily             Stop These Medications      diclofenac 75 MG EC tablet  Commonly known as: VOLTAREN            ASK your doctor about these medications        Instructions Start Date   nitrofurantoin (macrocrystal-monohydrate) 100 MG capsule  Commonly known as: Macrobid  Ask about: Should I take this medication?   100 mg, Oral, 2 Times Daily               Discharge Diet:   Diet Instructions       Diet: Cardiac Diets; Healthy Heart (2-3 Na+); Regular Texture (IDDSI 7); Thin (IDDSI 0)      Discharge Diet: Cardiac Diets    Cardiac Diet: Healthy Heart (2-3 Na+)    Texture: Regular Texture (IDDSI 7)    Fluid Consistency: Thin (IDDSI 0)            Activity at Discharge:   Activity Instructions       Activity as Tolerated      Measure Blood Pressure              Follow-up Appointments  Future Appointments   Date Time Provider Department Center   12/3/2024  9:00 AM  [facility]ile [facility]     Additional Instructions for the Follow-ups that You Need to Schedule       Discharge Follow-up with PCP   As directed       Currently Documented PCP:    Maile Osorio MD    PCP Phone Number:    [phone]     Follow Up Details: 7-10 days                Test Results Pending at Discharge       Risk for Readmission (LACE) Score: 2 (12/25/2023  6:00 AM)      Greater than 30 minutes spent in discharge activities for this patient    ISAURA De Guzman  12/25/23  07:23 EST

## 2023-12-24 NOTE — H&P
[facility] Observation Unit H&P    Patient Name: Helen Rousseau  : 1945  MRN: [id number]  Primary Care Physician: Maile Osorio MD  Date of admission: 2023     Patient Care Team:  Maile Osorio MD as PCP - General (Family Medicine)          Subjective   History Present Illness     Chief Complaint:   Chief Complaint   Patient presents with    Vomiting         History of Present Illness  Obtained from ED provider HPI on 2023:  Patient is a 78-year-old  female with history of paroxysmal A-fib not on anticoagulation, hyperlipidemia, GERD presents to the ER with complaints of nausea vomiting since 5 AM this morning.  Patient states that she has felt nauseous all day.  She has had 15+ episodes of vomiting.  No hematemesis.  She also reports a few episodes of diarrhea but no hematochezia or melena.  She has no localized pain.  No chest pain shortness of breath.  She does feel lightheaded because she states she has not been able to keep anything down.  No lower extremity swelling.  No fever.  Patient reports that her  threw up yesterday but has not thrown up today.  They did not eat the same food.  No recent sick contacts that she is aware of.     23:  Patient confirms the HPI noted above including some nausea and vomiting associated with a headache which she is head for approximately the past 24 to 36 hours.  She notes that in general these symptoms are not unusual and that she will have a headache with some GI symptoms 3-4 times per year.  She does note however that her  recently had some nausea and vomiting and GI complaints are unusual for him.  Patient also notes that she had multiple bowel movements the previous day though she reports that these were all formed stool.  No sensation of palpitations, lightheadedness, pain other than her headache, dyspnea or peripheral edema is noted.  Patient did take p.o. as well as Phenergan suppositories which she  reports were 5 years old with minimal improvement in symptoms.  A previous history of A-fib was noted however she has continued to follow with cardiology with no recurrence reported and reports that her primary cardiologist recently released her from his care given that her clinical course has been unremarkable.  Following her treatment in the ED her GI symptoms have resolved        ROS          Personal History     Past Medical History:   Past Medical History:   Diagnosis Date    Actinic keratosis     Atrial fibrillation     Basal cell adenocarcinoma     Bell's palsy     Left    Chronic constipation     DDD (degenerative disc disease), cervical     DDD (degenerative disc disease), cervical     Edema     GERD (gastroesophageal reflux disease) 2015    Headache     Hyperlipidemia     Hyperthyroidism 15/20 years ago    Hashimoto.    Hypothyroidism     Migraine headache     Mild carotid artery disease     Osteoarthritis     Statin-induced myositis     Urinary tract infection Have several a year       Surgical History:      Past Surgical History:   Procedure Laterality Date    BASAL CELL CARCINOMA EXCISION      nose    BUNIONECTOMY Right     COLONOSCOPY      2016- due 5 years    HYSTERECTOMY      TUBAL ABDOMINAL LIGATION  ?? 1977           Family History: family history includes Cancer in her brother, brother, brother, and sister; Diabetes in her brother, brother, and father; Hyperlipidemia in her brother, brother, brother, father, and sister; Kidney disease in her sister; Thyroid disease in her mother, sister, and sister. Otherwise pertinent FHx was reviewed and unremarkable.     Social History:  reports that she quit smoking about 57 years ago. Her smoking use included cigarettes. She started smoking about 61 years ago. She has a 1.00 pack-year smoking history. She has been exposed to tobacco smoke. She has never used smokeless tobacco. She reports current alcohol use. She reports that she does not use  drugs.      Medications:  Prior to Admission medications    Medication Sig Start Date End Date Taking? Authorizing Provider   aspirin 81 MG tablet 1 tablet. 1/25/20   Royal Rodgers MD   bisacodyl (DULCOLAX) 5 MG EC tablet Take 1 tablet by mouth Daily As Needed for Constipation.    Royal Rodgers MD   calcium (OS-ARMANDO) 600 MG tablet TAKE ONE TABLET BY MOUTH EVERY DAY 11/3/20   Markell Soliman MD   cetirizine (zyrTEC) 10 MG tablet ZYRTEC ALLERGY 10 MG TABS 12/4/15   Royal Rodgers MD   coenzyme Q10 100 MG capsule Take 1 capsule by mouth Daily.    Royal Rodgers MD   diclofenac (VOLTAREN) 75 MG EC tablet Take 1 tablet by mouth 2 (Two) Times a Day With Meals. 12/7/23   Maile Osorio MD   fluticasone (FLONASE) 50 MCG/ACT nasal spray 2 sprays into the nostril(s) as directed by provider Daily. 12/12/23   Maile Osorio MD   Garlic 1000 MG capsule Take 1 capsule by mouth Daily.    Royal Rodgers MD   liothyronine (CYTOMEL) 5 MCG tablet TAKE 2 TABLETS DAILY 11/6/23   Maile Osorio MD   Misc Natural Products (Neuriva) capsule Take  by mouth.    Royal Rodgers MD   Multiple Vitamin (MULTIVITAMIN) capsule MULTIVITAMINS CAPS 3/9/15   Royal Rodgers MD   nitrofurantoin, macrocrystal-monohydrate, (Macrobid) 100 MG capsule Take 1 capsule by mouth 2 (Two) Times a Day for 10 days. 12/14/23 12/24/23  Maile Osorio MD   omeprazole (priLOSEC) 40 MG capsule TAKE 1 CAPSULE DAILY 11/20/23   Maile Osorio MD   Polyethylene Glycol 3350 (MIRALAX PO) Take 1 each by mouth 4 (Four) Times a Week.    Royal Rodgers MD   potassium chloride 10 MEQ CR tablet Take 1 tablet by mouth Daily. 12/7/23   Maile Osorio MD   promethazine (PHENERGAN) 25 MG tablet Take 1 tablet by mouth Every 8 (Eight) Hours As Needed for Nausea or Vomiting. 12/12/23   Maile Osorio MD   Synthroid 50 MCG tablet TAKE 1 TABLET DAILY  11/6/23   Maile Osorio MD       Allergies:    Allergies   Allergen Reactions    Atorvastatin Calcium Myalgia    Pravastatin Sodium Myalgia    Simvastatin Myalgia    Sulfamethoxazole-Trimethoprim Rash       Objective   Objective     Vital Signs  Temp:  [97.9 °F (36.6 °C)-98.3 °F (36.8 °C)] 98 °F (36.7 °C)  Heart Rate:  [] 73  Resp:  [16-18] 16  BP: (107-158)/(53-93) 117/53  SpO2:  [95 %-99 %] 99 %  on   ;   Device (Oxygen Therapy): room air  Body mass index is 19.34 kg/m².    Physical Exam  Review of Systems   Constitutional: Negative.   HENT: Negative.     Eyes: Negative.    Cardiovascular: Negative.    Respiratory: Negative.     Skin: Negative.    Musculoskeletal: Negative.    Gastrointestinal:  Positive for abdominal pain, nausea and vomiting. Negative for hematemesis, hematochezia and melena.   Genitourinary: Negative.    Neurological:  Positive for headaches.   Psychiatric/Behavioral: Negative.      Results Review:  I have personally reviewed most recent cardiac tracings, lab results, and radiology images and interpretations and agree with findings, most notably: Troponin, proBNP, CBC, CMP, TSH, lipase, UA, COVID and flu testing, EKG and stress test.    Results from last 7 days   Lab Units 12/24/23  0631   WBC 10*3/mm3 6.90   HEMOGLOBIN g/dL 12.1   HEMATOCRIT % 35.6   PLATELETS 10*3/mm3 294     Results from last 7 days   Lab Units 12/24/23  0914 12/24/23  0631 12/23/23  2153   SODIUM mmol/L  --  141 136   POTASSIUM mmol/L  --  3.8 4.0   CHLORIDE mmol/L  --  104 98   CO2 mmol/L  --  28.0 25.0   BUN mg/dL  --  11 14   CREATININE mg/dL  --  0.53* 0.63   GLUCOSE mg/dL  --  83 131*   CALCIUM mg/dL  --  8.9 9.5   ALK PHOS U/L  --   --  72   ALT (SGPT) U/L  --   --  26   AST (SGOT) U/L  --   --  28   HSTROP T ng/L 25* 18*  --      Estimated Creatinine Clearance: 76.1 mL/min (A) (by C-G formula based on SCr of 0.53 mg/dL (L)).  Brief Urine Lab Results  (Last result in the past 365 days)        Color    Clarity   Blood   Leuk Est   Nitrite   Protein   CREAT   Urine HCG        12/23/23 2243 Yellow   Cloudy   Trace   Negative   Negative   30 mg/dL (1+)                   Microbiology Results (last 10 days)       Procedure Component Value - Date/Time    COVID-19 and FLU A/B PCR, 1 HR TAT - Swab, Nasopharynx [[id number]]  (Normal) Collected: 12/23/23 2216    Lab Status: Final result Specimen: Swab from Nasopharynx Updated: 12/23/23 2241     COVID19 Not Detected     Influenza A PCR Not Detected     Influenza B PCR Not Detected    Narrative:      Fact sheet for providers: https://www.fda.gov/media/207894/download    Fact sheet for patients: https://www.fda.gov/media/622044/download    Test performed by PCR.            ECG/EMG Results (most recent)       Procedure Component Value Units Date/Time    ECG 12 Lead [[id number]] Resulted: 12/24/23 0508     Updated: 12/24/23 0508    ECG 12 Lead Tachycardia [[id number]] Collected: 12/23/23 2208     Updated: 12/24/23 0755     QT Interval 339 ms      QTC Interval 456 ms     Narrative:      HEART RATE= 117  bpm  RR Interval= 552  ms  VT Interval= 167  ms  P Horizontal Axis= -59  deg  P Front Axis= 0  deg  QRSD Interval= 86  ms  QT Interval= 339  ms  QTcB= 456  ms  QRS Axis= -18  deg  T Wave Axis= 73  deg  - BORDERLINE ECG -  Afib with RVR  No previous ECG available for comparison  Electronically Signed By: Mark Ma (JEFFREY) 24-Dec-2023 07:55:38  Date and Time of Study: 2023-12-23 22:08:02                    No radiology results for the last 7 days      Estimated Creatinine Clearance: 76.1 mL/min (A) (by C-G formula based on SCr of 0.53 mg/dL (L)).    Assessment & Plan   Assessment/Plan       Active Hospital Problems    Diagnosis  POA    **New onset a-fib [I48.91]  Yes      Resolved Hospital Problems   No resolved problems to display.     Headache with nausea, vomiting and findings of new onset atrial fibrillation  -Troponin: 18, 25  -BMP and CBC generally unremarkable  -TSH:  0.525  -Lipase: 19  -UA showed no bacteria with 3-5 WBCs, 3-5 RBCs, 1+ protein, trace blood and 3+ ketones with a specific gravity of 1.016  -COVID and flu are negative  -EKG showed A-fib with RVR at a rate of 117 with PVC.  -Patient given IV Cardizem in the ED along with Zofran and 1 L fluid bolus  -Cardiology consulted  -Stress test negative for myocardial ischemia with normal left ventricular size and contractility with an EF calculated at 84%  -Patient started on p.o. Cardizem and Lovenox by cardiology  -A.m. TSH, magnesium, CBC and BMP ordered  -Continue aspirin     Hypothyroidism  -Continue Synthroid and liothyronine     GERD  -PPI            VTE Prophylaxis -   Mechanical Order History:        Ordered        12/24/23 0446  Place Sequential Compression Device  Once            12/24/23 0446  Maintain Sequential Compression Device  Continuous                          Pharmalogical Order History:        Ordered     Dose Route Frequency Stop    12/24/23 0448  Enoxaparin Sodium (LOVENOX) syringe 60 mg         1 mg/kg SC Every 12 Hours --    12/24/23 0446  Pharmacy to Dose enoxaparin (LOVENOX)  Status:  Discontinued        Question:  Indication of use  Answer:  Prophylaxis    -- XX Continuous PRN 12/24/23 0447    12/24/23 0446  Pharmacy to Dose enoxaparin (LOVENOX)         -- XX Continuous PRN --                    CODE STATUS:    Code Status and Medical Interventions:   Ordered at: 12/24/23 0446     Level Of Support Discussed With:    Patient     Code Status (Patient has no pulse and is not breathing):    CPR (Attempt to Resuscitate)     Medical Interventions (Patient has pulse or is breathing):    Full Support       This patient has been examined wearing personal protective equipment.     I discussed the patient's findings and my recommendations with patient, family, and nursing staff.      Signature:Electronically signed by Juan J Bardales PA-C, 12/24/23, 3:46 PM EST.

## 2023-12-24 NOTE — PLAN OF CARE
Goal Outcome Evaluation:  Plan of Care Reviewed With: patient        Progress: improving  Outcome Evaluation: pt rested well this shift and feels better and feels ready to go home

## 2023-12-24 NOTE — ED NOTES
Nursing report ED to floor  Helen Rousseau  78 y.o.  female    HPI:   Chief Complaint   Patient presents with    Vomiting       Admitting doctor:   Moshe Jackson MD    Admitting diagnosis:   The primary encounter diagnosis was New onset atrial fibrillation. A diagnosis of Nausea and vomiting, unspecified vomiting type was also pertinent to this visit.    Code status:   Current Code Status       Date Active Code Status Order ID Comments User Context       Not on file            Allergies:   Atorvastatin calcium, Pravastatin sodium, Simvastatin, and Sulfamethoxazole-trimethoprim    Isolation:  No active isolations     Fall Risk:  Fall Risk Assessment was completed, and patient is at moderate risk for falls.   Predictive Model Details         24 (Low) Factor Value    Calculated 12/24/2023 02:30 Age 78    Risk of Fall Model Musculoskeletal Assessment WDL     Active Peripheral IV Present     Respiratory Rate 18     Skin Assessment WDL     Financial Class Other     Magnesium 1.7 mg/dL     Number of Distinct Medication Classes administered 3     Drug Use No     Tobacco Use Quit     Ethan Scale not on file     Diastolic BP 73     Chloride 98 mmol/L     Peripheral Vascular Assessment WDL     Cardiac Assessment X     Gastrointestinal Assessment WDL     Total Bilirubin 0.6 mg/dL     ALT 26 U/L     Creatinine 0.63 mg/dL     Days after Admission 0.2     Calcium 9.5 mg/dL     Potassium 4 mmol/L     Albumin 4.6 g/dL         Weight:       12/23/23 2110   Weight: 59 kg (130 lb)       Intake and Output  No intake or output data in the 24 hours ending 12/24/23 0230    Diet:        Most recent vitals:   Vitals:    12/24/23 0102 12/24/23 0116 12/24/23 0143 12/24/23 0146   BP: 121/72 124/71 125/71 124/73   Pulse: 90 82 90 90   Resp:       Temp:       TempSrc:       SpO2: 98% 98% 98% 96%   Weight:       Height:           Active LDAs/IV Access:   Lines, Drains & Airways       Active LDAs       Name Placement date Placement time Site  Days    Peripheral IV 12/23/23 2153 Left Antecubital 12/23/23 2153  Antecubital  less than 1                    Skin Condition:   Skin Assessments (last day)       None             Labs (abnormal labs have a star):   Labs Reviewed   COMPREHENSIVE METABOLIC PANEL - Abnormal; Notable for the following components:       Result Value    Glucose 131 (*)     All other components within normal limits    Narrative:     GFR Normal >60  Chronic Kidney Disease <60  Kidney Failure <15    The GFR formula is only valid for adults with stable renal function between ages 18 and 70.   URINALYSIS W/ CULTURE IF INDICATED - Abnormal; Notable for the following components:    Appearance, UA Cloudy (*)     Ketones, UA 80 mg/dL (3+) (*)     Blood, UA Trace (*)     Protein, UA 30 mg/dL (1+) (*)     All other components within normal limits    Narrative:     In absence of clinical symptoms, the presence of pyuria, bacteria, and/or nitrites on the urinalysis result does not correlate with infection.   CBC WITH AUTO DIFFERENTIAL - Abnormal; Notable for the following components:    Neutrophil % 90.8 (*)     Lymphocyte % 7.3 (*)     Monocyte % 1.3 (*)     Eosinophil % 0.0 (*)     Neutrophils, Absolute 7.30 (*)     Lymphocytes, Absolute 0.60 (*)     All other components within normal limits   URINALYSIS, MICROSCOPIC ONLY - Abnormal; Notable for the following components:    RBC, UA 3-5 (*)     WBC, UA 3-5 (*)     Squamous Epithelial Cells, UA 3-6 (*)     All other components within normal limits   COVID-19 AND FLU A/B, NP SWAB IN TRANSPORT MEDIA 1 HR TAT - Normal    Narrative:     Fact sheet for providers: https://www.fda.gov/media/179923/download    Fact sheet for patients: https://www.fda.gov/media/869694/download    Test performed by PCR.   LIPASE - Normal   TSH - Normal   MAGNESIUM - Normal   RAINBOW DRAW    Narrative:     The following orders were created for panel order [place] Draw.  Procedure                               Abnormality          Status                     ---------                               -----------         ------                     Green Top (Gel)[[id number]]                                  Final result               Lavender Top[[id number]]                                                                Gold Top - SST[[id number]]                                   Final result               Light Blue Top[[id number]]                                   Final result                 Please view results for these tests on the individual orders.   GREEN TOP   GOLD TOP - SST   LIGHT BLUE TOP   CBC AND DIFFERENTIAL    Narrative:     The following orders were created for panel order CBC & Differential.  Procedure                               Abnormality         Status                     ---------                               -----------         ------                     CBC Auto Differential[[id number]]        Abnormal            Final result                 Please view results for these tests on the individual orders.       LOC: Person, Place, Time, and Situation    Telemetry:  Observation Unit    Cardiac Monitoring Ordered: yes    EKG:   ECG 12 Lead Tachycardia   Preliminary Result   HEART RATE= 117  bpm   RR Interval= 552  ms   OH Interval= 167  ms   P Horizontal Axis= -59  deg   P Front Axis= 0  deg   QRSD Interval= 86  ms   QT Interval= 339  ms   QTcB= 456  ms   QRS Axis= -18  deg   T Wave Axis= 73  deg   - BORDERLINE ECG -   Sinus tachycardia with irregular rate   No previous ECG available for comparison   Electronically Signed By:    Date and Time of Study: 2023-12-23 22:08:02          Medications Given in the ED:   Medications   sodium chloride 0.9 % flush 10 mL (has no administration in time range)   lactated ringers bolus 1,000 mL ( Intravenous Currently Infusing 12/24/23 0048)   ondansetron (ZOFRAN) injection 4 mg (4 mg Intravenous Given 12/23/23 2212)   dilTIAZem (CARDIZEM) injection 10 mg (10 mg Intravenous Given 12/24/23  0044)       Imaging results:  No radiology results for the last day    Social issues:   Social History     Socioeconomic History    Marital status:    Tobacco Use    Smoking status: Former     Packs/day: 0.25     Years: 4.00     Additional pack years: 0.00     Total pack years: 1.00     Types: Cigarettes     Start date: 1963     Quit date: 1967     Years since quittin.0     Passive exposure: Past    Smokeless tobacco: Never    Tobacco comments:     2 to 4 cigarettes a day.   Vaping Use    Vaping Use: Never used   Substance and Sexual Activity    Alcohol use: Yes     Comment: Maybe a 2-4 ounce glass of wine 1 month.    Drug use: Never    Sexual activity: Yes     Partners: Male     Birth control/protection: Post-menopausal, Hysterectomy, Same-sex partner       NIH Stroke Scale:  Interval: (not recorded)  1a. Level of Consciousness: (not recorded)  1b. LOC Questions: (not recorded)  1c. LOC Commands: (not recorded)  2. Best Gaze: (not recorded)  3. Visual: (not recorded)  4. Facial Palsy: (not recorded)  5a. Motor Arm, Left: (not recorded)  5b. Motor Arm, Right: (not recorded)  6a. Motor Leg, Left: (not recorded)  6b. Motor Leg, Right: (not recorded)  7. Limb Ataxia: (not recorded)  8. Sensory: (not recorded)  9. Best Language: (not recorded)  10. Dysarthria: (not recorded)  11. Extinction and Inattention (formerly Neglect): (not recorded)    Total (NIH Stroke Scale): (not recorded)     Additional notable assessment information:A&Ox4     Nursing report ED to floor:  Report called to HUNG Knowles RN   23 02:30 EST

## 2023-12-25 ENCOUNTER — READMISSION MANAGEMENT (OUTPATIENT)
Dept: CALL CENTER | Facility: HOSPITAL | Age: 78
End: 2023-12-25
Payer: MEDICARE

## 2023-12-25 VITALS
OXYGEN SATURATION: 97 % | BODY MASS INDEX: 19.38 KG/M2 | HEIGHT: 67 IN | HEART RATE: 76 BPM | TEMPERATURE: 97.7 F | DIASTOLIC BLOOD PRESSURE: 78 MMHG | WEIGHT: 123.46 LBS | RESPIRATION RATE: 16 BRPM | SYSTOLIC BLOOD PRESSURE: 135 MMHG

## 2023-12-25 LAB
ANION GAP SERPL CALCULATED.3IONS-SCNC: 8 MMOL/L (ref 5–15)
BUN SERPL-MCNC: 21 MG/DL (ref 8–23)
BUN/CREAT SERPL: 31.8 (ref 7–25)
CALCIUM SPEC-SCNC: 8.7 MG/DL (ref 8.6–10.5)
CHLORIDE SERPL-SCNC: 106 MMOL/L (ref 98–107)
CO2 SERPL-SCNC: 25 MMOL/L (ref 22–29)
CREAT SERPL-MCNC: 0.66 MG/DL (ref 0.57–1)
DEPRECATED RDW RBC AUTO: 43.3 FL (ref 37–54)
EGFRCR SERPLBLD CKD-EPI 2021: 89.4 ML/MIN/1.73
ERYTHROCYTE [DISTWIDTH] IN BLOOD BY AUTOMATED COUNT: 13 % (ref 12.3–15.4)
GLUCOSE SERPL-MCNC: 88 MG/DL (ref 65–99)
HCT VFR BLD AUTO: 35.2 % (ref 34–46.6)
HGB BLD-MCNC: 11.9 G/DL (ref 12–15.9)
MAGNESIUM SERPL-MCNC: 1.9 MG/DL (ref 1.6–2.4)
MCH RBC QN AUTO: 30.8 PG (ref 26.6–33)
MCHC RBC AUTO-ENTMCNC: 33.9 G/DL (ref 31.5–35.7)
MCV RBC AUTO: 90.8 FL (ref 79–97)
PLATELET # BLD AUTO: 271 10*3/MM3 (ref 140–450)
PMV BLD AUTO: 8.8 FL (ref 6–12)
POTASSIUM SERPL-SCNC: 3.6 MMOL/L (ref 3.5–5.2)
RBC # BLD AUTO: 3.87 10*6/MM3 (ref 3.77–5.28)
SODIUM SERPL-SCNC: 139 MMOL/L (ref 136–145)
TSH SERPL DL<=0.05 MIU/L-ACNC: 4.12 UIU/ML (ref 0.27–4.2)
WBC NRBC COR # BLD AUTO: 5.8 10*3/MM3 (ref 3.4–10.8)

## 2023-12-25 PROCEDURE — 84443 ASSAY THYROID STIM HORMONE: CPT | Performed by: INTERNAL MEDICINE

## 2023-12-25 PROCEDURE — 93010 ELECTROCARDIOGRAM REPORT: CPT | Performed by: INTERNAL MEDICINE

## 2023-12-25 PROCEDURE — 99214 OFFICE O/P EST MOD 30 MIN: CPT | Performed by: INTERNAL MEDICINE

## 2023-12-25 PROCEDURE — 85027 COMPLETE CBC AUTOMATED: CPT | Performed by: INTERNAL MEDICINE

## 2023-12-25 PROCEDURE — 83735 ASSAY OF MAGNESIUM: CPT | Performed by: INTERNAL MEDICINE

## 2023-12-25 PROCEDURE — 80048 BASIC METABOLIC PNL TOTAL CA: CPT | Performed by: INTERNAL MEDICINE

## 2023-12-25 PROCEDURE — G0378 HOSPITAL OBSERVATION PER HR: HCPCS

## 2023-12-25 RX ORDER — ONDANSETRON 4 MG/1
4 TABLET, ORALLY DISINTEGRATING ORAL EVERY 8 HOURS PRN
Qty: 30 TABLET | Refills: 0 | Status: SHIPPED | OUTPATIENT
Start: 2023-12-25 | End: 2024-01-03 | Stop reason: SDUPTHER

## 2023-12-25 RX ORDER — DILTIAZEM HYDROCHLORIDE 240 MG/1
240 CAPSULE, COATED, EXTENDED RELEASE ORAL
Qty: 30 CAPSULE | Refills: 0 | Status: SHIPPED | OUTPATIENT
Start: 2023-12-25

## 2023-12-25 RX ORDER — ONDANSETRON 4 MG/1
4 TABLET, FILM COATED ORAL EVERY 8 HOURS PRN
Qty: 30 TABLET | Refills: 0 | Status: SHIPPED | OUTPATIENT
Start: 2023-12-25 | End: 2023-12-25 | Stop reason: HOSPADM

## 2023-12-25 RX ADMIN — LIOTHYRONINE SODIUM 10 MCG: 5 TABLET ORAL at 08:24

## 2023-12-25 RX ADMIN — LEVOTHYROXINE SODIUM 50 MCG: 50 TABLET ORAL at 05:44

## 2023-12-25 RX ADMIN — PANTOPRAZOLE SODIUM 40 MG: 40 TABLET, DELAYED RELEASE ORAL at 05:44

## 2023-12-25 RX ADMIN — CETIRIZINE HYDROCHLORIDE 10 MG: 10 TABLET, FILM COATED ORAL at 08:25

## 2023-12-25 RX ADMIN — DILTIAZEM HYDROCHLORIDE 240 MG: 240 CAPSULE, COATED, EXTENDED RELEASE ORAL at 08:25

## 2023-12-25 RX ADMIN — APIXABAN 5 MG: 5 TABLET, FILM COATED ORAL at 05:44

## 2023-12-25 NOTE — PROGRESS NOTES
Referring Provider: Mark Ma MD    Reason for follow-up: Atrial fibrillation  Chest pain     Patient Care Team:  Maile Osorio MD as PCP - General (Family Medicine)    Subjective .      ROS    Since I have last seen, the patient has been without any chest discomfort ,shortness of breath, palpitations, dizziness or syncope.  Denies having any headache ,abdominal pain ,nausea, vomiting , diarrhea constipation, loss of weight or loss of appetite.  Denies having any excessive bruising ,hematuria or blood in the stool.    Review of all systems negative except as indicated    History  Past Medical History:   Diagnosis Date    Actinic keratosis     Atrial fibrillation     Basal cell adenocarcinoma     Bell's palsy     Left    Chronic constipation     DDD (degenerative disc disease), cervical     DDD (degenerative disc disease), cervical     Edema     GERD (gastroesophageal reflux disease) 2015    Headache     Hyperlipidemia     Hyperthyroidism 15/20 years ago    Hashimoto.    Hypothyroidism     Migraine headache     Mild carotid artery disease     Osteoarthritis     Statin-induced myositis     Urinary tract infection Have several a year       Past Surgical History:   Procedure Laterality Date    BASAL CELL CARCINOMA EXCISION      nose    BUNIONECTOMY Right     COLONOSCOPY      2016- due 5 years    HYSTERECTOMY      TUBAL ABDOMINAL LIGATION  ?? 1977       Family History   Problem Relation Age of Onset    Thyroid disease Mother         100 years bold    Diabetes Father         Diabetic    Hyperlipidemia Father         High blood pressure    Cancer Sister         Colon cancer    Hyperlipidemia Sister     Thyroid disease Sister     Diabetes Brother         Diabetic    Cancer Brother         Bladder cancer    Cancer Brother         Multiple Myeloma    Cancer Brother         Lung Cancer    Diabetes Brother         Diabetic.    Hyperlipidemia Brother     Hyperlipidemia Brother     Hyperlipidemia Brother      Kidney disease Sister     Thyroid disease Sister        Social History     Tobacco Use    Smoking status: Former     Packs/day: 0.25     Years: 4.00     Additional pack years: 0.00     Total pack years: 1.00     Types: Cigarettes     Start date: 1963     Quit date: 1967     Years since quittin.0     Passive exposure: Past    Smokeless tobacco: Never    Tobacco comments:     2 to 4 cigarettes a day.   Vaping Use    Vaping Use: Never used   Substance Use Topics    Alcohol use: Yes     Comment: Maybe a 2-4 ounce glass of wine 1 month.    Drug use: Never        Medications Prior to Admission   Medication Sig Dispense Refill Last Dose    aspirin 81 MG tablet 1 tablet.       bisacodyl (DULCOLAX) 5 MG EC tablet Take 1 tablet by mouth Daily As Needed for Constipation.       calcium (OS-ARMANDO) 600 MG tablet TAKE ONE TABLET BY MOUTH EVERY DAY 60 tablet 10     cetirizine (zyrTEC) 10 MG tablet ZYRTEC ALLERGY 10 MG TABS       coenzyme Q10 100 MG capsule Take 1 capsule by mouth Daily.       diclofenac (VOLTAREN) 75 MG EC tablet Take 1 tablet by mouth 2 (Two) Times a Day With Meals. 180 tablet 2     fluticasone (FLONASE) 50 MCG/ACT nasal spray 2 sprays into the nostril(s) as directed by provider Daily. 18 mL 11     Garlic 1000 MG capsule Take 1 capsule by mouth Daily.       liothyronine (CYTOMEL) 5 MCG tablet TAKE 2 TABLETS DAILY 180 tablet 1     Misc Natural Products (Neuriva) capsule Take  by mouth.       Multiple Vitamin (MULTIVITAMIN) capsule MULTIVITAMINS CAPS       [] nitrofurantoin, macrocrystal-monohydrate, (Macrobid) 100 MG capsule Take 1 capsule by mouth 2 (Two) Times a Day for 10 days. 20 capsule 0     omeprazole (priLOSEC) 40 MG capsule TAKE 1 CAPSULE DAILY 90 capsule 1     Polyethylene Glycol 3350 (MIRALAX PO) Take 1 each by mouth 4 (Four) Times a Week.       potassium chloride 10 MEQ CR tablet Take 1 tablet by mouth Daily. 90 tablet 2     promethazine (PHENERGAN) 25 MG tablet Take 1 tablet by mouth  "Every 8 (Eight) Hours As Needed for Nausea or Vomiting. 30 tablet 0     Synthroid 50 MCG tablet TAKE 1 TABLET DAILY 90 tablet 1        Allergies  Atorvastatin calcium, Pravastatin sodium, Simvastatin, and Sulfamethoxazole-trimethoprim    Scheduled Meds:apixaban, 5 mg, Oral, Q12H  cetirizine, 10 mg, Oral, Daily  dilTIAZem CD, 240 mg, Oral, Q24H  levothyroxine, 50 mcg, Oral, Q AM  liothyronine, 10 mcg, Oral, Daily  pantoprazole, 40 mg, Oral, Q AM  senna-docusate sodium, 2 tablet, Oral, BID      Continuous Infusions:Pharmacy to Dose enoxaparin (LOVENOX),       PRN Meds:.  acetaminophen    senna-docusate sodium **AND** polyethylene glycol **AND** bisacodyl **AND** bisacodyl    melatonin    metoprolol tartrate    ondansetron    Pharmacy to Dose enoxaparin (LOVENOX)    [COMPLETED] Insert Peripheral IV **AND** sodium chloride    Objective     VITAL SIGNS  Vitals:    12/24/23 1518 12/24/23 2318 12/25/23 0251 12/25/23 0626   BP: 117/53 103/61 118/71 135/78   BP Location: Right arm Left arm Left arm Right arm   Patient Position: Lying Lying Sitting Lying   Pulse: 73 70 71 76   Resp: 16 18 14 16   Temp: 98 °F (36.7 °C) 97.5 °F (36.4 °C) 97.7 °F (36.5 °C) 97.7 °F (36.5 °C)   TempSrc: Oral Temporal Temporal Temporal   SpO2: 99% 97% 97% 97%   Weight:       Height:           Flowsheet Rows      Flowsheet Row First Filed Value   Admission Height 170.2 cm (67\") Documented at 12/23/2023 2110   Admission Weight 59 kg (130 lb) Documented at 12/23/2023 2110            No intake or output data in the 24 hours ending 12/25/23 0716     TELEMETRY: Sinus rhythm    Physical Exam:  The patient is alert, oriented and in no distress.  Vital signs as noted above.  Head and neck revealed no carotid bruits or jugular venous distention.  No thyromegaly or lymphadenopathy is present  Lungs clear.  No wheezing.  Breath sounds are normal bilaterally.  Heart normal first and second heart sounds.  No murmur. No precordial rub is present.  No gallop is " present.  Abdomen soft and nontender.  No organomegaly is present.  Extremities with good peripheral pulses without any pedal edema.  Skin warm and dry.  Musculoskeletal system is grossly normal  CNS grossly normal    Reviewed and updated.    Results Review:   I reviewed the patient's new clinical results.  Lab Results (last 24 hours)       Procedure Component Value Units Date/Time    Basic Metabolic Panel [[id number]]  (Abnormal) Collected: 12/25/23 0317    Specimen: Blood Updated: 12/25/23 0417     Glucose 88 mg/dL      BUN 21 mg/dL      Creatinine 0.66 mg/dL      Sodium 139 mmol/L      Potassium 3.6 mmol/L      Chloride 106 mmol/L      CO2 25.0 mmol/L      Calcium 8.7 mg/dL      BUN/Creatinine Ratio 31.8     Anion Gap 8.0 mmol/L      eGFR 89.4 mL/min/1.73     Narrative:      GFR Normal >60  Chronic Kidney Disease <60  Kidney Failure <15    The GFR formula is only valid for adults with stable renal function between ages 18 and 70.    Magnesium [[id number]]  (Normal) Collected: 12/25/23 0317    Specimen: Blood Updated: 12/25/23 0417     Magnesium 1.9 mg/dL     TSH [[id number]]  (Normal) Collected: 12/25/23 0317    Specimen: Blood Updated: 12/25/23 0416     TSH 4.120 uIU/mL     CBC (No Diff) [[id number]]  (Abnormal) Collected: 12/25/23 0317    Specimen: Blood Updated: 12/25/23 0355     WBC 5.80 10*3/mm3      RBC 3.87 10*6/mm3      Hemoglobin 11.9 g/dL      Hematocrit 35.2 %      MCV 90.8 fL      MCH 30.8 pg      MCHC 33.9 g/dL      RDW 13.0 %      RDW-SD 43.3 fl      MPV 8.8 fL      Platelets 271 10*3/mm3     High Sensitivity Troponin T 2Hr [[id number]]  (Abnormal) Collected: 12/24/23 0914    Specimen: Blood from Arm, Left Updated: 12/24/23 0958     HS Troponin T 25 ng/L      Troponin T Delta 7 ng/L     Narrative:      High Sensitive Troponin T Reference Range:  <14.0 ng/L- Negative Female for AMI  <22.0 ng/L- Negative Male for AMI  >=14 - Abnormal Female indicating possible myocardial injury.  >=22 - Abnormal Male  indicating possible myocardial injury.   Clinicians would have to utilize clinical acumen, EKG, Troponin, and serial changes to determine if it is an Acute Myocardial Infarction or myocardial injury due to an underlying chronic condition.         Basic Metabolic Panel [[id number]]  (Abnormal) Collected: 12/24/23 0631    Specimen: Blood Updated: 12/24/23 0720     Glucose 83 mg/dL      BUN 11 mg/dL      Creatinine 0.53 mg/dL      Sodium 141 mmol/L      Potassium 3.8 mmol/L      Chloride 104 mmol/L      CO2 28.0 mmol/L      Calcium 8.9 mg/dL      BUN/Creatinine Ratio 20.8     Anion Gap 9.0 mmol/L      eGFR 94.2 mL/min/1.73     Narrative:      GFR Normal >60  Chronic Kidney Disease <60  Kidney Failure <15    The GFR formula is only valid for adults with stable renal function between ages 18 and 70.    High Sensitivity Troponin T [[id number]]  (Abnormal) Collected: 12/24/23 0631    Specimen: Blood Updated: 12/24/23 0720     HS Troponin T 18 ng/L     Narrative:      High Sensitive Troponin T Reference Range:  <14.0 ng/L- Negative Female for AMI  <22.0 ng/L- Negative Male for AMI  >=14 - Abnormal Female indicating possible myocardial injury.  >=22 - Abnormal Male indicating possible myocardial injury.   Clinicians would have to utilize clinical acumen, EKG, Troponin, and serial changes to determine if it is an Acute Myocardial Infarction or myocardial injury due to an underlying chronic condition.                 Imaging Results (Last 24 Hours)       ** No results found for the last 24 hours. **        LAB RESULTS (LAST 7 DAYS)    CBC  Results from last 7 days   Lab Units 12/25/23 0317 12/24/23 0631 12/23/23 2153   WBC 10*3/mm3 5.80 6.90 8.00   RBC 10*6/mm3 3.87 3.83 4.73   HEMOGLOBIN g/dL 11.9* 12.1 14.2   HEMATOCRIT % 35.2 35.6 43.6   MCV fL 90.8 92.8 92.3   PLATELETS 10*3/mm3 271 294 361       BMP  Results from last 7 days   Lab Units 12/25/23 0317 12/24/23 0631 12/23/23 2153   SODIUM mmol/L 139 141 136   POTASSIUM  mmol/L 3.6 3.8 4.0   CHLORIDE mmol/L 106 104 98   CO2 mmol/L 25.0 28.0 25.0   BUN mg/dL 21 11 14   CREATININE mg/dL 0.66 0.53* 0.63   GLUCOSE mg/dL 88 83 131*   MAGNESIUM mg/dL 1.9  --  1.7       CMP   Results from last 7 days   Lab Units 12/25/23  0317 12/24/23  0631 12/23/23  2153   SODIUM mmol/L 139 141 136   POTASSIUM mmol/L 3.6 3.8 4.0   CHLORIDE mmol/L 106 104 98   CO2 mmol/L 25.0 28.0 25.0   BUN mg/dL 21 11 14   CREATININE mg/dL 0.66 0.53* 0.63   GLUCOSE mg/dL 88 83 131*   ALBUMIN g/dL  --   --  4.6   BILIRUBIN mg/dL  --   --  0.6   ALK PHOS U/L  --   --  72   AST (SGOT) U/L  --   --  28   ALT (SGPT) U/L  --   --  26   LIPASE U/L  --   --  19         BNP        TROPONIN  Results from last 7 days   Lab Units 12/24/23  0914   HSTROP T ng/L 25*       CoAg        Creatinine Clearance  Estimated Creatinine Clearance: 61.1 mL/min (by C-G formula based on SCr of 0.66 mg/dL).    ABG        Radiology  No radiology results for the last day        EKG            I personally viewed and interpreted the patient's EKG/Telemetry data:    ECHOCARDIOGRAM:            STRESS TEST  Results for orders placed during the hospital encounter of 12/23/23    Stress Test With Myocardial Perfusion One Day    Interpretation Summary  Indications  Atrial fibrillation    This study was performed under the direct supervision of Drea nurse practitioner..    Resting ECG  Atrial fibrillation    The patient was injected with Lexiscan intravenously while constantly monitoring electrocardiogram and vital signs.  Patient did not have any chest discomfort ST abnormalities or ectopy with injection of Lexiscan.    Cardiolite was used as an imaging agent.    Cardiolite images showed uniform distribution of radionuclide without any evidence for myocardial ischemia.    Gated SPECT images revealed normal left ventricle size and contractility with ejection fraction of 84%.    Impression  ========  Lexiscan Cardiolite test is negative for myocardial  ischemia.    Gated SPECT images revealed normal left ventricular size and contractility with ejection fraction of 84%.        Cardiolite (Tc-99m sestamibi) stress test    CARDIAC CATHETERIZATION  No results found for this or any previous visit.                OTHER:         Assessment & Plan     Principal Problem:    New onset a-fib      ]]]]]]]]]]]]]]]]]]]]]]]  Impression  =============  -Recent nausea vomiting.     - Atrial fibrillation-new onset.     - History of COVID     -Dyslipidemia hypothyroidism pantoprazole     - Allergic to atorvastatin pravastatin simvastatin sulfamethizole trimethoprim.     - Non-smoker.  ============  Plan  =============     Patient presented with new onset atrial fibrillation.  Patient was given intravenous Cardizem  Patient has converted to sinus rhythm.  Continue p.o. Cardizem.    Anticoagulation.  [id number] score is 3  Start Eliquis.     Echocardiogram not performed  Will perform as an outpatient.    Stress Cardiolite test  Normal-12/24/2023    Medications were reviewed and updated.  Patient is on aspirin     Further plan will depend on patient's progress.    Follow-up in the office in 2 weeks.    Reviewed and updated-12/25/2023  ]]]]]]]]]]]]]]]]]]]]           Brennan Andrews MD  12/25/23  07:16 EST

## 2023-12-25 NOTE — OUTREACH NOTE
Prep Survey      Flowsheet Row Responses   [facility] patient discharged from? [facility]   Is LACE score < 7 ? Yes   Eligibility [facility]   Date of Admission 12/24/23   Date of Discharge 12/25/23   Discharge Disposition Home or Self Care   Discharge diagnosis New onset a-fib   Does the patient have one of the following disease processes/diagnoses(primary or secondary)? Other   Does the patient have Home health ordered? No   Is there a DME ordered? No   Prep survey completed? Yes            Bethanie BUCKNER - Registered Nurse

## 2023-12-26 ENCOUNTER — TRANSITIONAL CARE MANAGEMENT TELEPHONE ENCOUNTER (OUTPATIENT)
Dept: CALL CENTER | Facility: HOSPITAL | Age: 78
End: 2023-12-26
Payer: MEDICARE

## 2023-12-26 LAB
QT INTERVAL: 441 MS
QTC INTERVAL: 438 MS

## 2023-12-26 NOTE — CASE MANAGEMENT/SOCIAL WORK
Case Management Discharge Note      Final Note: Routine home         Selected Continued Care - Discharged on 12/25/2023 Admission date: 12/23/2023 - Discharge disposition: Home or Self Care       Transportation Services  Private: Car    Final Discharge Disposition Code: 01 - home or self-care

## 2023-12-26 NOTE — OUTREACH NOTE
Call Center TCM Note      Flowsheet Row Responses   [facility] patient discharged from? [facility]   Does the patient have one of the following disease processes/diagnoses(primary or secondary)? Other   TCM attempt successful? No   Unsuccessful attempts Attempt 1            Pema Guevara RN    12/26/2023, 14:58 EST

## 2023-12-26 NOTE — OUTREACH NOTE
Call Center TCM Note      Flowsheet Row Responses   [facility] patient discharged from? [facility]   Does the patient have one of the following disease processes/diagnoses(primary or secondary)? Other   TCM attempt successful? No   Unsuccessful attempts Attempt 2            Pema Guevara RN    12/26/2023, 16:52 EST

## 2023-12-27 ENCOUNTER — TRANSITIONAL CARE MANAGEMENT TELEPHONE ENCOUNTER (OUTPATIENT)
Dept: CALL CENTER | Facility: HOSPITAL | Age: 78
End: 2023-12-27
Payer: MEDICARE

## 2023-12-27 NOTE — OUTREACH NOTE
Call Center TCM Note      Flowsheet Row Responses   [facility] patient discharged from? [facility]   Does the patient have one of the following disease processes/diagnoses(primary or secondary)? Other   TCM attempt successful? No  [VR- Contacts]   Unsuccessful attempts Attempt 3            Terrie Torres RN    12/27/2023, 10:40 EST

## 2024-01-03 ENCOUNTER — TELEPHONE (OUTPATIENT)
Dept: FAMILY MEDICINE CLINIC | Facility: CLINIC | Age: 79
End: 2024-01-03
Payer: MEDICARE

## 2024-01-03 RX ORDER — ONDANSETRON 4 MG/1
4 TABLET, ORALLY DISINTEGRATING ORAL EVERY 8 HOURS PRN
Qty: 30 TABLET | Refills: 0 | Status: SHIPPED | OUTPATIENT
Start: 2024-01-03

## 2024-01-03 NOTE — TELEPHONE ENCOUNTER
I called and [facility] had received rx for zofran ODT and are filling it. But telephone message says vlad. I left Lori a detailed vm with provider instructions. Hub can relay this and providers message.

## 2024-01-03 NOTE — TELEPHONE ENCOUNTER
Name: JEWEL YO    Relationship: Emergency Contact    Best Callback Number:  [phone]    HUB PROVIDED THE RELAY MESSAGE FROM THE OFFICE   PATIENT HAS FURTHER QUESTIONS AND WOULD LIKE A CALL BACK AT THE FOLLOWING PHONE NUMBER [phone]    ADDITIONAL INFORMATION:      JEWEL IS ASKING IF WE CAN SEND THIS TO [facility] IN [facility].    PLEASE ADVISE

## 2024-01-03 NOTE — TELEPHONE ENCOUNTER
30 of the disintegrating zofran that is put on the tongue were sent out [facility].  Check with pharmacy and see if she filled it. If she continue to vomit or becomes weak and lethargic or about to pass out, she needs to be the ER

## 2024-01-08 ENCOUNTER — TELEPHONE (OUTPATIENT)
Dept: CARDIOLOGY | Facility: CLINIC | Age: 79
End: 2024-01-08
Payer: MEDICARE

## 2024-01-08 DIAGNOSIS — I48.0 AF (PAROXYSMAL ATRIAL FIBRILLATION): Primary | ICD-10-CM

## 2024-01-08 NOTE — TELEPHONE ENCOUNTER
"  Caller: Helen Rousseau \"Val\"    Relationship: Self    Best call back number: [phone]    What is the best time to reach you: TIME    Who are you requesting to speak with (clinical staff, provider,  specific staff member): CLINICAL        What was the call regarding: PT WAS IN HOSPITAL 12/23/2023-12/25/2023 SHE WAS SUPPOSE TO HAVE ECHO BUT ECHO WAS CLOSED ON CHRISTMAS. SHE WAS TOLD THEY WOULD CALL HER TO SET IT UP, NO ONE HAS CALLED.   DOES SHE STILL NEED TO HAVE ECHO DONE?  PLEASE CALL PT    Is it okay if the provider responds through [facility]: NO          "

## 2024-01-09 ENCOUNTER — OFFICE VISIT (OUTPATIENT)
Dept: FAMILY MEDICINE CLINIC | Facility: CLINIC | Age: 79
End: 2024-01-09
Payer: MEDICARE

## 2024-01-09 VITALS
TEMPERATURE: 98.8 F | HEIGHT: 67 IN | WEIGHT: 123.5 LBS | DIASTOLIC BLOOD PRESSURE: 72 MMHG | BODY MASS INDEX: 19.38 KG/M2 | HEART RATE: 63 BPM | OXYGEN SATURATION: 98 % | SYSTOLIC BLOOD PRESSURE: 137 MMHG

## 2024-01-09 DIAGNOSIS — N39.0 RECURRENT UTI: Primary | ICD-10-CM

## 2024-01-09 DIAGNOSIS — R11.2 NAUSEA AND VOMITING, UNSPECIFIED VOMITING TYPE: ICD-10-CM

## 2024-01-09 LAB
BILIRUB BLD-MCNC: NEGATIVE MG/DL
CLARITY, POC: ABNORMAL
COLOR UR: YELLOW
EXPIRATION DATE: ABNORMAL
GLUCOSE UR STRIP-MCNC: NEGATIVE MG/DL
KETONES UR QL: NEGATIVE
LEUKOCYTE EST, POC: ABNORMAL
Lab: ABNORMAL
NITRITE UR-MCNC: NEGATIVE MG/ML
PH UR: 7 [PH] (ref 5–8)
PROT UR STRIP-MCNC: NEGATIVE MG/DL
RBC # UR STRIP: NEGATIVE /UL
SP GR UR: 1.01 (ref 1–1.03)
UROBILINOGEN UR QL: NORMAL

## 2024-01-09 PROCEDURE — 87086 URINE CULTURE/COLONY COUNT: CPT | Performed by: FAMILY MEDICINE

## 2024-01-09 RX ORDER — ONDANSETRON 4 MG/1
4 TABLET, FILM COATED ORAL EVERY 8 HOURS PRN
COMMUNITY
Start: 2023-12-25

## 2024-01-09 RX ORDER — DILTIAZEM HYDROCHLORIDE 240 MG/1
240 CAPSULE, COATED, EXTENDED RELEASE ORAL
Qty: 90 CAPSULE | Refills: 3 | Status: SHIPPED | OUTPATIENT
Start: 2024-01-09

## 2024-01-09 NOTE — PROGRESS NOTES
Subjective   Helen Rousseau is a 78 y.o. female.     History of Present Illness  Here for follow up after hospitalization at [facility] with N/V        Helen Rousseau is a 78-year-old female who presents today for follow-up after a recent hospitalization at [facility] with nausea.     She had been vomiting since that morning. She is feeling better and can eat okay now. She notes that it usually starts in the morning. She had not eaten anything before it started. She states that it occurred again on 01/03/2024 with the same symptoms. She was given Zofran, and it resolved. She did not eat or drink for the remainder of that day. However, eventually, she was able to eat and drink again. She cannot think of anything that she did the night before, any of these sets that triggered any of it. She states that both times she was vomiting bile and clear liquid. She denies any abdominal pain. She expresses concern that this could be related to her gallbladder. Her  had a little bit of nausea and vomiting before she did on [place], but it was quick.    She states that they changed some of her medications at the hospital. She was discontinued on diclofenac. She was put on Eliquis. She inquired about taking Eliquis and Aspirin. She has not had any leg cramps. She states she takes magnesium, potassium, and calcium. However, she inquired about alternating the days she takes them.    Her follow-up appointment with the cardiologist is 01/29/2024.    She takes Pravastatin.    She states that she had a UTI on 12/01/2023 and again on 12/14/2023. She was given antibiotics for 14 days for the first time and 10 days for the second occurrence. She states she took it the last day after she got home from the hospital. She missed her appointment with Dr. Mcgarry secondary to being ill.  The following portions of the patient's history were reviewed and updated as appropriate: allergies, current medications, past family history, past  medical history, past social history, past surgical history, and problem list.  Past Medical History:   Diagnosis Date    Actinic keratosis     Atrial fibrillation     Basal cell adenocarcinoma     Bell's palsy     Left    Chronic constipation     DDD (degenerative disc disease), cervical     DDD (degenerative disc disease), cervical     Edema     GERD (gastroesophageal reflux disease)     Headache     Hyperlipidemia     Hyperthyroidism 15/20 years ago    Hashimoto.    Hypothyroidism     Migraine headache     Mild carotid artery disease     Osteoarthritis     Statin-induced myositis     Urinary tract infection Have several a year     Past Surgical History:   Procedure Laterality Date    BASAL CELL CARCINOMA EXCISION      nose    BUNIONECTOMY Right     COLONOSCOPY      2016- due 5 years    HYSTERECTOMY      TUBAL ABDOMINAL LIGATION  ??      Family History   Problem Relation Age of Onset    Thyroid disease Mother         100 years bold    Diabetes Father         Diabetic    Hyperlipidemia Father         High blood pressure    Cancer Sister         Colon cancer    Hyperlipidemia Sister     Thyroid disease Sister     Diabetes Brother         Diabetic    Cancer Brother         Bladder cancer    Cancer Brother         Multiple Myeloma    Cancer Brother         Lung Cancer    Diabetes Brother         Diabetic.    Hyperlipidemia Brother     Hyperlipidemia Brother     Hyperlipidemia Brother     Kidney disease Sister     Thyroid disease Sister      Social History     Socioeconomic History    Marital status:    Tobacco Use    Smoking status: Former     Packs/day: 0.25     Years: 4.00     Additional pack years: 0.00     Total pack years: 1.00     Types: Cigarettes     Start date: 1963     Quit date: 1967     Years since quittin.0     Passive exposure: Past    Smokeless tobacco: Never    Tobacco comments:     2 to 4 cigarettes a day.   Vaping Use    Vaping Use: Never used   Substance and Sexual  Activity    Alcohol use: Yes     Comment: Maybe a 2-4 ounce glass of wine 1 month.    Drug use: Never    Sexual activity: Yes     Partners: Male     Birth control/protection: Post-menopausal, Hysterectomy, Same-sex partner         Current Outpatient Medications:     apixaban (ELIQUIS) 5 MG tablet tablet, Take 1 tablet by mouth Every 12 (Twelve) Hours. Indications: Atrial Fibrillation, Disp: 180 tablet, Rfl: 3    dilTIAZem CD (CARDIZEM CD) 240 MG 24 hr capsule, Take 1 capsule by mouth Daily., Disp: 90 capsule, Rfl: 3    ondansetron (ZOFRAN) 4 MG tablet, Take 1 tablet by mouth Every 8 (Eight) Hours As Needed for Nausea or Vomiting., Disp: , Rfl:     aspirin 81 MG tablet, 1 tablet., Disp: , Rfl:     bisacodyl (DULCOLAX) 5 MG EC tablet, Take 1 tablet by mouth Daily As Needed for Constipation., Disp: , Rfl:     calcium (OS-ARMANDO) 600 MG tablet, TAKE ONE TABLET BY MOUTH EVERY DAY, Disp: 60 tablet, Rfl: 10    cetirizine (zyrTEC) 10 MG tablet, ZYRTEC ALLERGY 10 MG TABS, Disp: , Rfl:     coenzyme Q10 100 MG capsule, Take 1 capsule by mouth Daily., Disp: , Rfl:     fluticasone (FLONASE) 50 MCG/ACT nasal spray, 2 sprays into the nostril(s) as directed by provider Daily., Disp: 18 mL, Rfl: 11    Garlic 1000 MG capsule, Take 1 capsule by mouth Daily., Disp: , Rfl:     liothyronine (CYTOMEL) 5 MCG tablet, TAKE 2 TABLETS DAILY, Disp: 180 tablet, Rfl: 1    Misc Natural Products (Neuriva) capsule, Take  by mouth., Disp: , Rfl:     Multiple Vitamin (MULTIVITAMIN) capsule, MULTIVITAMINS CAPS, Disp: , Rfl:     omeprazole (priLOSEC) 40 MG capsule, TAKE 1 CAPSULE DAILY, Disp: 90 capsule, Rfl: 1    ondansetron ODT (ZOFRAN-ODT) 4 MG disintegrating tablet, Place 1 tablet on the tongue Every 8 (Eight) Hours As Needed for Nausea or Vomiting., Disp: 30 tablet, Rfl: 0    Polyethylene Glycol 3350 (MIRALAX PO), Take 1 each by mouth 4 (Four) Times a Week., Disp: , Rfl:     potassium chloride 10 MEQ CR tablet, Take 1 tablet by mouth Daily., Disp: 90  "tablet, Rfl: 2    promethazine (PHENERGAN) 25 MG tablet, Take 1 tablet by mouth Every 8 (Eight) Hours As Needed for Nausea or Vomiting., Disp: 30 tablet, Rfl: 0    Synthroid 50 MCG tablet, TAKE 1 TABLET DAILY, Disp: 90 tablet, Rfl: 1    Review of Systems  /72 (BP Location: Left arm, Patient Position: Sitting, Cuff Size: Adult)   Pulse 63   Temp 98.8 °F (37.1 °C) (Temporal)   Ht 170.2 cm (67\")   Wt 56 kg (123 lb 8 oz)   SpO2 98%   BMI 19.34 kg/m²   BMI is within normal parameters. No other follow-up for BMI required.     A review of systems was performed, and the pertinent positives are noted in the HPI.     Objective   Physical Exam  Vitals and nursing note reviewed.   Constitutional:       Appearance: Normal appearance.   Cardiovascular:      Rate and Rhythm: Normal rate and regular rhythm.      Heart sounds: Normal heart sounds.   Pulmonary:      Effort: Pulmonary effort is normal.      Breath sounds: Normal breath sounds.   Abdominal:      General: Abdomen is flat. Bowel sounds are normal.      Palpations: Abdomen is soft.      Tenderness: There is no abdominal tenderness. There is no right CVA tenderness, left CVA tenderness, guarding or rebound.   Neurological:      Mental Status: She is alert.       Brief Urine Lab Results  (Last result in the past 365 days)        Color   Clarity   Blood   Leuk Est   Nitrite   Protein   CREAT   Urine HCG        01/09/24 1053 Yellow   Slightly Cloudy   Negative   Trace   Negative   Negative                     Assessment & Plan   Problems Addressed this Visit    None  Visit Diagnoses       Recurrent UTI    -  Primary    Relevant Orders    POCT urinalysis dipstick, automated (Completed)    Urine Culture - Urine, Urine, Clean Catch    Nausea and vomiting, unspecified vomiting type              Diagnoses         Codes Comments    Recurrent UTI    -  Primary ICD-10-CM: N39.0  ICD-9-CM: 599.0     Nausea and vomiting, unspecified vomiting type     ICD-10-CM: " R11.2  ICD-9-CM: 787.01         1. Recurrent UTI  - Urinalysis shows trace of leukocytes. We will send it for culture and treat accordingly. She will keep the follow-up appointment with the urologist.    2. Nausea and vomiting  - She had another episode of nausea and vomiting last week that resolved quickly with Zofran. She mentioned concerns about gallbladder disease being a source of this, but at the time that the problem started, she had been on diclofenac. This has stopped. At this time, with a benign exam and no other symptoms besides nausea and vomiting, I recommend that we hold off on doing any gallbladder studies. She will push fluids. If she has any further episodes, she will let me know, and I will pursue gallbladder studies at that time.    Transcribed from ambient dictation for Maile Osorio MD by Shira Kumar.  01/09/24   11:33 EST    Patient or patient representative verbalized consent to the visit recording.  I have personally performed the services described in this document as transcribed by the above individual, and it is both accurate and complete.

## 2024-01-10 LAB — BACTERIA SPEC AEROBE CULT: NO GROWTH

## 2024-01-11 NOTE — PROGRESS NOTES
Left message to return call to doctor's office at [facility]. Either to get test results or message from provider.

## 2024-01-14 NOTE — PROGRESS NOTES
Encounter Date:01/29/2024    Last seen-12/25/2023(hospital)      Patient ID: Helen Rousseau is a 78 y.o. female.    Chief complaint  Atrial fibrillation  Chronic anticoagulation  Dyslipidemia  Hypothyroidism    History of present illness  Since I have last seen, the patient has been without any chest discomfort ,shortness of breath, palpitations, dizziness or syncope.  Denies having any headache ,abdominal pain ,nausea, vomiting , diarrhea constipation, loss of weight or loss of appetite.  Denies having any excessive bruising ,hematuria or blood in the stool.    Review of all systems negative except as indicated.    Reviewed ROS.  ]]]]]]]]]]]]]]]]]]]]]]  Impression  =============   - Atrial fibrillation-new onset.  Patient has converted with intravenous Cardizem and maintaining sinus rhythm.    Echocardiogram 1/29/2024    Mild mitral regurgitation.  Moderate tricuspid regurgitation.  Mild left atrial enlargement.  No evidence for pulmonary hypertension.  Normal systolic longitudinal left ventricular strain at -20%.  Normal diastolic ventricular function.  Left ventricular size and contractility is normal with ejection fraction of 60%.    Stress Cardiolite test  Normal-12/24/2023    - History of COVID     -Dyslipidemia hypothyroidism pantoprazole     - Allergic to atorvastatin pravastatin simvastatin sulfamethizole trimethoprim.     - Non-smoker.  ============  Plan  =============     History of new onset atrial fibrillation.  Patient was given intravenous Cardizem  Patient has converted to sinus rhythm.  Continue p.o. Cardizem.     Anticoagulation.  [id number] score is 3  Continue Eliquis.     Echocardiogram 1/29/2024     Mild mitral regurgitation.  Moderate tricuspid regurgitation.  Mild left atrial enlargement.  No evidence for pulmonary hypertension.  Normal systolic longitudinal left ventricular strain at -20%.  Normal diastolic ventricular function.  Left ventricular size and contractility is normal with  ejection fraction of 60%.    Stress Cardiolite test  Normal-12/24/2023    Patient was educated regarding the results of the testing.    Hypothyroidism-on levothyroxine     Medications were reviewed and updated.  Patient is on Eliquis diltiazem levothyroxine     Further plan will depend on patient's progress.     Follow-up in the office 6 weeks with EKG     Event updated-1/29/2024  ]]]]]]]]]]]]]]]]]]]]         Diagnosis Plan   1. AF (paroxysmal atrial fibrillation)        2. Chronic anticoagulation        3. Hypothyroidism (acquired)        LAB RESULTS (LAST 7 DAYS)    CBC        BMP        CMP         BNP        TROPONIN        CoAg        Creatinine Clearance  CrCl cannot be calculated (Patient's most recent lab result is older than the maximum 30 days allowed.).    ABG        Radiology  No radiology results for the last day                The following portions of the patient's history were reviewed and updated as appropriate: allergies, current medications, past family history, past medical history, past social history, past surgical history, and problem list.    Review of Systems   Constitutional: Negative for malaise/fatigue.   Cardiovascular:  Negative for chest pain, dyspnea on exertion, leg swelling and palpitations.   Respiratory:  Negative for cough and shortness of breath.    Gastrointestinal:  Negative for abdominal pain, nausea and vomiting.   Neurological:  Negative for dizziness, focal weakness, headaches, light-headedness and numbness.   All other systems reviewed and are negative.        Current Outpatient Medications:     apixaban (ELIQUIS) 5 MG tablet tablet, Take 1 tablet by mouth Every 12 (Twelve) Hours. Indications: Atrial Fibrillation, Disp: 28 tablet, Rfl: 0    aspirin 81 MG tablet, 1 tablet., Disp: , Rfl:     bisacodyl (DULCOLAX) 5 MG EC tablet, Take 1 tablet by mouth Daily As Needed for Constipation., Disp: , Rfl:     calcium (OS-ARMANDO) 600 MG tablet, TAKE ONE TABLET BY MOUTH EVERY DAY, Disp:  60 tablet, Rfl: 10    cetirizine (zyrTEC) 10 MG tablet, ZYRTEC ALLERGY 10 MG TABS, Disp: , Rfl:     coenzyme Q10 100 MG capsule, Take 1 capsule by mouth Daily., Disp: , Rfl:     dilTIAZem CD (CARDIZEM CD) 240 MG 24 hr capsule, Take 1 capsule by mouth Daily., Disp: 14 capsule, Rfl: 0    fluticasone (FLONASE) 50 MCG/ACT nasal spray, 2 sprays into the nostril(s) as directed by provider Daily., Disp: 18 mL, Rfl: 11    Garlic 1000 MG capsule, Take 1 capsule by mouth Daily., Disp: , Rfl:     liothyronine (CYTOMEL) 5 MCG tablet, TAKE 2 TABLETS DAILY, Disp: 180 tablet, Rfl: 1    Misc Natural Products (Neuriva) capsule, Take  by mouth., Disp: , Rfl:     Multiple Vitamin (MULTIVITAMIN) capsule, MULTIVITAMINS CAPS, Disp: , Rfl:     omeprazole (priLOSEC) 40 MG capsule, TAKE 1 CAPSULE DAILY, Disp: 90 capsule, Rfl: 1    ondansetron ODT (ZOFRAN-ODT) 4 MG disintegrating tablet, Place 1 tablet on the tongue Every 8 (Eight) Hours As Needed for Nausea or Vomiting., Disp: 30 tablet, Rfl: 0    Polyethylene Glycol 3350 (MIRALAX PO), Take 1 each by mouth 4 (Four) Times a Week., Disp: , Rfl:     potassium chloride 10 MEQ CR tablet, Take 1 tablet by mouth Daily., Disp: 90 tablet, Rfl: 2    promethazine (PHENERGAN) 25 MG tablet, Take 1 tablet by mouth Every 8 (Eight) Hours As Needed for Nausea or Vomiting., Disp: 30 tablet, Rfl: 0    Synthroid 50 MCG tablet, TAKE 1 TABLET DAILY, Disp: 90 tablet, Rfl: 1    Allergies   Allergen Reactions    Atorvastatin Calcium Myalgia    Pravastatin Sodium Myalgia    Simvastatin Myalgia    Sulfamethoxazole-Trimethoprim Rash       Family History   Problem Relation Age of Onset    Thyroid disease Mother         100 years bold    Diabetes Father         Diabetic    Hyperlipidemia Father         High blood pressure    Cancer Sister         Colon cancer    Hyperlipidemia Sister     Thyroid disease Sister     Diabetes Brother         Diabetic    Cancer Brother         Bladder cancer    Cancer Brother          Multiple Myeloma    Cancer Brother         Lung Cancer    Diabetes Brother         Diabetic.    Hyperlipidemia Brother     Hyperlipidemia Brother     Hyperlipidemia Brother     Kidney disease Sister     Thyroid disease Sister        Past Surgical History:   Procedure Laterality Date    BASAL CELL CARCINOMA EXCISION      nose    BUNIONECTOMY Right     COLONOSCOPY      2016- due 5 years    HYSTERECTOMY      TUBAL ABDOMINAL LIGATION  ??        Past Medical History:   Diagnosis Date    Actinic keratosis     Atrial fibrillation     Basal cell adenocarcinoma     Bell's palsy     Left    Chronic constipation     DDD (degenerative disc disease), cervical     DDD (degenerative disc disease), cervical     Edema     GERD (gastroesophageal reflux disease)     Headache     Hyperlipidemia     Hyperthyroidism 15/20 years ago    Hashimoto.    Hypothyroidism     Migraine headache     Mild carotid artery disease     Osteoarthritis     Statin-induced myositis     Urinary tract infection Have several a year       Family History   Problem Relation Age of Onset    Thyroid disease Mother         100 years bold    Diabetes Father         Diabetic    Hyperlipidemia Father         High blood pressure    Cancer Sister         Colon cancer    Hyperlipidemia Sister     Thyroid disease Sister     Diabetes Brother         Diabetic    Cancer Brother         Bladder cancer    Cancer Brother         Multiple Myeloma    Cancer Brother         Lung Cancer    Diabetes Brother         Diabetic.    Hyperlipidemia Brother     Hyperlipidemia Brother     Hyperlipidemia Brother     Kidney disease Sister     Thyroid disease Sister        Social History     Socioeconomic History    Marital status:    Tobacco Use    Smoking status: Former     Packs/day: 0.25     Years: 4.00     Additional pack years: 0.00     Total pack years: 1.00     Types: Cigarettes     Start date: 1963     Quit date: 1967     Years since quittin.1     Passive  "exposure: Past    Smokeless tobacco: Never    Tobacco comments:     2 to 4 cigarettes a day.   Vaping Use    Vaping Use: Never used   Substance and Sexual Activity    Alcohol use: Yes     Comment: Maybe a 2-4 ounce glass of wine 1 month.    Drug use: Never    Sexual activity: Yes     Partners: Male     Birth control/protection: Post-menopausal, Hysterectomy         Procedures      Objective:       Physical Exam    /64 (BP Location: Right arm, Patient Position: Sitting, Cuff Size: Adult)   Pulse 63   Ht 170.2 cm (67\")   Wt 57.6 kg (127 lb)   BMI 19.89 kg/m²   The patient is alert, oriented and in no distress.    Vital signs as noted above.    Head and neck revealed no carotid bruits or jugular venous distension.  No thyromegaly or lymphadenopathy is present.    Lungs clear.  No wheezing.  Breath sounds are normal bilaterally.    Heart normal first and second heart sounds.  No murmur..  No pericardial rub is present.  No gallop is present.    Abdomen soft and nontender.  No organomegaly is present.    Extremities revealed good peripheral pulses without any pedal edema.    Skin warm and dry.    Musculoskeletal system is grossly normal.    CNS grossly normal.    Reviewed and updated.      "

## 2024-01-19 NOTE — TELEPHONE ENCOUNTER
"  Caller: Helen Rousseau \"Val\"    Relationship: Self    Best call back number:     Requested Prescriptions:   Requested Prescriptions     Pending Prescriptions Disp Refills    apixaban (ELIQUIS) 5 MG tablet tablet 180 tablet 3     Sig: Take 1 tablet by mouth Every 12 (Twelve) Hours. Indications: Atrial Fibrillation    dilTIAZem CD (CARDIZEM CD) 240 MG 24 hr capsule 90 capsule 3     Sig: Take 1 capsule by mouth Daily.        Pharmacy where request should be sent: [facility]/PHARMACY #[id number] [address] [phone] [place] [phone]      Last office visit with prescribing clinician: 1/9/2024   Last telemedicine visit with prescribing clinician: Visit date not found   Next office visit with prescribing clinician: 12/3/2024     Additional details provided by patient: PATIENT STATES SHE IS NEEDING A 5 DAY SUPPLY SENT TO THE PHARMACY TO LAST UNTIL SHE GETS HER MAIL ORDER MEDICATIONS     Does the patient have less than a 3 day supply:  [x] Yes  [] No    Would you like a call back once the refill request has been completed: [] Yes [x] No    If the office needs to give you a call back, can they leave a voicemail: [] Yes [x] No    Krystle Chacko Rep   01/19/24 14:59 EST       "

## 2024-01-23 RX ORDER — DILTIAZEM HYDROCHLORIDE 240 MG/1
240 CAPSULE, COATED, EXTENDED RELEASE ORAL
Qty: 90 CAPSULE | Refills: 3 | Status: SHIPPED | OUTPATIENT
Start: 2024-01-23 | End: 2024-01-23 | Stop reason: SDUPTHER

## 2024-01-23 RX ORDER — DILTIAZEM HYDROCHLORIDE 240 MG/1
240 CAPSULE, COATED, EXTENDED RELEASE ORAL
Qty: 14 CAPSULE | Refills: 0 | Status: SHIPPED | OUTPATIENT
Start: 2024-01-23

## 2024-01-23 NOTE — TELEPHONE ENCOUNTER
[facility]  I just want to confirm the local pharmacy  [facility]  [facility] [place]

## 2024-01-23 NOTE — TELEPHONE ENCOUNTER
Pt stopped in today and said Eli has not received the RX for Diltiazem and Eliquis . She only has 1 left so she needs a 1 month supply of each to go to [facility] [address] as soon as possible and needs us to resend the 3 mth supply RX to eli please.

## 2024-01-29 ENCOUNTER — OFFICE VISIT (OUTPATIENT)
Dept: CARDIOLOGY | Facility: CLINIC | Age: 79
End: 2024-01-29
Payer: MEDICARE

## 2024-01-29 ENCOUNTER — HOSPITAL ENCOUNTER (OUTPATIENT)
Dept: CARDIOLOGY | Facility: HOSPITAL | Age: 79
Discharge: HOME | End: 2024-01-29
Admitting: INTERNAL MEDICINE
Payer: MEDICARE

## 2024-01-29 VITALS
SYSTOLIC BLOOD PRESSURE: 134 MMHG | DIASTOLIC BLOOD PRESSURE: 64 MMHG | WEIGHT: 123 LBS | BODY MASS INDEX: 19.3 KG/M2 | HEIGHT: 67 IN

## 2024-01-29 VITALS
WEIGHT: 127 LBS | HEIGHT: 67 IN | HEART RATE: 63 BPM | DIASTOLIC BLOOD PRESSURE: 64 MMHG | SYSTOLIC BLOOD PRESSURE: 134 MMHG | BODY MASS INDEX: 19.93 KG/M2

## 2024-01-29 DIAGNOSIS — I48.0 AF (PAROXYSMAL ATRIAL FIBRILLATION): Primary | ICD-10-CM

## 2024-01-29 DIAGNOSIS — I48.0 AF (PAROXYSMAL ATRIAL FIBRILLATION): ICD-10-CM

## 2024-01-29 DIAGNOSIS — E03.9 HYPOTHYROIDISM (ACQUIRED): ICD-10-CM

## 2024-01-29 DIAGNOSIS — Z79.01 CHRONIC ANTICOAGULATION: ICD-10-CM

## 2024-01-29 LAB
AORTIC DIMENSIONLESS INDEX: 0.79 (DI)
BH CV ECHO MEAS - ACS: 2.03 CM
BH CV ECHO MEAS - AI P1/2T: 508.8 MSEC
BH CV ECHO MEAS - AO MAX PG: 15.5 MMHG
BH CV ECHO MEAS - AO MEAN PG: 7.8 MMHG
BH CV ECHO MEAS - AO ROOT DIAM: 3.3 CM
BH CV ECHO MEAS - AO V2 MAX: 196.5 CM/SEC
BH CV ECHO MEAS - AO V2 VTI: 40.4 CM
BH CV ECHO MEAS - AVA(I,D): 1.98 CM2
BH CV ECHO MEAS - EDV(CUBED): 83.6 ML
BH CV ECHO MEAS - EDV(MOD-SP4): 58 ML
BH CV ECHO MEAS - EF(MOD-BP): 62 %
BH CV ECHO MEAS - EF(MOD-SP4): 62.3 %
BH CV ECHO MEAS - ESV(CUBED): 32.4 ML
BH CV ECHO MEAS - ESV(MOD-SP4): 21.9 ML
BH CV ECHO MEAS - FS: 27.1 %
BH CV ECHO MEAS - IVS/LVPW: 0.75 CM
BH CV ECHO MEAS - IVSD: 0.65 CM
BH CV ECHO MEAS - LA DIMENSION: 3.5 CM
BH CV ECHO MEAS - LV MASS(C)D: 100.9 GRAMS
BH CV ECHO MEAS - LV MAX PG: 7.9 MMHG
BH CV ECHO MEAS - LV MEAN PG: 4.3 MMHG
BH CV ECHO MEAS - LV V1 MAX: 140.1 CM/SEC
BH CV ECHO MEAS - LV V1 VTI: 32 CM
BH CV ECHO MEAS - LVIDD: 4.4 CM
BH CV ECHO MEAS - LVIDS: 3.2 CM
BH CV ECHO MEAS - LVOT AREA: 2.5 CM2
BH CV ECHO MEAS - LVOT DIAM: 1.79 CM
BH CV ECHO MEAS - LVPWD: 0.87 CM
BH CV ECHO MEAS - MR MAX PG: 73.3 MMHG
BH CV ECHO MEAS - MR MAX VEL: 428.1 CM/SEC
BH CV ECHO MEAS - MV A MAX VEL: 97.1 CM/SEC
BH CV ECHO MEAS - MV DEC SLOPE: 552.1 CM/SEC2
BH CV ECHO MEAS - MV DEC TIME: 0.18 SEC
BH CV ECHO MEAS - MV E MAX VEL: 102 CM/SEC
BH CV ECHO MEAS - MV E/A: 1.05
BH CV ECHO MEAS - MV MAX PG: 5.7 MMHG
BH CV ECHO MEAS - MV MEAN PG: 2.7 MMHG
BH CV ECHO MEAS - MV V2 VTI: 30.4 CM
BH CV ECHO MEAS - MVA(VTI): 2.6 CM2
BH CV ECHO MEAS - PA ACC TIME: 0.12 SEC
BH CV ECHO MEAS - PI END-D VEL: 96.5 CM/SEC
BH CV ECHO MEAS - PULM A REVS DUR: 0.11 SEC
BH CV ECHO MEAS - PULM A REVS VEL: 32.7 CM/SEC
BH CV ECHO MEAS - PULM DIAS VEL: 48.3 CM/SEC
BH CV ECHO MEAS - PULM S/D: 1.18
BH CV ECHO MEAS - PULM SYS VEL: 57 CM/SEC
BH CV ECHO MEAS - RAP SYSTOLE: 3 MMHG
BH CV ECHO MEAS - RV MAX PG: 3.2 MMHG
BH CV ECHO MEAS - RV V1 MAX: 88.8 CM/SEC
BH CV ECHO MEAS - RV V1 VTI: 19 CM
BH CV ECHO MEAS - RVDD: 2.47 CM
BH CV ECHO MEAS - RVSP: 24.8 MMHG
BH CV ECHO MEAS - SV(LVOT): 80 ML
BH CV ECHO MEAS - SV(MOD-SP4): 36.2 ML
BH CV ECHO MEAS - TR MAX PG: 21.8 MMHG
BH CV ECHO MEAS - TR MAX VEL: 233.1 CM/SEC

## 2024-01-29 PROCEDURE — 93356 MYOCRD STRAIN IMG SPCKL TRCK: CPT

## 2024-01-29 PROCEDURE — 99214 OFFICE O/P EST MOD 30 MIN: CPT | Performed by: INTERNAL MEDICINE

## 2024-01-29 PROCEDURE — 93306 TTE W/DOPPLER COMPLETE: CPT

## 2024-01-29 PROCEDURE — 93306 TTE W/DOPPLER COMPLETE: CPT | Performed by: INTERNAL MEDICINE

## 2024-01-29 PROCEDURE — 1160F RVW MEDS BY RX/DR IN RCRD: CPT | Performed by: INTERNAL MEDICINE

## 2024-01-29 PROCEDURE — 93356 MYOCRD STRAIN IMG SPCKL TRCK: CPT | Performed by: INTERNAL MEDICINE

## 2024-01-29 PROCEDURE — 1159F MED LIST DOCD IN RCRD: CPT | Performed by: INTERNAL MEDICINE

## 2024-02-08 RX ORDER — DILTIAZEM HYDROCHLORIDE 240 MG/1
240 CAPSULE, COATED, EXTENDED RELEASE ORAL
Qty: 90 CAPSULE | Refills: 2 | Status: SHIPPED | OUTPATIENT
Start: 2024-02-08

## 2024-02-08 NOTE — TELEPHONE ENCOUNTER
"Caller: eHlen Rousseau \"Val\"    Relationship: Self    Best call back number:     [phone]       Requested Prescriptions:   Requested Prescriptions     Pending Prescriptions Disp Refills    dilTIAZem CD (CARDIZEM CD) 240 MG 24 hr capsule 14 capsule 0     Sig: Take 1 capsule by mouth Daily.    apixaban (ELIQUIS) 5 MG tablet tablet 28 tablet 0     Sig: Take 1 tablet by mouth Every 12 (Twelve) Hours. Indications: Atrial Fibrillation        Pharmacy where request should be sent: [facility]/PHARMACY #[id number] - [place], [place] - [address] - [phone]  - [phone] FX     Last office visit with prescribing clinician: 1/9/2024   Last telemedicine visit with prescribing clinician: Visit date not found   Next office visit with prescribing clinician: 12/3/2024     Additional details provided by patient: PATIENT STILL HAS NOT RECEIVED HER MEDICATION FROM [facility]. SHE IS ALL OUT OF MEDICATION. PATIENT IS NEEDING AT LEAST A 10 DAY SUPPLY. ALSO IF THERE ARE SAMPLES AVAILABLE SHE WOULD BE OKAY WITH THAT AS WELL.    Does the patient have less than a 3 day supply:  [x] Yes  [] No    Would you like a call back once the refill request has been completed: [x] Yes [] No    If the office needs to give you a call back, can they leave a voicemail: [x] Yes [] No    Krystle Chaves Rep   02/08/24 09:01 EST           "

## 2024-02-28 NOTE — PROGRESS NOTES
Encounter Date:03/11/2024  Last seen 1/29/2024      Patient ID: Helen Rousseau is a 78 y.o. female.    Chief complaint  Atrial fibrillation  Chronic anticoagulation  Dyslipidemia  Hypothyroidism     History of present illness  Since I have last seen, the patient has been without any chest discomfort ,shortness of breath, palpitations, dizziness or syncope.  Denies having any headache ,abdominal pain ,nausea, vomiting , diarrhea constipation, loss of weight or loss of appetite.  Denies having any excessive bruising ,hematuria or blood in the stool.    Review of all systems negative except as indicated.    Reviewed ROS.    ]]]]]]]]]]]]]]]]]]]]]]  History  =============   - Atrial fibrillation.  Patient has converted with intravenous Cardizem and maintaining sinus rhythm.     Echocardiogram 1/29/2024     Mild mitral regurgitation.  Moderate tricuspid regurgitation.  Mild left atrial enlargement.  No evidence for pulmonary hypertension.  Normal systolic longitudinal left ventricular strain at -20%.  Normal diastolic ventricular function.  Left ventricular size and contractility is normal with ejection fraction of 60%.     Stress Cardiolite test  Normal-12/24/2023     - History of COVID     -Dyslipidemia hypothyroidism pantoprazole     - Allergic to atorvastatin pravastatin simvastatin sulfamethizole trimethoprim.     - Non-smoker.  ============  Plan  =============  History of new onset atrial fibrillation.  Patient was cardioverted with IV amiodarone.  Continue p.o. Cardizem.  EKG 3/11/2024-sinus rhythm     Anticoagulation.  [id number] score is 3  Continue Eliquis.  Observe for toxic effects of medication.     Echocardiogram 1/29/2024-as above     Hypothyroidism-on levothyroxine     Medications were reviewed and updated.    Patient is on Eliquis diltiazem levothyroxine     Further plan will depend on patient's progress.     Follow-up in the office in 6 months with EKG.    Reviewed and  updated-3/11/2024.  ]]]]]]]]]]]]]]]]]]]]          Diagnosis Plan   1. AF (paroxysmal atrial fibrillation)        2. Chronic anticoagulation        3. Hypothyroidism (acquired)        LAB RESULTS (LAST 7 DAYS)    CBC        BMP        CMP         BNP        TROPONIN        CoAg        Creatinine Clearance  CrCl cannot be calculated (Patient's most recent lab result is older than the maximum 30 days allowed.).    ABG        Radiology  No radiology results for the last day                The following portions of the patient's history were reviewed and updated as appropriate: allergies, current medications, past family history, past medical history, past social history, past surgical history, and problem list.    Review of Systems   Constitutional: Negative for malaise/fatigue.   Cardiovascular:  Positive for leg swelling. Negative for chest pain, dyspnea on exertion and palpitations.   Respiratory:  Negative for cough and shortness of breath.    Gastrointestinal:  Negative for abdominal pain, nausea and vomiting.   Neurological:  Positive for dizziness and light-headedness. Negative for focal weakness, headaches and numbness.   All other systems reviewed and are negative.      Current Outpatient Medications:     apixaban (ELIQUIS) 5 MG tablet tablet, Take 1 tablet by mouth Every 12 (Twelve) Hours. Indications: Atrial Fibrillation, Disp: 180 tablet, Rfl: 2    aspirin 81 MG tablet, 1 tablet., Disp: , Rfl:     bisacodyl (DULCOLAX) 5 MG EC tablet, Take 1 tablet by mouth Daily As Needed for Constipation., Disp: , Rfl:     calcium (OS-ARMANDO) 600 MG tablet, TAKE ONE TABLET BY MOUTH EVERY DAY, Disp: 60 tablet, Rfl: 10    cetirizine (zyrTEC) 10 MG tablet, ZYRTEC ALLERGY 10 MG TABS, Disp: , Rfl:     coenzyme Q10 100 MG capsule, Take 1 capsule by mouth Daily., Disp: , Rfl:     dilTIAZem CD (CARDIZEM CD) 240 MG 24 hr capsule, Take 1 capsule by mouth Daily., Disp: 90 capsule, Rfl: 2    fluticasone (FLONASE) 50 MCG/ACT nasal spray, 2  sprays into the nostril(s) as directed by provider Daily., Disp: 18 mL, Rfl: 11    Garlic 1000 MG capsule, Take 1 capsule by mouth Daily., Disp: , Rfl:     liothyronine (CYTOMEL) 5 MCG tablet, TAKE 2 TABLETS DAILY, Disp: 180 tablet, Rfl: 1    Misc Natural Products (Neuriva) capsule, Take  by mouth., Disp: , Rfl:     Multiple Vitamin (MULTIVITAMIN) capsule, MULTIVITAMINS CAPS, Disp: , Rfl:     omeprazole (priLOSEC) 40 MG capsule, TAKE 1 CAPSULE DAILY, Disp: 90 capsule, Rfl: 1    ondansetron ODT (ZOFRAN-ODT) 4 MG disintegrating tablet, Place 1 tablet on the tongue Every 8 (Eight) Hours As Needed for Nausea or Vomiting., Disp: 30 tablet, Rfl: 0    Polyethylene Glycol 3350 (MIRALAX PO), Take 1 each by mouth 4 (Four) Times a Week., Disp: , Rfl:     potassium chloride 10 MEQ CR tablet, Take 1 tablet by mouth Daily., Disp: 90 tablet, Rfl: 2    promethazine (PHENERGAN) 25 MG tablet, Take 1 tablet by mouth Every 8 (Eight) Hours As Needed for Nausea or Vomiting., Disp: 30 tablet, Rfl: 0    Synthroid 50 MCG tablet, TAKE 1 TABLET DAILY, Disp: 90 tablet, Rfl: 1    Allergies   Allergen Reactions    Atorvastatin Calcium Myalgia    Pravastatin Sodium Myalgia    Simvastatin Myalgia    Sulfamethoxazole-Trimethoprim Rash       Family History   Problem Relation Age of Onset    Thyroid disease Mother         100 years bold    Diabetes Father         Diabetic    Hyperlipidemia Father         High blood pressure    Cancer Sister         Colon cancer    Hyperlipidemia Sister     Thyroid disease Sister     Diabetes Brother         Diabetic    Cancer Brother         Bladder cancer    Cancer Brother         Multiple Myeloma    Cancer Brother         Lung Cancer    Diabetes Brother         Diabetic.    Hyperlipidemia Brother     Hyperlipidemia Brother     Hyperlipidemia Brother     Kidney disease Sister     Thyroid disease Sister        Past Surgical History:   Procedure Laterality Date    BASAL CELL CARCINOMA EXCISION      nose     BUNIONECTOMY Right     COLONOSCOPY      2016- due 5 years    HYSTERECTOMY      TUBAL ABDOMINAL LIGATION  ??        Past Medical History:   Diagnosis Date    Actinic keratosis     Atrial fibrillation     Basal cell adenocarcinoma     Bell's palsy     Left    Chronic constipation     DDD (degenerative disc disease), cervical     DDD (degenerative disc disease), cervical     Edema     GERD (gastroesophageal reflux disease)     Headache     Hyperlipidemia     Hyperthyroidism 15/20 years ago    Hashimoto.    Hypothyroidism     Migraine headache     Mild carotid artery disease     Osteoarthritis     Statin-induced myositis     Urinary tract infection Have several a year       Family History   Problem Relation Age of Onset    Thyroid disease Mother         100 years bold    Diabetes Father         Diabetic    Hyperlipidemia Father         High blood pressure    Cancer Sister         Colon cancer    Hyperlipidemia Sister     Thyroid disease Sister     Diabetes Brother         Diabetic    Cancer Brother         Bladder cancer    Cancer Brother         Multiple Myeloma    Cancer Brother         Lung Cancer    Diabetes Brother         Diabetic.    Hyperlipidemia Brother     Hyperlipidemia Brother     Hyperlipidemia Brother     Kidney disease Sister     Thyroid disease Sister        Social History     Socioeconomic History    Marital status:    Tobacco Use    Smoking status: Former     Packs/day: 0.25     Years: 4.00     Additional pack years: 0.00     Total pack years: 1.00     Types: Cigarettes     Start date: 1963     Quit date: 1967     Years since quittin.1     Passive exposure: Past    Smokeless tobacco: Never    Tobacco comments:     2 to 4 cigarettes a day.   Vaping Use    Vaping Use: Never used   Substance and Sexual Activity    Alcohol use: Yes     Comment: Maybe a 2-4 ounce glass of wine 1 month.    Drug use: Never    Sexual activity: Yes     Partners: Male     Birth control/protection:  Post-menopausal, Hysterectomy           ECG 12 Lead    Date/Time: 3/11/2024 10:46 AM  Performed by: Brennan Andrews MD    Authorized by: Brennan Andrews MD  Comparison: compared with previous ECG   Similar to previous ECG  Comparison to previous ECG: Normal sinus rhythm normal ECG 75/min normal intervals no ectopy no significant change from previous EKG.        Objective:       Physical Exam    There were no vitals taken for this visit.  The patient is alert, oriented and in no distress.    Vital signs as noted above.    Head and neck revealed no carotid bruits or jugular venous distension.  No thyromegaly or lymphadenopathy is present.    Lungs clear.  No wheezing.  Breath sounds are normal bilaterally.    Heart normal first and second heart sounds.  No murmur..  No pericardial rub is present.  No gallop is present.    Abdomen soft and nontender.  No organomegaly is present.    Extremities revealed good peripheral pulses without any pedal edema.    Skin warm and dry.    Musculoskeletal system is grossly normal.    CNS grossly normal.    Reviewed and updated.

## 2024-03-11 ENCOUNTER — OFFICE VISIT (OUTPATIENT)
Dept: CARDIOLOGY | Facility: CLINIC | Age: 79
End: 2024-03-11
Payer: MEDICARE

## 2024-03-11 VITALS
OXYGEN SATURATION: 98 % | HEIGHT: 67 IN | WEIGHT: 129 LBS | HEART RATE: 68 BPM | SYSTOLIC BLOOD PRESSURE: 129 MMHG | BODY MASS INDEX: 20.25 KG/M2 | DIASTOLIC BLOOD PRESSURE: 68 MMHG

## 2024-03-11 DIAGNOSIS — Z79.01 CHRONIC ANTICOAGULATION: ICD-10-CM

## 2024-03-11 DIAGNOSIS — I48.0 AF (PAROXYSMAL ATRIAL FIBRILLATION): Primary | ICD-10-CM

## 2024-03-11 DIAGNOSIS — E03.9 HYPOTHYROIDISM (ACQUIRED): ICD-10-CM

## 2024-03-11 PROCEDURE — 93000 ELECTROCARDIOGRAM COMPLETE: CPT | Performed by: INTERNAL MEDICINE

## 2024-03-11 PROCEDURE — 1160F RVW MEDS BY RX/DR IN RCRD: CPT | Performed by: INTERNAL MEDICINE

## 2024-03-11 PROCEDURE — 1159F MED LIST DOCD IN RCRD: CPT | Performed by: INTERNAL MEDICINE

## 2024-03-11 PROCEDURE — 99214 OFFICE O/P EST MOD 30 MIN: CPT | Performed by: INTERNAL MEDICINE

## 2024-03-11 RX ORDER — ESTRADIOL 0.1 MG/G
CREAM VAGINAL
COMMUNITY
Start: 2024-02-16

## 2024-03-19 ENCOUNTER — TELEPHONE (OUTPATIENT)
Dept: FAMILY MEDICINE CLINIC | Facility: CLINIC | Age: 79
End: 2024-03-19
Payer: MEDICARE

## 2024-03-19 RX ORDER — LIOTHYRONINE SODIUM 5 UG/1
TABLET ORAL
Qty: 28 TABLET | Refills: 0 | Status: SHIPPED | OUTPATIENT
Start: 2024-03-19

## 2024-03-19 NOTE — TELEPHONE ENCOUNTER
"    Caller: Helen Rousseau \"Val\"    Relationship: Self    Best call back number: [phone]     What medication are you requesting: liothyronine (CYTOMEL) 5 MCG tablet     If a prescription is needed, what is your preferred pharmacy and phone number: [facility] [address] [phone] [facility] [phone]      Additional notes: PATIENT STATED SHE HAS BEEN OUT OF THIS MEDICATION FOR 4 DAYS AND THE MAIL IN PHARMACY TOLD PATIENT IT WILL BE ANOTHER WEEK OR TWO BEFORE SHE WILL RECEIVE THIS PRESCRIPTION. PATIENT IS REQUESTING FOR A 14 DAY SUPPLY OF THIS MEDICATION TO HOLD HER OVER UNTIL THE MAIL ORDER PRESCRIPTION ARRIVES    PLEASE ADVISE    "

## 2024-03-26 RX ORDER — LIOTHYRONINE SODIUM 5 UG/1
TABLET ORAL
Qty: 180 TABLET | Refills: 2 | Status: SHIPPED | OUTPATIENT
Start: 2024-03-26

## 2024-03-26 RX ORDER — POTASSIUM CHLORIDE 750 MG/1
10 TABLET, FILM COATED, EXTENDED RELEASE ORAL DAILY
Qty: 90 TABLET | Refills: 2 | Status: SHIPPED | OUTPATIENT
Start: 2024-03-26

## 2024-03-26 NOTE — TELEPHONE ENCOUNTER
"90 DAY SUPPLY PLEASE         Caller:       Helen Rousseau \"Val\" (Self) [phone] (Mobile)       Requested Prescriptions:   Requested Prescriptions     Pending Prescriptions Disp Refills    potassium chloride 10 MEQ CR tablet 90 tablet 2     Sig: Take 1 tablet by mouth Daily.    liothyronine (CYTOMEL) 5 MCG tablet 28 tablet 0     Sig: TAKE 2 TABLETS DAILY        Pharmacy where request should be sent: [address] [phone] [facility] [phone]      Last office visit with prescribing clinician: 1/9/2024   Last telemedicine visit with prescribing clinician: Visit date not found   Next office visit with prescribing clinician: 12/3/2024     Additional details provided by patient:     Does the patient have less than a 3 day supply:  [] Yes  [] No    Would you like a call back once the refill request has been completed: [] Yes [] No    If the office needs to give you a call back, can they leave a voicemail: [] Yes [] No    Krystle Wilson   03/26/24 11:37 EDT             "

## 2024-05-14 DIAGNOSIS — K21.9 GASTROESOPHAGEAL REFLUX DISEASE, UNSPECIFIED WHETHER ESOPHAGITIS PRESENT: Primary | ICD-10-CM

## 2024-05-14 RX ORDER — OMEPRAZOLE 40 MG/1
40 CAPSULE, DELAYED RELEASE ORAL DAILY
Qty: 90 CAPSULE | Refills: 1 | Status: SHIPPED | OUTPATIENT
Start: 2024-05-14

## 2024-05-21 ENCOUNTER — OFFICE VISIT (OUTPATIENT)
Dept: FAMILY MEDICINE CLINIC | Facility: CLINIC | Age: 79
End: 2024-05-21
Payer: MEDICARE

## 2024-05-21 VITALS
HEIGHT: 67 IN | OXYGEN SATURATION: 97 % | BODY MASS INDEX: 20.25 KG/M2 | HEART RATE: 76 BPM | DIASTOLIC BLOOD PRESSURE: 70 MMHG | WEIGHT: 129 LBS | TEMPERATURE: 97.5 F | SYSTOLIC BLOOD PRESSURE: 105 MMHG

## 2024-05-21 DIAGNOSIS — I48.91 ATRIAL FIBRILLATION, UNSPECIFIED TYPE: ICD-10-CM

## 2024-05-21 DIAGNOSIS — T14.8XXA HEMATOMA: ICD-10-CM

## 2024-05-21 DIAGNOSIS — R82.81 PYURIA: Primary | ICD-10-CM

## 2024-05-21 LAB
BILIRUB BLD-MCNC: NEGATIVE MG/DL
CLARITY, POC: ABNORMAL
COLOR UR: YELLOW
EXPIRATION DATE: ABNORMAL
GLUCOSE UR STRIP-MCNC: NEGATIVE MG/DL
KETONES UR QL: NEGATIVE
LEUKOCYTE EST, POC: ABNORMAL
Lab: ABNORMAL
NITRITE UR-MCNC: POSITIVE MG/ML
PH UR: 6 [PH] (ref 5–8)
PROT UR STRIP-MCNC: NEGATIVE MG/DL
RBC # UR STRIP: NEGATIVE /UL
SP GR UR: 1.01 (ref 1–1.03)
UROBILINOGEN UR QL: NORMAL

## 2024-05-21 PROCEDURE — 1126F AMNT PAIN NOTED NONE PRSNT: CPT | Performed by: FAMILY MEDICINE

## 2024-05-21 PROCEDURE — 87186 SC STD MICRODIL/AGAR DIL: CPT | Performed by: FAMILY MEDICINE

## 2024-05-21 PROCEDURE — 87077 CULTURE AEROBIC IDENTIFY: CPT | Performed by: FAMILY MEDICINE

## 2024-05-21 PROCEDURE — 1159F MED LIST DOCD IN RCRD: CPT | Performed by: FAMILY MEDICINE

## 2024-05-21 PROCEDURE — 87086 URINE CULTURE/COLONY COUNT: CPT | Performed by: FAMILY MEDICINE

## 2024-05-21 PROCEDURE — 1160F RVW MEDS BY RX/DR IN RCRD: CPT | Performed by: FAMILY MEDICINE

## 2024-05-21 PROCEDURE — 99213 OFFICE O/P EST LOW 20 MIN: CPT | Performed by: FAMILY MEDICINE

## 2024-05-21 PROCEDURE — 81003 URINALYSIS AUTO W/O SCOPE: CPT | Performed by: FAMILY MEDICINE

## 2024-05-21 RX ORDER — CEFDINIR 300 MG/1
300 CAPSULE ORAL 2 TIMES DAILY
Qty: 14 CAPSULE | Refills: 0 | Status: SHIPPED | OUTPATIENT
Start: 2024-05-21 | End: 2024-05-28

## 2024-05-21 NOTE — PROGRESS NOTES
Subjective   Helen Rousseau is a 78 y.o. female.     History of Present Illness  The patient comes in today with complaints of a knot on her hip.    The patient experienced a fall in her garage over a week ago resulting in a palpable mass on her left hip. The mass was initially large, but has since decreased in size and is associated with tenderness. The initial fall was due to a loss of balance while moving past an item in the floor. She sustained injuries to her elbow and hip, resulting in a few scabs. Despite the fall, she was able to ambulate independently. The affected area was excessively warm to touch during the initial two days post-fall, prompting the application of ice. She expresses concern about a potential blood clot.    The patient reports experiencing cloudy urine, but denies any dysuria, nausea, vomiting, or pain.    The patient was initiated on diltiazem by her cardiologist. She denies experiencing any chest pain, but does report dry mouth and edema.  She was uncertain if she should discuss this with me or cardiology.     She is allergic to SULFA DRUGS.       The following portions of the patient's history were reviewed and updated as appropriate: allergies, current medications, past family history, past medical history, past social history, past surgical history, and problem list.  Past Medical History:   Diagnosis Date    Actinic keratosis     Atrial fibrillation     Basal cell adenocarcinoma     Bell's palsy     Left    Chronic constipation     DDD (degenerative disc disease), cervical     DDD (degenerative disc disease), cervical     Edema     GERD (gastroesophageal reflux disease) 2015    Headache     Hyperlipidemia     Hyperthyroidism 15/20 years ago    Hashimoto.    Hypothyroidism     Migraine headache     Mild carotid artery disease     Osteoarthritis     Statin-induced myositis     Urinary tract infection Have several a year     Past Surgical History:   Procedure Laterality Date    BASAL  CELL CARCINOMA EXCISION      nose    BUNIONECTOMY Right     COLONOSCOPY      2016- due 5 years    HYSTERECTOMY      TUBAL ABDOMINAL LIGATION  ??      Family History   Problem Relation Age of Onset    Thyroid disease Mother         100 years bold    Diabetes Father         Diabetic    Hyperlipidemia Father         High blood pressure    Cancer Sister         Colon cancer    Hyperlipidemia Sister     Thyroid disease Sister     Diabetes Brother         Diabetic    Cancer Brother         Bladder cancer    Cancer Brother         Multiple Myeloma    Cancer Brother         Lung Cancer    Diabetes Brother         Diabetic.    Hyperlipidemia Brother     Hyperlipidemia Brother     Hyperlipidemia Brother     Kidney disease Sister     Thyroid disease Sister      Social History     Socioeconomic History    Marital status:    Tobacco Use    Smoking status: Former     Current packs/day: 0.00     Average packs/day: 0.3 packs/day for 4.0 years (1.0 ttl pk-yrs)     Types: Cigarettes     Start date: 1963     Quit date: 1967     Years since quittin.4     Passive exposure: Past    Smokeless tobacco: Never    Tobacco comments:     2 to 4 cigarettes a day.   Vaping Use    Vaping status: Never Used   Substance and Sexual Activity    Alcohol use: Yes     Comment: Maybe a 2-4 ounce glass of wine 1 month.    Drug use: Never    Sexual activity: Yes     Partners: Male     Birth control/protection: Post-menopausal, Hysterectomy         Current Outpatient Medications:     apixaban (ELIQUIS) 5 MG tablet tablet, Take 1 tablet by mouth Every 12 (Twelve) Hours. Indications: Atrial Fibrillation, Disp: 180 tablet, Rfl: 2    bisacodyl (DULCOLAX) 5 MG EC tablet, Take 1 tablet by mouth Daily As Needed for Constipation., Disp: , Rfl:     calcium (OS-ARMANDO) 600 MG tablet, TAKE ONE TABLET BY MOUTH EVERY DAY, Disp: 60 tablet, Rfl: 10    cetirizine (zyrTEC) 10 MG tablet, ZYRTEC ALLERGY 10 MG TABS, Disp: , Rfl:     coenzyme Q10 100 MG  "capsule, Take 1 capsule by mouth Daily., Disp: , Rfl:     dilTIAZem CD (CARDIZEM CD) 240 MG 24 hr capsule, Take 1 capsule by mouth Daily., Disp: 90 capsule, Rfl: 2    estradiol (ESTRACE) 0.1 MG/GM vaginal cream, , Disp: , Rfl:     fluticasone (FLONASE) 50 MCG/ACT nasal spray, 2 sprays into the nostril(s) as directed by provider Daily., Disp: 18 mL, Rfl: 11    Garlic 1000 MG capsule, Take 1 capsule by mouth Daily., Disp: , Rfl:     liothyronine (CYTOMEL) 5 MCG tablet, TAKE 2 TABLETS DAILY, Disp: 180 tablet, Rfl: 2    Misc Natural Products (Neuriva) capsule, Take  by mouth., Disp: , Rfl:     Multiple Vitamin (MULTIVITAMIN) capsule, MULTIVITAMINS CAPS, Disp: , Rfl:     omeprazole (priLOSEC) 40 MG capsule, TAKE 1 CAPSULE DAILY, Disp: 90 capsule, Rfl: 1    potassium chloride 10 MEQ CR tablet, Take 1 tablet by mouth Daily., Disp: 90 tablet, Rfl: 2    Synthroid 50 MCG tablet, TAKE 1 TABLET DAILY, Disp: 90 tablet, Rfl: 1    cefdinir (OMNICEF) 300 MG capsule, Take 1 capsule by mouth 2 (Two) Times a Day for 7 days., Disp: 14 capsule, Rfl: 0    ondansetron ODT (ZOFRAN-ODT) 4 MG disintegrating tablet, Place 1 tablet on the tongue Every 8 (Eight) Hours As Needed for Nausea or Vomiting. (Patient not taking: Reported on 5/21/2024), Disp: 30 tablet, Rfl: 0    Polyethylene Glycol 3350 (MIRALAX PO), Take 1 each by mouth 4 (Four) Times a Week. (Patient not taking: Reported on 5/21/2024), Disp: , Rfl:     promethazine (PHENERGAN) 25 MG tablet, Take 1 tablet by mouth Every 8 (Eight) Hours As Needed for Nausea or Vomiting. (Patient not taking: Reported on 5/21/2024), Disp: 30 tablet, Rfl: 0    Review of Systems  ROS done and noted in HPI    /70 (BP Location: Left arm, Patient Position: Sitting, Cuff Size: Adult)   Pulse 76   Temp 97.5 °F (36.4 °C) (Temporal)   Ht 170.2 cm (67\")   Wt 58.5 kg (129 lb)   SpO2 97%   BMI 20.20 kg/m²   BMI is within normal parameters. No other follow-up for BMI required.       Objective   Physical " Exam  Vitals and nursing note reviewed.   Constitutional:       Appearance: Normal appearance. She is normal weight.   Cardiovascular:      Rate and Rhythm: Normal rate and regular rhythm.      Heart sounds: Normal heart sounds.   Pulmonary:      Effort: Pulmonary effort is normal.      Breath sounds: Normal breath sounds.   Abdominal:      General: Abdomen is flat. Bowel sounds are normal.      Palpations: Abdomen is soft.      Tenderness: There is no abdominal tenderness. There is no right CVA tenderness or left CVA tenderness.   Musculoskeletal:      Right lower leg: Edema present.      Left lower leg: Edema present.      Comments: Left hip- full ROM; mild tenderness over a small hematoma along the post aspect  Normal ambulation   Neurological:      Mental Status: She is alert.       Physical Exam         Results       Brief Urine Lab Results  (Last result in the past 365 days)        Color   Clarity   Blood   Leuk Est   Nitrite   Protein   CREAT   Urine HCG        05/21/24 1146 Yellow   Turbid   Negative   Large (3+)   Positive   Negative                     Assessment & Plan   Problems Addressed this Visit          Cardiac and Vasculature    Atrial fibrillation     Other Visit Diagnoses       Pyuria    -  Primary    Relevant Orders    POCT urinalysis dipstick, automated (Completed)    Urine Culture - Urine, Urine, Clean Catch    Hematoma              Diagnoses         Codes Comments    Pyuria    -  Primary ICD-10-CM: R82.81  ICD-9-CM: 791.9     Hematoma     ICD-10-CM: T14.8XXA  ICD-9-CM: 924.9     Atrial fibrillation, unspecified type     ICD-10-CM: I48.91  ICD-9-CM: 427.31           Assessment & Plan  1. Hematoma of the left hip.  The hematoma is likely a result of a blood collection. The patient is advised to apply a heating pad, warm compresses.  I offered an x-ray but she nor I feel there is a fracture present    2. Urinary tract infection.  Omnicef will be prescribed for the patient and the urine was sent  for culture.    3. Atrial fibrillation.  The patient is experiencing side effects of diltiazem, including leg swelling and dry mouth. The patient is advised to consult with her cardiologist regarding the use of diltiazem.            Patient or patient representative verbalized consent for the use of Ambient Listening during the visit with  Maile Osorio MD for chart documentation. 5/21/2024  12:07 EDT

## 2024-05-22 ENCOUNTER — TELEPHONE (OUTPATIENT)
Dept: CARDIOLOGY | Facility: CLINIC | Age: 79
End: 2024-05-22
Payer: MEDICARE

## 2024-05-22 RX ORDER — METOPROLOL SUCCINATE 25 MG/1
25 TABLET, EXTENDED RELEASE ORAL DAILY
Qty: 90 TABLET | Refills: 3 | Status: SHIPPED | OUTPATIENT
Start: 2024-05-22

## 2024-05-22 NOTE — TELEPHONE ENCOUNTER
Patient has been on diltiazem since Christmas. She has been having trouble with dry mouth(dentist told her it could be from medication) Cramps in legs, legs and arms feel tired all the time. Swelling in legs. Asking if she can stop taking diltiazem?

## 2024-05-22 NOTE — TELEPHONE ENCOUNTER
Patient has history of atrial fibrillation.  She may go back into A-fib if we stop Cardizem.  Start metoprolol XL 25 mg a day.  May discontinue diltiazem and observe.

## 2024-05-22 NOTE — TELEPHONE ENCOUNTER
Patient is wanting to stop the Diltiazem due to side effects of the medication. Side effects include dry mouth, leg cramps and fatigue.     Please advise if this is okay to do.

## 2024-05-22 NOTE — TELEPHONE ENCOUNTER
Spoke to patient and patient understands.    Rx Refill Note  Requested Prescriptions     Pending Prescriptions Disp Refills    metoprolol succinate XL (TOPROL-XL) 25 MG 24 hr tablet 90 tablet 3     Sig: Take 1 tablet by mouth Daily.      Last office visit with prescribing clinician: 3/11/2024   Last telemedicine visit with prescribing clinician: Visit date not found   Next office visit with prescribing clinician: 9/23/2024                         Would you like a call back once the refill request has been completed: [] Yes [] No    If the office needs to give you a call back, can they leave a voicemail: [] Yes [] No    Sharmila Ayala MA  05/22/24, 13:16 EDT

## 2024-05-23 LAB — BACTERIA SPEC AEROBE CULT: ABNORMAL

## 2024-05-23 NOTE — TELEPHONE ENCOUNTER
Spoke to patient and patient is concerned about the possible side effects of Metoprolol such as low blood pressure, low heart rate. Patient states that she does not want to take any medication that she does not need. I advised patient that per your last message to stop taking Diltiazem but take Metoprolol due to patient having a history of atrial fibrillation. Patient understood.     Blood pressure yesterday was 105/75

## 2024-05-23 NOTE — PROGRESS NOTES
Left message to return call to doctor's office at [facility]. Either to get test results or message from provider.

## 2024-05-23 NOTE — TELEPHONE ENCOUNTER
PT CALLED BACK, STATES SHE WOULD LIKE TO SPEAK BACK WITH MA OR CLINICAL TO DISCUSS THE METOPROLOL AS SHE IS READING UP ON THE SIDE EFFECTS AND IS CONCERNED. PLEASE REACH BACK OUT WHEN YOU CAN.

## 2024-06-07 ENCOUNTER — TELEPHONE (OUTPATIENT)
Dept: FAMILY MEDICINE CLINIC | Facility: CLINIC | Age: 79
End: 2024-06-07

## 2024-06-07 DIAGNOSIS — R82.90 CLOUDY URINE: Primary | ICD-10-CM

## 2024-06-07 NOTE — TELEPHONE ENCOUNTER
"  Caller: Helen Rousseau \"Val\"    Relationship: Self    Best call back number: [phone]     What orders are you requesting (i.e. lab or imaging): URINALYSIS    In what timeframe would the patient need to come in: MONDAY 6/10    Where will you receive your lab/imaging services: Mobile OFFICE    Additional notes: PATIENT IS REQUESTING TO KNOW IF SHE CAN HAVE A URINALYSIS DONE. PATIENT IS HAVING CLOUDY URINE AND THINKS SHE MAY HAVE A UTI     PLEASE ADVISE      "

## 2024-06-10 ENCOUNTER — LAB (OUTPATIENT)
Dept: FAMILY MEDICINE CLINIC | Facility: CLINIC | Age: 79
End: 2024-06-10
Payer: MEDICARE

## 2024-06-10 DIAGNOSIS — R82.90 CLOUDY URINE: ICD-10-CM

## 2024-06-10 PROCEDURE — 87086 URINE CULTURE/COLONY COUNT: CPT | Performed by: FAMILY MEDICINE

## 2024-06-10 PROCEDURE — 81001 URINALYSIS AUTO W/SCOPE: CPT | Performed by: FAMILY MEDICINE

## 2024-06-10 PROCEDURE — 87186 SC STD MICRODIL/AGAR DIL: CPT

## 2024-06-10 PROCEDURE — 87077 CULTURE AEROBIC IDENTIFY: CPT | Performed by: FAMILY MEDICINE

## 2024-06-11 RX ORDER — CEFDINIR 300 MG/1
300 CAPSULE ORAL 2 TIMES DAILY
Qty: 14 CAPSULE | Refills: 0 | Status: SHIPPED | OUTPATIENT
Start: 2024-06-11 | End: 2024-06-18

## 2024-06-13 ENCOUNTER — TELEPHONE (OUTPATIENT)
Dept: FAMILY MEDICINE CLINIC | Facility: CLINIC | Age: 79
End: 2024-06-13

## 2024-06-13 LAB — BACTERIA SPEC AEROBE CULT: ABNORMAL

## 2024-06-13 RX ORDER — NITROFURANTOIN 25; 75 MG/1; MG/1
100 CAPSULE ORAL 2 TIMES DAILY
Qty: 14 CAPSULE | Refills: 0 | Status: SHIPPED | OUTPATIENT
Start: 2024-06-13 | End: 2024-06-20

## 2024-06-13 RX ORDER — LEVOTHYROXINE SODIUM 50 MCG
50 TABLET ORAL DAILY
Qty: 90 TABLET | Refills: 1 | Status: SHIPPED | OUTPATIENT
Start: 2024-06-13

## 2024-06-13 RX ORDER — NITROFURANTOIN 25; 75 MG/1; MG/1
100 CAPSULE ORAL 2 TIMES DAILY
Qty: 14 CAPSULE | Refills: 0 | Status: SHIPPED | OUTPATIENT
Start: 2024-06-13 | End: 2024-06-13 | Stop reason: SDUPTHER

## 2024-06-13 NOTE — TELEPHONE ENCOUNTER
"  Caller: Helen Rousseau \"Val\"    Relationship: Self    Best call back number:     Helen Rousseau \"Val\" (Self) [phone] (Mobile)       What was the call regarding: PATIENT IS NEEDING HER PRESCRIPTIONS TO GO TO [facility] AND NOT [facility]     CAN YOU SEND HER LAST MEDS TO [facility]    Is it okay if the provider responds through MyChart:  "

## 2024-06-19 RX ORDER — LIOTHYRONINE SODIUM 5 UG/1
TABLET ORAL
Qty: 180 TABLET | Refills: 1 | Status: SHIPPED | OUTPATIENT
Start: 2024-06-19

## 2024-07-08 RX ORDER — LEVOTHYROXINE SODIUM 0.05 MG/1
50 TABLET ORAL DAILY
Qty: 90 TABLET | Refills: 1 | Status: SHIPPED | OUTPATIENT
Start: 2024-07-08

## 2024-07-08 NOTE — TELEPHONE ENCOUNTER
"    Caller: Helen Rousseau \"Val\"    Relationship: Self    Best call back number: [phone]     Requested Prescriptions:   Requested Prescriptions     Pending Prescriptions Disp Refills    levothyroxine (Synthroid) 50 MCG tablet 90 tablet 1     Sig: Take 1 tablet by mouth Daily.        Pharmacy where request should be sent: [address] [phone] [facility] [phone]      Last office visit with prescribing clinician: 5/21/2024   Last telemedicine visit with prescribing clinician: Visit date not found   Next office visit with prescribing clinician: 12/3/2024     Additional details provided by patient:     Does the patient have less than a 3 day supply:  [] Yes  [] No    Would you like a call back once the refill request has been completed: [] Yes [] No    If the office needs to give you a call back, can they leave a voicemail: [] Yes [] No    April Krystle Guo Rep   07/08/24 08:31 EDT           "

## 2024-08-06 ENCOUNTER — OFFICE VISIT (OUTPATIENT)
Dept: PODIATRY | Facility: CLINIC | Age: 79
End: 2024-08-06
Payer: MEDICARE

## 2024-08-06 VITALS
OXYGEN SATURATION: 96 % | HEIGHT: 67 IN | BODY MASS INDEX: 20.25 KG/M2 | RESPIRATION RATE: 20 BRPM | WEIGHT: 129 LBS | HEART RATE: 89 BPM

## 2024-08-06 DIAGNOSIS — L60.3 NAIL DYSTROPHY: ICD-10-CM

## 2024-08-06 DIAGNOSIS — M25.472 LEFT ANKLE SWELLING: Primary | ICD-10-CM

## 2024-08-06 DIAGNOSIS — I87.2 VENOUS INSUFFICIENCY OF LEFT LEG: ICD-10-CM

## 2024-08-06 DIAGNOSIS — L85.2 PUNCTATE KERATOSIS: ICD-10-CM

## 2024-08-06 DIAGNOSIS — M20.41 HAMMERTOE OF RIGHT FOOT: ICD-10-CM

## 2024-08-06 DIAGNOSIS — G89.29 CHRONIC TOE PAIN, RIGHT FOOT: ICD-10-CM

## 2024-08-06 DIAGNOSIS — L90.9 FAT PAD ATROPHY OF FOOT: ICD-10-CM

## 2024-08-06 DIAGNOSIS — M79.674 CHRONIC TOE PAIN, RIGHT FOOT: ICD-10-CM

## 2024-08-06 DIAGNOSIS — L84 CALLUS OF FOOT: ICD-10-CM

## 2024-08-06 PROCEDURE — 11055 PARING/CUTG B9 HYPRKER LES 1: CPT

## 2024-08-06 PROCEDURE — 99203 OFFICE O/P NEW LOW 30 MIN: CPT

## 2024-08-06 PROCEDURE — 1160F RVW MEDS BY RX/DR IN RCRD: CPT

## 2024-08-06 PROCEDURE — 1159F MED LIST DOCD IN RCRD: CPT

## 2024-08-06 NOTE — PROGRESS NOTES
08/06/2024  Foot and Ankle Surgery - New Patient   Provider: ISAURA Camarena   Location: [facility]    Subjective:  Helen Rousseau is a 79 y.o. female.     Chief Complaint   Patient presents with    Right Foot - Initial Evaluation, Plantar Warts, Callouses, Nail Problem    Left Ankle - Initial Evaluation, Pain    Initial Evaluation     HOWARD Osorio last pcp visit        History of Present Illness  The patient is a 79-year-old female who is here today for multiple foot issues.    The patient sought dermatological care for a wart located on the plantar aspect of her right foot, which was subsequently treated with two cauterization procedures. Concurrently, she was advised to use a wart remover stick, which successfully removed the skin. An x-ray of her left ankle was also performed. Despite the absence of diabetes, she reports a history of bunionectomy on her right foot, which was subsequently removed due to a crooked toe, necessitating a second procedure. The second procedure involved the use of a stick for a duration of 6 weeks, which was subsequently removed, resulting in a hammertoe. Subsequently, she developed a fungal infection under her foot, for which she was prescribed a topical medication, which provided minimal relief. She experiences pain while walking barefoot, particularly on hard wooden floors, and is unable to walk without wearing shoes or socks. Over the past few years, the thickness of her toenail has significantly increased. She typically wears tennis shoes a few days a week and finds her favorite pair of Skechers.    The patient reports persistent swelling and discoloration in her left ankle, which is not associated with pain. She recalls an incident while cleaning a heavy chair on her deck, which resulted in a black discoloration to her toes. Despite attempts to alleviate the swelling with various massages, the swelling persists.       Allergies   Allergen Reactions     Atorvastatin Calcium Myalgia    Pravastatin Sodium Myalgia    Simvastatin Myalgia    Sulfamethoxazole-Trimethoprim Rash       Past Medical History:   Diagnosis Date    Actinic keratosis     Atrial fibrillation     Basal cell adenocarcinoma     Bell's palsy     Left    Chronic constipation     DDD (degenerative disc disease), cervical     DDD (degenerative disc disease), cervical     Edema     GERD (gastroesophageal reflux disease)     Headache     Hyperlipidemia     Hyperthyroidism 15/20 years ago    Hashimoto.    Hypothyroidism     Migraine headache     Mild carotid artery disease     Osteoarthritis     Statin-induced myositis     Urinary tract infection Have several a year       Past Surgical History:   Procedure Laterality Date    BASAL CELL CARCINOMA EXCISION      nose    BUNIONECTOMY Right     COLONOSCOPY      2016- due 5 years    HYSTERECTOMY      TUBAL ABDOMINAL LIGATION  ??        Family History   Problem Relation Age of Onset    Thyroid disease Mother         100 years bold    Diabetes Father         Diabetic    Hyperlipidemia Father         High blood pressure    Cancer Sister         Colon cancer    Hyperlipidemia Sister     Thyroid disease Sister     Diabetes Brother         Diabetic    Cancer Brother         Bladder cancer    Cancer Brother         Multiple Myeloma    Cancer Brother         Lung Cancer    Diabetes Brother         Diabetic.    Hyperlipidemia Brother     Hyperlipidemia Brother     Hyperlipidemia Brother     Kidney disease Sister     Thyroid disease Sister        Social History     Socioeconomic History    Marital status:    Tobacco Use    Smoking status: Former     Current packs/day: 0.00     Average packs/day: 0.3 packs/day for 4.0 years (1.0 ttl pk-yrs)     Types: Cigarettes     Start date: 1963     Quit date: 1967     Years since quittin.6     Passive exposure: Past    Smokeless tobacco: Never    Tobacco comments:     2 to 4 cigarettes a day.   Vaping Use     Vaping status: Never Used   Substance and Sexual Activity    Alcohol use: Yes     Comment: Maybe a 2-4 ounce glass of wine 1 month.    Drug use: Never    Sexual activity: Yes     Partners: Male     Birth control/protection: Post-menopausal, Hysterectomy        Current Outpatient Medications on File Prior to Visit   Medication Sig Dispense Refill    apixaban (ELIQUIS) 5 MG tablet tablet Take 1 tablet by mouth Every 12 (Twelve) Hours. Indications: Atrial Fibrillation 180 tablet 2    bisacodyl (DULCOLAX) 5 MG EC tablet Take 1 tablet by mouth Daily As Needed for Constipation.      calcium (OS-ARMANDO) 600 MG tablet TAKE ONE TABLET BY MOUTH EVERY DAY 60 tablet 10    cetirizine (zyrTEC) 10 MG tablet ZYRTEC ALLERGY 10 MG TABS      coenzyme Q10 100 MG capsule Take 1 capsule by mouth Daily.      dilTIAZem CD (CARDIZEM CD) 240 MG 24 hr capsule Take 1 capsule by mouth Daily. 90 capsule 2    estradiol (ESTRACE) 0.1 MG/GM vaginal cream       fluticasone (FLONASE) 50 MCG/ACT nasal spray 2 sprays into the nostril(s) as directed by provider Daily. 18 mL 11    Garlic 1000 MG capsule Take 1 capsule by mouth Daily.      levothyroxine (Synthroid) 50 MCG tablet Take 1 tablet by mouth Daily. 90 tablet 1    liothyronine (CYTOMEL) 5 MCG tablet TAKE 2 TABLETS DAILY 180 tablet 1    metoprolol succinate XL (TOPROL-XL) 25 MG 24 hr tablet Take 1 tablet by mouth Daily. 90 tablet 3    Misc Natural Products (Neuriva) capsule Take  by mouth.      Multiple Vitamin (MULTIVITAMIN) capsule MULTIVITAMINS CAPS      omeprazole (priLOSEC) 40 MG capsule TAKE 1 CAPSULE DAILY 90 capsule 1    ondansetron ODT (ZOFRAN-ODT) 4 MG disintegrating tablet Place 1 tablet on the tongue Every 8 (Eight) Hours As Needed for Nausea or Vomiting. 30 tablet 0    Polyethylene Glycol 3350 (MIRALAX PO) Take 1 each by mouth 4 (Four) Times a Week.      potassium chloride 10 MEQ CR tablet Take 1 tablet by mouth Daily. 90 tablet 2    promethazine (PHENERGAN) 25 MG tablet Take 1 tablet by  "mouth Every 8 (Eight) Hours As Needed for Nausea or Vomiting. 30 tablet 0     No current facility-administered medications on file prior to visit.           Objective   Pulse 89   Resp 20   Ht 170.2 cm (67\")   Wt 58.5 kg (129 lb)   SpO2 96%   BMI 20.20 kg/m²     Foot/Ankle Exam    GENERAL  Appearance:  appears stated age and elderly  Orientation:  AAOx3  Affect:  appropriate  Gait:  unimpaired  Assistance:  independent  Right shoe gear: sandal  Left shoe gear: sandal    VASCULAR     Right Foot Vascularity   Dorsalis pedis:  2+  Skin temperature:  warm  Edema grading:  Trace  CFT:  < 3 seconds  Pedal hair growth:  Absent  Varicosities:  mild varicosities     Left Foot Vascularity   Dorsalis pedis:  2+  Skin temperature:  warm  Edema grading:  Trace and 1+  CFT:  < 3 seconds  Pedal hair growth:  Absent  Varicosities:  mild varicosities     NEUROLOGIC     Right Foot Neurologic   Light touch sensation: normal     Left Foot Neurologic   Light touch sensation: normal    MUSCULOSKELETAL     Right Foot Musculoskeletal   Ecchymosis:  none  Tenderness:  callous right foot    Hammertoe:  Second toe     Left Foot Musculoskeletal   Ecchymosis:  none  Tenderness:  none    DERMATOLOGIC      Right Foot Dermatologic   Skin  Positive for corn and skin changes.   Nails  2.  Positive for abnormal thickness.     Left Foot Dermatologic   Skin  Positive for skin changes.   Physical Exam       Results  Imaging  X-ray of left ankle shows no bone chips or signs of fracture.       Assessment & Plan   Diagnoses and all orders for this visit:    1. Left ankle swelling (Primary)  -     Cancel: XR Ankle 3+ View Left  -     XR Ankle 3+ View Left    2. Venous insufficiency of left leg    3. Hammertoe of right foot    4. Chronic toe pain, right foot    5. Punctate keratosis    6. Callus of foot    7. Fat pad atrophy of foot      Assessment & Plan  On exam patient has callus to left foot as well as punctate keratosis to the right fifth " metatarsal head.  Punctate keratosis was pared down with curette x 1 today.  Patient describes pain to these areas with ambulating.  Discussed with patient pathophysiology of callus and punctate keratosis and importance of gentle exfoliation with urea-based moisturizer as well as offloading with over-the-counter arch support with metatarsal pad.  Instructed patient to avoid flats or sandals or barefoot walking.    Patient has concerns regarding right second toe nail appearance patient does have hammertoe with thickened toenail but does not look fungal to me advised patient that I do feel that this is from repetitive microtrauma which is caused thickening of the nail.    Patient concerned regarding left ankle swelling that has been chronic with some mild skin color changes.  X-ray was reviewed of the left ankle which showed no acute findings advised patient that I do feel that these changes are related to venous insufficiency pathophysiology and treatment options were discussed with patient and advised patient to proceed with leg elevation, light exercise and over-the-counter light compression stocking Tubigrip was given today to the patient.    Urea-based moisturizer was called into the compounding pharmacy but also gave patient options for over-the-counter purchase.    Follow-up  A follow-up appointment is scheduled for 4 months from now.      Orders Placed This Encounter   Procedures    XR Ankle 3+ View Left     Order Specific Question:   Reason for Exam:     Answer:   left ankle pain x 2 months. room 9.      Order Specific Question:   Does this patient have a diabetic monitoring/medication delivering device on?     Answer:   No     Order Specific Question:   Release to patient     Answer:   Routine Release [[id number]]          Patient or patient representative verbalized consent for the use of Ambient Listening during the visit with  ISAURA Mackey for chart documentation. 8/6/2024  13:46 EDT

## 2024-08-09 ENCOUNTER — PATIENT ROUNDING (BHMG ONLY) (OUTPATIENT)
Dept: ORTHOPEDIC SURGERY | Facility: CLINIC | Age: 79
End: 2024-08-09
Payer: MEDICARE

## 2024-08-09 NOTE — PROGRESS NOTES
A my chart message has been sent to the patient for PATIENT ROUNDING with [facility]

## 2024-08-26 RX ORDER — METOPROLOL SUCCINATE 25 MG/1
25 TABLET, EXTENDED RELEASE ORAL DAILY
Qty: 90 TABLET | Refills: 2 | Status: SHIPPED | OUTPATIENT
Start: 2024-08-26

## 2024-08-26 NOTE — TELEPHONE ENCOUNTER
"Caller: Helen Rousseau \"Val\"    Relationship: Self    Best call back number: [phone]    Requested Prescriptions:   Requested Prescriptions     Pending Prescriptions Disp Refills    metoprolol succinate XL (TOPROL-XL) 25 MG 24 hr tablet 90 tablet 3     Sig: Take 1 tablet by mouth Daily.        Pharmacy where request should be sent:  [facility]     Last office visit with prescribing clinician: 3/11/2024   Last telemedicine visit with prescribing clinician: Visit date not found   Next office visit with prescribing clinician: 9/23/2024     Additional details provided by patient: PT NEEDS THIS PRESCRIPTION TRANSFERRED FROM [facility] TO [facility]. PHARMACY SAID FOR OFFICE TO CALL OR FAX THIS OVER [phone]. PT HAS 7 DAY SUPPLY LEFT    Does the patient have less than a 3 day supply:  [] Yes  [x] No    Would you like a call back once the refill request has been completed: [] Yes [] No    If the office needs to give you a call back, can they leave a voicemail: [] Yes [] No    Krystle Lacy Rep   08/26/24 10:43 EDT         "

## 2024-08-26 NOTE — TELEPHONE ENCOUNTER
Rx Refill Note  Requested Prescriptions     Signed Prescriptions Disp Refills    metoprolol succinate XL (TOPROL-XL) 25 MG 24 hr tablet 90 tablet 2     Sig: Take 1 tablet by mouth Daily.     Authorizing Provider: ALFA WALL     Ordering User: IVANA DIAZ      Last office visit with prescribing clinician: 3/11/2024   Last telemedicine visit with prescribing clinician: Visit date not found   Next office visit with prescribing clinician: 9/23/2024                         Would you like a call back once the refill request has been completed: [] Yes [] No    If the office needs to give you a call back, can they leave a voicemail: [] Yes [] No    Ivana Diaz MA  08/26/24, 11:04 EDT

## 2024-09-16 ENCOUNTER — OFFICE VISIT (OUTPATIENT)
Dept: FAMILY MEDICINE CLINIC | Facility: CLINIC | Age: 79
End: 2024-09-16
Payer: MEDICARE

## 2024-09-16 ENCOUNTER — TELEPHONE (OUTPATIENT)
Dept: FAMILY MEDICINE CLINIC | Facility: CLINIC | Age: 79
End: 2024-09-16
Payer: MEDICARE

## 2024-09-16 ENCOUNTER — HOSPITAL ENCOUNTER (OUTPATIENT)
Dept: GENERAL RADIOLOGY | Facility: HOSPITAL | Age: 79
Discharge: HOME | End: 2024-09-16
Admitting: NURSE PRACTITIONER
Payer: MEDICARE

## 2024-09-16 VITALS
BODY MASS INDEX: 20.72 KG/M2 | HEART RATE: 88 BPM | SYSTOLIC BLOOD PRESSURE: 95 MMHG | WEIGHT: 132 LBS | OXYGEN SATURATION: 97 % | HEIGHT: 67 IN | DIASTOLIC BLOOD PRESSURE: 62 MMHG
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
## 2024-09-16 DIAGNOSIS — M25.551 BILATERAL HIP PAIN: ICD-10-CM

## 2024-09-16 DIAGNOSIS — M79.606 LOW BACK PAIN RADIATING TO LOWER EXTREMITY: ICD-10-CM

## 2024-09-16 DIAGNOSIS — M54.50 LOW BACK PAIN RADIATING TO LOWER EXTREMITY: Primary | ICD-10-CM

## 2024-09-16 DIAGNOSIS — M54.50 LOW BACK PAIN RADIATING TO LOWER EXTREMITY: ICD-10-CM

## 2024-09-16 DIAGNOSIS — M25.552 BILATERAL HIP PAIN: ICD-10-CM

## 2024-09-16 DIAGNOSIS — M79.606 LOW BACK PAIN RADIATING TO LOWER EXTREMITY: Primary | ICD-10-CM

## 2024-09-16 PROCEDURE — 73521 X-RAY EXAM HIPS BI 2 VIEWS: CPT

## 2024-09-16 RX ORDER — TRAMADOL HYDROCHLORIDE 50 MG/1
50 TABLET ORAL EVERY 8 HOURS PRN
Qty: 20 TABLET | Refills: 0 | Status: SHIPPED | OUTPATIENT
Start: 2024-09-16
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
## 2024-09-18 DIAGNOSIS — M25.552 LEFT HIP PAIN: Primary | ICD-10-CM

## 2024-09-18 DIAGNOSIS — R93.89 ABNORMAL X-RAY: ICD-10-CM

## 2024-10-09 ENCOUNTER — TELEPHONE (OUTPATIENT)
Dept: FAMILY MEDICINE CLINIC | Facility: CLINIC | Age: 79
End: 2024-10-09

## 2024-10-09 NOTE — TELEPHONE ENCOUNTER
"  Caller: Helen Rousseau \"Val\"    Relationship: Self    Best call back number: [phone]     Who are you requesting to speak with (clinical staff, provider,  specific staff member): BASILIA LINK OR DR. MCKENZIE    What was the call regarding: HAS QUESTION ABOUT IF THE CT SCAN SHE IS SCHEDULE FOR GOING TO COST HER OUT OF POCKET LIKE SHE DID FOR THE XRAY WANTS TO KNOW IF SHE ABSOLUTELY NEEDS THE CT SCAN OR IS IT SOMETHING SHE CAN GO WITHOUT IF IT IS NECESSARY SHE'S WILLING TO PAY IF SHE HAS TO PLEASE CALL AND ADVISE      "

## 2024-10-14 NOTE — TELEPHONE ENCOUNTER
It would depend on the type of fracture as to what could be done.  If she wants to see Ortho first, I can put in referral to that specialist and she can get their opinion.

## 2024-10-14 NOTE — TELEPHONE ENCOUNTER
Pt stopped in the office today and said her ins will cover the CT but the part she will have to pay is still pretty expensive and she just wanted to make sure this test was needed or if it was just for informational purposes? She is scheduled for Tues @ 5 if someone could please let her know if she needs to go or cancel?

## 2024-10-14 NOTE — TELEPHONE ENCOUNTER
The CT scan was recommended by the radiologist to help determine if she has a hip/pelvic fracture.  They were not able to determine that from just the xray due to the swelling.

## 2024-10-14 NOTE — TELEPHONE ENCOUNTER
Patient said it will cost her $200 out of pocket and she wants to know if it was fractured is there anything that can be done for it? She also wants your opinion on if you think it is necessary for her to go ahead with the CT

## 2024-10-15 ENCOUNTER — HOSPITAL ENCOUNTER (OUTPATIENT)
Dept: CT IMAGING | Facility: HOSPITAL | Age: 79
Discharge: HOME | End: 2024-10-15
Admitting: NURSE PRACTITIONER
Payer: MEDICARE

## 2024-10-15 DIAGNOSIS — M25.552 LEFT HIP PAIN: ICD-10-CM

## 2024-10-15 DIAGNOSIS — R93.89 ABNORMAL X-RAY: ICD-10-CM
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
## 2024-10-15 PROCEDURE — 72192 CT PELVIS W/O DYE: CPT

## 2024-10-22 NOTE — PROGRESS NOTES
Encounter Date:10/30/2024  Last seen 3/11/2024      Patient ID: Helen Rousseau is a 79 y.o. female.      Chief complaint  Atrial fibrillation  Chronic anticoagulation  Dyslipidemia  Hypothyroidism     History of present illness  Since I have last seen, the patient has been without any chest discomfort ,shortness of breath, palpitations, dizziness or syncope.  Denies having any headache ,abdominal pain ,nausea, vomiting , diarrhea constipation, loss of weight or loss of appetite.  Denies having any excessive bruising ,hematuria or blood in the stool.    Review of all systems negative except as indicated.    Reviewed ROS.     ]]]]]]]]]]]]]]]]]]]]]]  History  =============   - Atrial fibrillation.  Patient has converted with intravenous Cardizem and maintaining sinus rhythm.     Echocardiogram 1/29/2024     Mild mitral regurgitation.  Moderate tricuspid regurgitation.  Mild left atrial enlargement.  No evidence for pulmonary hypertension.  Normal systolic longitudinal left ventricular strain at -20%.  Normal diastolic ventricular function.  Left ventricular size and contractility is normal with ejection fraction of 60%.     Stress Cardiolite test  Normal-12/24/2023     - History of COVID     -Dyslipidemia hypothyroidism pantoprazole     - Allergic to atorvastatin pravastatin simvastatin sulfamethizole trimethoprim.     - Non-smoker.  ============  Plan  =============  History of new onset atrial fibrillation.  Patient was cardioverted with IV amiodarone.  Continue p.o. metoprolol XL 25 mg a day..  EKG 3/11/2024-sinus rhythm  EKG 10/30/2024-sinus rhythm without ischemic changes     Anticoagulation.  [id number] score is 3  Continue Eliquis.  Observe for toxic effects of medication.  Patient was explained about the rationale for anticoagulation.    Blood pressure 118/70     Hypothyroidism-on levothyroxine     Medications were reviewed and updated.     Patient is on Eliquis metoprolol levothyroxine     Further plan  will depend on patient's progress.     Follow-up in the office in 6 months with EKG.     Reviewed updated 10/30/2024.  ]]]]]]]]]]]]]]]]]]]]           Diagnosis Plan   1. AF (paroxysmal atrial fibrillation)        2. Chronic anticoagulation        3. Hypothyroidism (acquired)        LAB RESULTS (LAST 7 DAYS)    CBC        BMP        CMP         BNP        TROPONIN        CoAg        Creatinine Clearance  CrCl cannot be calculated (Patient's most recent lab result is older than the maximum 30 days allowed.).    ABG        Radiology  No radiology results for the last day                The following portions of the patient's history were reviewed and updated as appropriate: allergies, current medications, past family history, past medical history, past social history, past surgical history, and problem list.    Review of Systems   Constitutional: Negative for malaise/fatigue.   Cardiovascular:  Negative for chest pain, dyspnea on exertion, leg swelling and palpitations.   Respiratory:  Negative for cough and shortness of breath.    Gastrointestinal:  Negative for abdominal pain, nausea and vomiting.   Neurological:  Negative for dizziness, focal weakness, headaches, light-headedness and numbness.   All other systems reviewed and are negative.      Current Outpatient Medications:   •  apixaban (ELIQUIS) 5 MG tablet tablet, Take 1 tablet by mouth Every 12 (Twelve) Hours. Indications: Atrial Fibrillation, Disp: 180 tablet, Rfl: 2  •  bisacodyl (DULCOLAX) 5 MG EC tablet, Take 1 tablet by mouth Daily As Needed for Constipation., Disp: , Rfl:   •  calcium (OS-ARMANDO) 600 MG tablet, TAKE ONE TABLET BY MOUTH EVERY DAY, Disp: 60 tablet, Rfl: 10  •  cetirizine (zyrTEC) 10 MG tablet, ZYRTEC ALLERGY 10 MG TABS, Disp: , Rfl:   •  coenzyme Q10 100 MG capsule, Take 1 capsule by mouth Daily., Disp: , Rfl:   •  dilTIAZem CD (CARDIZEM CD) 240 MG 24 hr capsule, Take 1 capsule by mouth Daily., Disp: 90 capsule, Rfl: 2  •  estradiol (ESTRACE)  0.1 MG/GM vaginal cream, , Disp: , Rfl:   •  fluticasone (FLONASE) 50 MCG/ACT nasal spray, 2 sprays into the nostril(s) as directed by provider Daily., Disp: 18 mL, Rfl: 11  •  Garlic 1000 MG capsule, Take 1 capsule by mouth Daily., Disp: , Rfl:   •  levothyroxine (Synthroid) 50 MCG tablet, Take 1 tablet by mouth Daily., Disp: 90 tablet, Rfl: 1  •  liothyronine (CYTOMEL) 5 MCG tablet, TAKE 2 TABLETS DAILY, Disp: 180 tablet, Rfl: 1  •  metoprolol succinate XL (TOPROL-XL) 25 MG 24 hr tablet, Take 1 tablet by mouth Daily., Disp: 90 tablet, Rfl: 2  •  Misc Natural Products (Neuriva) capsule, Take  by mouth., Disp: , Rfl:   •  Multiple Vitamin (MULTIVITAMIN) capsule, MULTIVITAMINS CAPS, Disp: , Rfl:   •  omeprazole (priLOSEC) 40 MG capsule, TAKE 1 CAPSULE DAILY, Disp: 90 capsule, Rfl: 1  •  ondansetron ODT (ZOFRAN-ODT) 4 MG disintegrating tablet, Place 1 tablet on the tongue Every 8 (Eight) Hours As Needed for Nausea or Vomiting., Disp: 30 tablet, Rfl: 0  •  Polyethylene Glycol 3350 (MIRALAX PO), Take 1 each by mouth 4 (Four) Times a Week., Disp: , Rfl:   •  potassium chloride 10 MEQ CR tablet, Take 1 tablet by mouth Daily., Disp: 90 tablet, Rfl: 2  •  promethazine (PHENERGAN) 25 MG tablet, Take 1 tablet by mouth Every 8 (Eight) Hours As Needed for Nausea or Vomiting., Disp: 30 tablet, Rfl: 0  •  traMADol (ULTRAM) 50 MG tablet, Take 1 tablet by mouth Every 8 (Eight) Hours As Needed for Severe Pain., Disp: 20 tablet, Rfl: 0    Allergies   Allergen Reactions   • Atorvastatin Calcium Myalgia   • Pravastatin Sodium Myalgia   • Simvastatin Myalgia   • Sulfamethoxazole-Trimethoprim Rash       Family History   Problem Relation Age of Onset   • Thyroid disease Mother         100 years bold   • Diabetes Father         Diabetic   • Hyperlipidemia Father         High blood pressure   • Cancer Sister         Colon cancer   • Hyperlipidemia Sister    • Thyroid disease Sister    • Diabetes Brother         Diabetic   • Cancer Brother          Bladder cancer   • Cancer Brother         Multiple Myeloma   • Cancer Brother         Lung Cancer   • Diabetes Brother         Diabetic.   • Hyperlipidemia Brother    • Hyperlipidemia Brother    • Hyperlipidemia Brother    • Kidney disease Sister    • Thyroid disease Sister        Past Surgical History:   Procedure Laterality Date   • BASAL CELL CARCINOMA EXCISION      nose   • BUNIONECTOMY Right    • COLONOSCOPY      2016- due 5 years   • HYSTERECTOMY     • TUBAL ABDOMINAL LIGATION  ?? 1977       Past Medical History:   Diagnosis Date   • Actinic keratosis    • Atrial fibrillation    • Basal cell adenocarcinoma    • Bell's palsy     Left   • Chronic constipation    • DDD (degenerative disc disease), cervical    • DDD (degenerative disc disease), cervical    • Edema    • GERD (gastroesophageal reflux disease) 2015   • Headache    • Hyperlipidemia    • Hyperthyroidism 15/20 years ago    Hashimoto.   • Hypothyroidism    • Migraine headache    • Mild carotid artery disease    • Osteoarthritis    • Statin-induced myositis    • Urinary tract infection Have several a year       Family History   Problem Relation Age of Onset   • Thyroid disease Mother         100 years bold   • Diabetes Father         Diabetic   • Hyperlipidemia Father         High blood pressure   • Cancer Sister         Colon cancer   • Hyperlipidemia Sister    • Thyroid disease Sister    • Diabetes Brother         Diabetic   • Cancer Brother         Bladder cancer   • Cancer Brother         Multiple Myeloma   • Cancer Brother         Lung Cancer   • Diabetes Brother         Diabetic.   • Hyperlipidemia Brother    • Hyperlipidemia Brother    • Hyperlipidemia Brother    • Kidney disease Sister    • Thyroid disease Sister        Social History     Socioeconomic History   • Marital status:    Tobacco Use   • Smoking status: Former     Current packs/day: 0.00     Average packs/day: 0.3 packs/day for 4.0 years (1.0 ttl pk-yrs)     Types:  Cigarettes     Start date: 1963     Quit date: 1967     Years since quittin.8     Passive exposure: Past   • Smokeless tobacco: Never   • Tobacco comments:     2 to 4 cigarettes a day.   Vaping Use   • Vaping status: Never Used   Substance and Sexual Activity   • Alcohol use: Yes     Comment: Maybe a 2-4 ounce glass of wine 1 month.   • Drug use: Never   • Sexual activity: Yes     Partners: Male     Birth control/protection: Post-menopausal, Hysterectomy           ECG 12 Lead    Date/Time: 10/30/2024 4:28 PM  Performed by: Brennan Andrews MD    Authorized by: Brennan Andrews MD  Comparison: compared with previous ECG   Similar to previous ECG  Comparison to previous ECG: Normal sinus rhythm normal ECG 74/min normal axis normal intervals no ectopy no significant change from previous EKG.        Objective:       Physical Exam    There were no vitals taken for this visit.  The patient is alert, oriented and in no distress.    Vital signs as noted above.    Head and neck revealed no carotid bruits or jugular venous distension.  No thyromegaly or lymphadenopathy is present.    Lungs clear.  No wheezing.  Breath sounds are normal bilaterally.    Heart normal first and second heart sounds.  No murmur..  No pericardial rub is present.  No gallop is present.    Abdomen soft and nontender.  No organomegaly is present.    Extremities revealed good peripheral pulses without any pedal edema.    Skin warm and dry.    Musculoskeletal system is grossly normal.    CNS grossly normal.    Reviewed and updated.

## 2024-10-30 ENCOUNTER — OFFICE VISIT (OUTPATIENT)
Dept: CARDIOLOGY | Facility: CLINIC | Age: 79
End: 2024-10-30
Payer: MEDICARE

## 2024-10-30 VITALS
SYSTOLIC BLOOD PRESSURE: 118 MMHG | HEIGHT: 67 IN | BODY MASS INDEX: 20.4 KG/M2 | DIASTOLIC BLOOD PRESSURE: 71 MMHG | HEART RATE: 73 BPM | OXYGEN SATURATION: 96 % | WEIGHT: 130 LBS

## 2024-10-30 DIAGNOSIS — E03.9 HYPOTHYROIDISM (ACQUIRED): ICD-10-CM

## 2024-10-30 DIAGNOSIS — I48.0 AF (PAROXYSMAL ATRIAL FIBRILLATION): Primary | ICD-10-CM

## 2024-10-30 DIAGNOSIS — Z79.01 CHRONIC ANTICOAGULATION: ICD-10-CM

## 2024-10-30 RX ORDER — METOPROLOL SUCCINATE 25 MG/1
25 TABLET, EXTENDED RELEASE ORAL DAILY
Qty: 90 TABLET | Refills: 2 | Status: SHIPPED | OUTPATIENT
Start: 2024-10-30

## 2024-10-31 ENCOUNTER — TELEPHONE (OUTPATIENT)
Dept: CARDIOLOGY | Facility: CLINIC | Age: 79
End: 2024-10-31
Payer: MEDICARE

## 2024-10-31 DIAGNOSIS — K21.9 GASTROESOPHAGEAL REFLUX DISEASE, UNSPECIFIED WHETHER ESOPHAGITIS PRESENT: ICD-10-CM

## 2024-10-31 RX ORDER — OMEPRAZOLE 40 MG/1
40 CAPSULE, DELAYED RELEASE ORAL DAILY
Qty: 90 CAPSULE | Refills: 1 | Status: SHIPPED | OUTPATIENT
Start: 2024-10-31

## 2024-10-31 NOTE — TELEPHONE ENCOUNTER
Spoke with Tonny - stated patient had reached out for tier exception and wanted to inform office her Eliquis is no longer covered?   Said they will inform patient

## 2024-10-31 NOTE — TELEPHONE ENCOUNTER
CALL FROM [facility]  PHONE [phone]  REFERENCE NUMBER [id number]  MEDICATION:ELIQUIS 5 MG    THEY NEED CALL BACK. NEED MORE CLINICAL INFORMATION.  I ASKED IF THEY WERE ABLE TO FAX INFORMATION TO US AND THEY ARE NOT.

## 2024-12-03 ENCOUNTER — LAB (OUTPATIENT)
Dept: FAMILY MEDICINE CLINIC | Facility: CLINIC | Age: 79
End: 2024-12-03
Payer: MEDICARE

## 2024-12-03 ENCOUNTER — OFFICE VISIT (OUTPATIENT)
Dept: FAMILY MEDICINE CLINIC | Facility: CLINIC | Age: 79
End: 2024-12-03
Payer: MEDICARE

## 2024-12-03 VITALS
DIASTOLIC BLOOD PRESSURE: 84 MMHG | SYSTOLIC BLOOD PRESSURE: 118 MMHG | BODY MASS INDEX: 20.56 KG/M2 | OXYGEN SATURATION: 99 % | WEIGHT: 131 LBS | HEART RATE: 63 BPM | HEIGHT: 67 IN

## 2024-12-03 DIAGNOSIS — R82.81 PYURIA: ICD-10-CM

## 2024-12-03 DIAGNOSIS — Z78.0 POSTMENOPAUSAL STATUS: ICD-10-CM

## 2024-12-03 DIAGNOSIS — Z00.00 MEDICARE ANNUAL WELLNESS VISIT, SUBSEQUENT: Primary | ICD-10-CM

## 2024-12-03 DIAGNOSIS — H01.149: ICD-10-CM

## 2024-12-03 DIAGNOSIS — Z87.440 HISTORY OF RECURRENT UTIS: ICD-10-CM

## 2024-12-03 DIAGNOSIS — E78.00 PURE HYPERCHOLESTEROLEMIA: ICD-10-CM

## 2024-12-03 DIAGNOSIS — E06.3 HYPOTHYROIDISM DUE TO HASHIMOTO THYROIDITIS: ICD-10-CM

## 2024-12-03 LAB
ALBUMIN SERPL-MCNC: 4 G/DL (ref 3.5–5.2)
ALBUMIN/GLOB SERPL: 1.3 G/DL
ALP SERPL-CCNC: 60 U/L (ref 39–117)
ALT SERPL W P-5'-P-CCNC: 16 U/L (ref 1–33)
ANION GAP SERPL CALCULATED.3IONS-SCNC: 9.3 MMOL/L (ref 5–15)
AST SERPL-CCNC: 21 U/L (ref 1–32)
BILIRUB BLD-MCNC: NEGATIVE MG/DL
BILIRUB SERPL-MCNC: 0.3 MG/DL (ref 0–1.2)
BUN SERPL-MCNC: 12 MG/DL (ref 8–23)
BUN/CREAT SERPL: 17.9 (ref 7–25)
CALCIUM SPEC-SCNC: 9.5 MG/DL (ref 8.6–10.5)
CHLORIDE SERPL-SCNC: 104 MMOL/L (ref 98–107)
CHOLEST SERPL-MCNC: 207 MG/DL (ref 0–200)
CLARITY, POC: CLEAR
CO2 SERPL-SCNC: 28.7 MMOL/L (ref 22–29)
COLOR UR: YELLOW
CREAT SERPL-MCNC: 0.67 MG/DL (ref 0.57–1)
EGFRCR SERPLBLD CKD-EPI 2021: 88.5 ML/MIN/1.73
EXPIRATION DATE: ABNORMAL
GLOBULIN UR ELPH-MCNC: 3.1 GM/DL
GLUCOSE SERPL-MCNC: 87 MG/DL (ref 65–99)
GLUCOSE UR STRIP-MCNC: NEGATIVE MG/DL
HDLC SERPL-MCNC: 52 MG/DL (ref 40–60)
KETONES UR QL: NEGATIVE
LDLC SERPL CALC-MCNC: 138 MG/DL (ref 0–100)
LDLC/HDLC SERPL: 2.62 {RATIO}
LEUKOCYTE EST, POC: ABNORMAL
Lab: ABNORMAL
NITRITE UR-MCNC: NEGATIVE MG/ML
PH UR: 7 [PH] (ref 5–8)
POTASSIUM SERPL-SCNC: 4.1 MMOL/L (ref 3.5–5.2)
PROT SERPL-MCNC: 7.1 G/DL (ref 6–8.5)
PROT UR STRIP-MCNC: NEGATIVE MG/DL
RBC # UR STRIP: NEGATIVE /UL
SODIUM SERPL-SCNC: 142 MMOL/L (ref 136–145)
SP GR UR: 1.01 (ref 1–1.03)
T4 FREE SERPL-MCNC: 1.06 NG/DL (ref 0.92–1.68)
TRIGL SERPL-MCNC: 94 MG/DL (ref 0–150)
TSH SERPL DL<=0.05 MIU/L-ACNC: 0.93 UIU/ML (ref 0.27–4.2)
UROBILINOGEN UR QL: NORMAL
VLDLC SERPL-MCNC: 17 MG/DL (ref 5–40)

## 2024-12-03 PROCEDURE — 81003 URINALYSIS AUTO W/O SCOPE: CPT | Performed by: FAMILY MEDICINE

## 2024-12-03 PROCEDURE — 1170F FXNL STATUS ASSESSED: CPT | Performed by: FAMILY MEDICINE

## 2024-12-03 PROCEDURE — 36415 COLL VENOUS BLD VENIPUNCTURE: CPT

## 2024-12-03 PROCEDURE — 1126F AMNT PAIN NOTED NONE PRSNT: CPT | Performed by: FAMILY MEDICINE

## 2024-12-03 PROCEDURE — 87077 CULTURE AEROBIC IDENTIFY: CPT | Performed by: FAMILY MEDICINE

## 2024-12-03 PROCEDURE — G0439 PPPS, SUBSEQ VISIT: HCPCS | Performed by: FAMILY MEDICINE

## 2024-12-03 PROCEDURE — 1160F RVW MEDS BY RX/DR IN RCRD: CPT | Performed by: FAMILY MEDICINE

## 2024-12-03 PROCEDURE — 84481 FREE ASSAY (FT-3): CPT | Performed by: FAMILY MEDICINE

## 2024-12-03 PROCEDURE — 99213 OFFICE O/P EST LOW 20 MIN: CPT | Performed by: FAMILY MEDICINE

## 2024-12-03 PROCEDURE — 87186 SC STD MICRODIL/AGAR DIL: CPT | Performed by: FAMILY MEDICINE

## 2024-12-03 PROCEDURE — 84439 ASSAY OF FREE THYROXINE: CPT | Performed by: FAMILY MEDICINE

## 2024-12-03 PROCEDURE — 80061 LIPID PANEL: CPT | Performed by: FAMILY MEDICINE

## 2024-12-03 PROCEDURE — 84443 ASSAY THYROID STIM HORMONE: CPT | Performed by: FAMILY MEDICINE

## 2024-12-03 PROCEDURE — 1159F MED LIST DOCD IN RCRD: CPT | Performed by: FAMILY MEDICINE

## 2024-12-03 PROCEDURE — 87086 URINE CULTURE/COLONY COUNT: CPT | Performed by: FAMILY MEDICINE

## 2024-12-03 PROCEDURE — 80053 COMPREHEN METABOLIC PANEL: CPT | Performed by: FAMILY MEDICINE

## 2024-12-03 NOTE — PATIENT INSTRUCTIONS
Consider getting the:  Tdap - tetanus with diptheria and whooping cough  RSV vaccine  Apply a small dot of vaseline below both eyes      Advance Directive    Advance directives are legal documents that allow you to make decisions about your health care and medical treatment in case you become unable to communicate for yourself. Advance directives let your wishes be known to family, friends, and health care providers.  Discussing and writing advance directives should happen over time rather than all at once. Advance directives can be changed and updated at any time. There are different types of advance directives, such as:  Medical power of .  Living will.  Do not resuscitate (DNR) order or do not attempt resuscitation (DNAR) order.  Health care proxy and medical power of   A health care proxy is also called a health care agent. This person is appointed to make medical decisions for you when you are unable to make decisions for yourself. Generally, people ask a trusted friend or family member to act as their proxy and represent their preferences. Make sure you have an agreement with your trusted person to act as your proxy. A proxy may have to make a medical decision on your behalf if your wishes are not known.  A medical power of , also called a durable power of  for health care, is a legal document that names your health care proxy. Depending on the laws in your state, the document may need to be:  Signed.  Notarized.  Dated.  Copied.  Witnessed.  Incorporated into your medical record.  You may also want to appoint a trusted person to manage your money in the event you are unable to do so. This is called a durable power of  for finances. It is a separate legal document from the durable power of  for health care. You may choose your health care proxy or someone different to act as your agent in money matters.  If you do not appoint a proxy, or there is a concern that  the proxy is not acting in your best interest, a court may appoint a guardian to act on your behalf.  Living will  A living will is a set of instructions that state your wishes about medical care when you cannot express them yourself. Health care providers should keep a copy of your living will in your medical record. You may want to give a copy to family members or friends. To alert caregivers in case of an emergency, you can place a card in your wallet to let them know that you have a living will and where they can find it. A living will is used if you become:  Terminally ill.  Disabled.  Unable to communicate or make decisions.  The following decisions should be included in your living will:  To use or not to use life support equipment, such as dialysis machines and breathing machines (ventilators).  Whether you want a DNR or DNAR order. This tells health care providers not to use cardiopulmonary resuscitation (CPR) if breathing or heartbeat stops.  To use or not to use tube feeding.  To be given or not to be given food and fluids.  Whether you want comfort (palliative) care when the goal becomes comfort rather than a cure.  Whether you want to donate your organs and tissues.  A living will does not give instructions for distributing your money and property if you should pass away.  DNR or DNAR  A DNR or DNAR order is a request not to have CPR in the event that your heart stops beating or you stop breathing. If a DNR or DNAR order has not been made and shared, a health care provider will try to help any patient whose heart has stopped or who has stopped breathing. If you plan to have surgery, talk with your health care provider about how your DNR or DNAR order will be followed if problems occur.  What if I do not have an advance directive?  Some states assign family decision makers to act on your behalf if you do not have an advance directive. Each state has its own laws about advance directives. You may want to  check with your health care provider, , or state representative about the laws in your state.  Summary  Advance directives are legal documents that allow you to make decisions about your health care and medical treatment in case you become unable to communicate for yourself.  The process of discussing and writing advance directives should happen over time. You can change and update advance directives at any time.  Advance directives may include a medical power of , a living will, and a DNR or DNAR order.  This information is not intended to replace advice given to you by your health care provider. Make sure you discuss any questions you have with your health care provider.  Document Revised: 09/21/2021 Document Reviewed: 09/21/2021  Elsevier Patient Education © 2024 [employer]

## 2024-12-03 NOTE — ASSESSMENT & PLAN NOTE
Counseled on RSV/COVID/FLU shots   Counseled on advanced directives and gave information on these

## 2024-12-03 NOTE — PROGRESS NOTES
Subjective   The ABCs of the Annual Wellness Visit  Medicare Wellness Visit      Helen Rousseau is a 79 y.o. patient who presents for a Medicare Wellness Visit.    The following portions of the patient's history were reviewed and   updated as appropriate: allergies, current medications, past family history, past medical history, past social history, past surgical history, and problem list.    Compared to one year ago, the patient's physical   health is the same.  Compared to one year ago, the patient's mental   health is the same.    Recent Hospitalizations:  This patient has had a [facility] admission record on file within the last 365 days.  Current Medical Providers:  Patient Care Team:  Maile Osorio MD as PCP - General (Family Medicine)  Brennan Andrews MD as Consulting Physician (Cardiology)  Lila Liu APRN as Nurse Practitioner (Orthopedic Surgery)    Outpatient Medications Prior to Visit   Medication Sig Dispense Refill    apixaban (ELIQUIS) 5 MG tablet tablet Take 1 tablet by mouth Every 12 (Twelve) Hours. Indications: Atrial Fibrillation 180 tablet 2    bisacodyl (DULCOLAX) 5 MG EC tablet Take 1 tablet by mouth Daily As Needed for Constipation.      calcium (OS-ARMANDO) 600 MG tablet TAKE ONE TABLET BY MOUTH EVERY DAY 60 tablet 10    cetirizine (zyrTEC) 10 MG tablet ZYRTEC ALLERGY 10 MG TABS      coenzyme Q10 100 MG capsule Take 1 capsule by mouth Daily.      estradiol (ESTRACE) 0.1 MG/GM vaginal cream       fluticasone (FLONASE) 50 MCG/ACT nasal spray 2 sprays into the nostril(s) as directed by provider Daily. 18 mL 11    Garlic 1000 MG capsule Take 1 capsule by mouth Daily.      levothyroxine (Synthroid) 50 MCG tablet Take 1 tablet by mouth Daily. 90 tablet 1    liothyronine (CYTOMEL) 5 MCG tablet TAKE 2 TABLETS DAILY 180 tablet 1    metoprolol succinate XL (TOPROL-XL) 25 MG 24 hr tablet Take 1 tablet by mouth Daily. 90 tablet 2    Misc Natural Products (Neuriva) capsule  Take  by mouth.      Multiple Vitamin (MULTIVITAMIN) capsule MULTIVITAMINS CAPS      omeprazole (priLOSEC) 40 MG capsule TAKE ONE CAPSULE BY MOUTH DAILY 90 capsule 1    ondansetron ODT (ZOFRAN-ODT) 4 MG disintegrating tablet Place 1 tablet on the tongue Every 8 (Eight) Hours As Needed for Nausea or Vomiting. 30 tablet 0    Polyethylene Glycol 3350 (MIRALAX PO) Take 1 each by mouth 4 (Four) Times a Week.      potassium chloride 10 MEQ CR tablet Take 1 tablet by mouth Daily. 90 tablet 2    promethazine (PHENERGAN) 25 MG tablet Take 1 tablet by mouth Every 8 (Eight) Hours As Needed for Nausea or Vomiting. 30 tablet 0    traMADol (ULTRAM) 50 MG tablet Take 1 tablet by mouth Every 8 (Eight) Hours As Needed for Severe Pain. (Patient not taking: Reported on 10/30/2024) 20 tablet 0     No facility-administered medications prior to visit.     No opioid medication identified on active medication list. I have reviewed chart for other potential  high risk medication/s and harmful drug interactions in the elderly.      Aspirin is not on active medication list.  Aspirin use is not indicated based on review of current medical condition/s. Risk of harm outweighs potential benefits.  .    Patient Active Problem List   Diagnosis    Arthritis of foot    Atrial fibrillation    Bell's palsy    Constipation, chronic    Edema leg    Leg cramps    Fatigue    Gastroesophageal reflux disease    Hashimoto's thyroiditis    Pure hypercholesterolemia    Hypothyroidism    Leukopenia    Memory loss    Migraine headache    Arthritis    Osteoarthritis of knee    Pyelonephritis, acute    Other specified dorsopathies, site unspecified    Medicare annual wellness visit, subsequent    Pharyngitis    Febrile illness, acute    Flatulence/gas pain/belching    Callus of foot    Hyperlipidemia    Migraine    Dysphagia    Family history of colon cancer    Family history of colonic polyps    Internal hemorrhoids without complication    Second degree  "hemorrhoids    Pneumonia    Allergies    Nausea    New onset a-fib    History of recurrent UTIs     Advance Care Planning Advance Directive is not on file.  ACP discussion was held with the patient during this visit. Patient does not have an advance directive, information provided.            Objective   Vitals:    24 0918 24 0951   BP: 161/76 118/84   BP Location: Left arm    Patient Position: Sitting    Cuff Size: Large Adult    Pulse: 63    SpO2: 99%    Weight: 59.4 kg (131 lb)    Height: 170.2 cm (67\")    PainSc: 0-No pain        Estimated body mass index is 20.52 kg/m² as calculated from the following:    Height as of this encounter: 170.2 cm (67\").    Weight as of this encounter: 59.4 kg (131 lb).    BMI is within normal parameters. No other follow-up for BMI required.       Does the patient have evidence of cognitive impairment? No   MMSE done                                                                                              Health  Risk Assessment    Smoking Status:  Social History     Tobacco Use   Smoking Status Former    Current packs/day: 0.00    Average packs/day: 0.3 packs/day for 4.0 years (1.0 ttl pk-yrs)    Types: Cigarettes    Start date: 1963    Quit date: 1967    Years since quittin.9    Passive exposure: Past   Smokeless Tobacco Never   Tobacco Comments    2 to 4 cigarettes a day.     Alcohol Consumption:  Social History     Substance and Sexual Activity   Alcohol Use Yes    Comment: Maybe a 2-4 ounce glass of wine 1 month.       Fall Risk Screen  STEADI Fall Risk Assessment was completed, and patient is at LOW risk for falls.Assessment completed on:12/3/2024    Depression Screening   Little interest or pleasure in doing things? Not at all   Feeling down, depressed, or hopeless? Not at all   PHQ-2 Total Score 0      Health Habits and Functional and Cognitive Screenin/2/2024     1:00 PM   Functional & Cognitive Status   Do you have difficulty " preparing food and eating? No    Do you have difficulty bathing yourself, getting dressed or grooming yourself? No    Do you have difficulty using the toilet? No    Do you have difficulty moving around from place to place? No    Do you have trouble with steps or getting out of a bed or a chair? No    Current Diet Well Balanced Diet    Dental Exam Up to date    Eye Exam Up to date    Exercise (times per week) 4 times per week    Current Exercises Include Gardening;House Cleaning;Stair Step Machine;Walking;Yard Work    Do you need help using the phone?  No    Are you deaf or do you have serious difficulty hearing?  No    Do you need help to go to places out of walking distance? No    Do you need help shopping? No    Do you need help preparing meals?  No    Do you need help with housework?  No    Do you need help with laundry? No    Do you need help taking your medications? No    Do you need help managing money? No    Do you ever drive or ride in a car without wearing a seat belt? No    Have you felt unusual stress, anger or loneliness in the last month? No    Who do you live with? Spouse    If you need help, do you have trouble finding someone available to you? No    Have you been bothered in the last four weeks by sexual problems? No    Do you have difficulty concentrating, remembering or making decisions? No        Patient-reported           Age-appropriate Screening Schedule:  Refer to the list below for future screening recommendations based on patient's age, sex and/or medical conditions. Orders for these recommended tests are listed in the plan section. The patient has been provided with a written plan.    Health Maintenance List  Health Maintenance   Topic Date Due    TDAP/TD VACCINES (1 - Tdap) Never done    ZOSTER VACCINE (2 of 3) 11/28/2014    RSV Vaccine - Adults (1 - 1-dose 75+ series) Never done    DXA SCAN  12/16/2021    COVID-19 Vaccine (4 - 2024-25 season) 09/01/2024    LIPID PANEL  11/28/2024     "INFLUENZA VACCINE  03/31/2025 (Originally 7/1/2024)    ANNUAL WELLNESS VISIT  12/03/2025    Pneumococcal Vaccine 65+  Completed                                                                                                                                                CMS Preventative Services Quick Reference  Risk Factors Identified During Encounter  Immunizations Discussed/Encouraged: Influenza    The above risks/problems have been discussed with the patient.  Pertinent information has been shared with the patient in the After Visit Summary.  An After Visit Summary and PPPS were made available to the patient.    Follow Up:   Next Medicare Wellness visit to be scheduled in 1 year.         Additional E&M Note during same encounter follows:  Patient has additional, significant, and separately identifiable condition(s)/problem(s) that require work above and beyond the Medicare Wellness Visit     Chief Complaint  Medicare Wellness-subsequent    Subjective   HPI  Puffy, itchy spot below both eyes  Needs follow up on bp, chol, and thyroid  Requesting ua sec to h/o recurrent uti      Review of Systems   Constitutional: Negative.    Respiratory: Negative.     Cardiovascular: Negative.               Objective   Vital Signs:  /84   Pulse 63   Ht 170.2 cm (67\")   Wt 59.4 kg (131 lb)   SpO2 99%   BMI 20.52 kg/m²   Physical Exam  Vitals and nursing note reviewed.   Constitutional:       Appearance: Normal appearance. She is normal weight.   Cardiovascular:      Rate and Rhythm: Normal rate and regular rhythm.      Heart sounds: Normal heart sounds.   Pulmonary:      Effort: Pulmonary effort is normal.      Breath sounds: Normal breath sounds.   Musculoskeletal:      Right lower leg: No edema.      Left lower leg: No edema.   Skin:     Comments: Skin below both eyes is puffy but no erythema or scaling   Neurological:      Mental Status: She is alert and oriented to person, place, and time.   Psychiatric:         " Mood and Affect: Mood normal.                    Brief Urine Lab Results  (Last result in the past 365 days)        Color   Clarity   Blood   Leuk Est   Nitrite   Protein   CREAT   Urine HCG        12/03/24 1009 Yellow   Clear   Negative   Trace   Negative   Negative                      Assessment and Plan            Medicare annual wellness visit, subsequent  Counseled on RSV/COVID/FLU shots   Counseled on advanced directives and gave information on these       Pure hypercholesterolemia       Orders:    Comprehensive Metabolic Panel; Future    Lipid Panel; Future    Hypothyroidism due to Hashimoto thyroiditis  She will continue current meds  Orders:    TSH; Future    T3, Free; Future    T4, Free; Future    Postmenopausal status    Orders:    DEXA Bone Density Axial; Future    History of recurrent UTIs    Orders:    POCT urinalysis dipstick, automated    Dryness of eyelid, unspecified laterality  She will try using a very small dot of vaseline below her lower lid       Pyuria    Orders:    Urine Culture - Urine, Urine, Clean Catch            Follow Up   Return in about 1 year (around 12/3/2025) for Medicare Wellness.  Patient was given instructions and counseling regarding her condition or for health maintenance advice. Please see specific information pulled into the AVS if appropriate.

## 2024-12-04 LAB — T3FREE SERPL-MCNC: 3.77 PG/ML (ref 2–4.4)

## 2024-12-05 LAB — BACTERIA SPEC AEROBE CULT: ABNORMAL

## 2024-12-05 RX ORDER — NITROFURANTOIN 25; 75 MG/1; MG/1
100 CAPSULE ORAL 2 TIMES DAILY
Qty: 14 CAPSULE | Refills: 0 | Status: SHIPPED | OUTPATIENT
Start: 2024-12-05 | End: 2024-12-12

## 2024-12-05 NOTE — PROGRESS NOTES
Patient notified of test results. No questions. Patient verbalize understanding. Send to [facility] on [address]

## 2024-12-06 ENCOUNTER — OFFICE VISIT (OUTPATIENT)
Age: 79
End: 2024-12-06
Payer: MEDICARE

## 2024-12-06 VITALS — WEIGHT: 131 LBS | OXYGEN SATURATION: 98 % | HEIGHT: 67 IN | HEART RATE: 71 BPM | BODY MASS INDEX: 20.56 KG/M2

## 2024-12-06 DIAGNOSIS — M77.41 METATARSALGIA OF RIGHT FOOT: Primary | ICD-10-CM

## 2024-12-06 DIAGNOSIS — L90.9 FAT PAD ATROPHY OF FOOT: ICD-10-CM

## 2024-12-06 NOTE — PROGRESS NOTES
12/06/2024  Foot and Ankle Surgery - Established Patient/Follow-up  Provider: ISAURA Camarena   Location: [facility]    Subjective:  Helen Rousseau is a 79 y.o. female.     Chief Complaint   Patient presents with    Right Foot - Follow-up, Callouses, Plantar Warts, Nail Problem    Left Ankle - Follow-up, Pain    Follow-up     Maile Osorio, [facility] -12/3/24       History of Present Illness  The patient is an 79-year-old female who presents for follow-up.    She reports that her ankle is in good condition. She has been using Powerstep insoles, which have provided some relief. However, she experiences pain when walking barefoot, particularly on hard surfaces such as concrete. She also mentions a sensation of hardness and thinness in her foot. She has a pair of Vionic shoes and has noticed a wart on her foot. She has a history of bunion surgery.       Allergies   Allergen Reactions    Atorvastatin Calcium Myalgia    Pravastatin Sodium Myalgia    Simvastatin Myalgia    Sulfamethoxazole-Trimethoprim Rash       Current Outpatient Medications on File Prior to Visit   Medication Sig Dispense Refill    apixaban (ELIQUIS) 5 MG tablet tablet Take 1 tablet by mouth Every 12 (Twelve) Hours. Indications: Atrial Fibrillation 180 tablet 2    bisacodyl (DULCOLAX) 5 MG EC tablet Take 1 tablet by mouth Daily As Needed for Constipation.      calcium (OS-ARMANDO) 600 MG tablet TAKE ONE TABLET BY MOUTH EVERY DAY 60 tablet 10    cetirizine (zyrTEC) 10 MG tablet ZYRTEC ALLERGY 10 MG TABS      coenzyme Q10 100 MG capsule Take 1 capsule by mouth Daily.      estradiol (ESTRACE) 0.1 MG/GM vaginal cream       fluticasone (FLONASE) 50 MCG/ACT nasal spray 2 sprays into the nostril(s) as directed by provider Daily. 18 mL 11    Garlic 1000 MG capsule Take 1 capsule by mouth Daily.      levothyroxine (Synthroid) 50 MCG tablet Take 1 tablet by mouth Daily. 90 tablet 1    liothyronine (CYTOMEL) 5 MCG tablet TAKE 2 TABLETS  "DAILY 180 tablet 1    metoprolol succinate XL (TOPROL-XL) 25 MG 24 hr tablet Take 1 tablet by mouth Daily. 90 tablet 2    Misc Natural Products (Neuriva) capsule Take  by mouth.      Multiple Vitamin (MULTIVITAMIN) capsule MULTIVITAMINS CAPS      nitrofurantoin, macrocrystal-monohydrate, (Macrobid) 100 MG capsule Take 1 capsule by mouth 2 (Two) Times a Day for 7 days. 14 capsule 0    omeprazole (priLOSEC) 40 MG capsule TAKE ONE CAPSULE BY MOUTH DAILY 90 capsule 1    ondansetron ODT (ZOFRAN-ODT) 4 MG disintegrating tablet Place 1 tablet on the tongue Every 8 (Eight) Hours As Needed for Nausea or Vomiting. 30 tablet 0    Polyethylene Glycol 3350 (MIRALAX PO) Take 1 each by mouth 4 (Four) Times a Week.      potassium chloride 10 MEQ CR tablet Take 1 tablet by mouth Daily. 90 tablet 2    promethazine (PHENERGAN) 25 MG tablet Take 1 tablet by mouth Every 8 (Eight) Hours As Needed for Nausea or Vomiting. 30 tablet 0     No current facility-administered medications on file prior to visit.       Objective   Pulse 71   Ht 170.2 cm (67\")   Wt 59.4 kg (131 lb)   SpO2 98%   BMI 20.52 kg/m²     Foot/Ankle Exam    GENERAL  Appearance:  appears stated age  Orientation:  AAOx3  Affect:  appropriate  Gait:  unimpaired  Assistance:  independent  Right shoe gear: casual shoe and sock  Left shoe gear: casual shoe and sock    VASCULAR     Right Foot Vascularity   Dorsalis pedis:  2+  Skin temperature:  warm  Edema grading:  None  CFT:  < 3 seconds  Pedal hair growth:  Present  Varicosities:  none     Left Foot Vascularity   Dorsalis pedis:  2+  Skin temperature:  warm  Edema grading:  None  CFT:  < 3 seconds  Pedal hair growth:  Present  Varicosities:  none     NEUROLOGIC     Right Foot Neurologic   Light touch sensation: normal     Left Foot Neurologic   Light touch sensation: normal    MUSCULOSKELETAL     Right Foot Musculoskeletal   Ecchymosis:  none  Tenderness:  metatarsal 1 and metatarsal 5       Left Foot Musculoskeletal "   Ecchymosis:  none  Tenderness:  none    DERMATOLOGIC      Right Foot Dermatologic   Skin  Positive for dryness and skin changes.      Left Foot Dermatologic   Skin  Positive for dryness and skin changes.     Physical Exam         Results  Imaging  X-ray of left ankle showed no bony abnormality.     Assessment & Plan   Diagnoses and all orders for this visit:    1. Metatarsalgia of right foot (Primary)  -     XR Foot 3+ View Right    2. Fat pad atrophy of foot      Assessment & Plan  1. Foot pain.  Her x-ray results were normal, with no bony abnormalities detected in the left ankle. The discomfort she is experiencing could be attributed to a slight reduction in her fat pad. There is no evidence of a wart or punctate keratosis, but a minor callus is present. The pain could be due to pressure on her forefoot and a decrease in the fat pad on her foot. Her feet are not symmetrical, which may cause her to exert more weight and pressure on her right foot, potentially leading to more issues with this foot. Calf stretching exercises were recommended to alleviate pressure on her forefoot. An x-ray of her right foot will be conducted. She was advised to avoid walking barefoot and to consider using Vionic house shoes. A follow-up appointment with Dr. Garcia was scheduled for 4 weeks later.    Follow-up  Patient is scheduled for a follow-up visit in 4 weeks.       Orders Placed This Encounter   Procedures    XR Foot 3+ View Right     Weight Bearing     Scheduling Instructions:      Room 13 can leave after xray     Order Specific Question:   Reason for Exam:     Answer:   first and 5th met head pain no injury     Order Specific Question:   Release to patient     Answer:   Routine Release [[id number]]          Patient or patient representative verbalized consent for the use of Ambient Listening during the visit with  ISAURA Mackey for chart documentation. 12/6/2024  12:30 EST

## 2025-01-08 DIAGNOSIS — K21.9 GASTROESOPHAGEAL REFLUX DISEASE, UNSPECIFIED WHETHER ESOPHAGITIS PRESENT: ICD-10-CM

## 2025-01-08 RX ORDER — OMEPRAZOLE 40 MG/1
40 CAPSULE, DELAYED RELEASE ORAL DAILY
Qty: 90 CAPSULE | Refills: 1 | Status: SHIPPED | OUTPATIENT
Start: 2025-01-08

## 2025-01-08 RX ORDER — METOPROLOL SUCCINATE 25 MG/1
25 TABLET, EXTENDED RELEASE ORAL DAILY
Qty: 90 TABLET | Refills: 1 | Status: SHIPPED | OUTPATIENT
Start: 2025-01-08

## 2025-01-08 RX ORDER — POTASSIUM CHLORIDE 750 MG/1
10 TABLET, EXTENDED RELEASE ORAL DAILY
Qty: 90 TABLET | Refills: 1 | Status: SHIPPED | OUTPATIENT
Start: 2025-01-08

## 2025-01-09 RX ORDER — LEVOTHYROXINE SODIUM 50 UG/1
50 TABLET ORAL DAILY
Qty: 90 TABLET | Refills: 1 | OUTPATIENT
Start: 2025-01-09

## 2025-01-09 NOTE — TELEPHONE ENCOUNTER
"Caller: Helen Rousseau \"Val\"    Relationship: Self    Best call back number:     [phone]       Requested Prescriptions:   Requested Prescriptions     Pending Prescriptions Disp Refills    apixaban (ELIQUIS) 5 MG tablet tablet 180 tablet 2     Sig: Take 1 tablet by mouth. Indications: Atrial Fibrillation    levothyroxine (Synthroid) 50 MCG tablet 90 tablet 1     Sig: Take 1 tablet by mouth Daily.        Pharmacy where request should be sent: [facility] [place] [address] [facility] - [phone]  - [phone]      Last office visit with prescribing clinician: 12/3/2024   Last telemedicine visit with prescribing clinician: Visit date not found   Next office visit with prescribing clinician: 12/9/2025     Additional details provided by patient:     Does the patient have less than a 3 day supply:  [] Yes  [x] No    Would you like a call back once the refill request has been completed: [] Yes [] No    If the office needs to give you a call back, can they leave a voicemail: [] Yes [] No    Krystle Chaves Rep   01/09/25 16:09 EST         "

## 2025-01-15 ENCOUNTER — OFFICE VISIT (OUTPATIENT)
Age: 80
End: 2025-01-15
Payer: MEDICARE

## 2025-01-15 VITALS — HEART RATE: 79 BPM | WEIGHT: 131 LBS | HEIGHT: 67 IN | OXYGEN SATURATION: 96 % | BODY MASS INDEX: 20.56 KG/M2

## 2025-01-15 DIAGNOSIS — M79.671 RIGHT FOOT PAIN: Primary | ICD-10-CM

## 2025-01-15 DIAGNOSIS — M21.861 ACQUIRED POSTERIOR EQUINUS OF RIGHT LOWER EXTREMITY: ICD-10-CM

## 2025-01-15 DIAGNOSIS — M77.41 METATARSALGIA OF RIGHT FOOT: ICD-10-CM

## 2025-01-15 DIAGNOSIS — L90.9 FAT PAD ATROPHY OF FOOT: ICD-10-CM

## 2025-01-15 DIAGNOSIS — M20.5X1 HALLUX LIMITUS, RIGHT: ICD-10-CM

## 2025-01-15 RX ORDER — LEVOTHYROXINE SODIUM 50 UG/1
50 TABLET ORAL DAILY
Qty: 90 TABLET | Refills: 1 | Status: SHIPPED | OUTPATIENT
Start: 2025-01-15

## 2025-01-15 NOTE — TELEPHONE ENCOUNTER
Caller: [facility] - [place], [place] - [address] - [phone] [facility] [phone] FX    Relationship: Pharmacy    Best call back number: [phone]    Requested Prescriptions:   Requested Prescriptions     Pending Prescriptions Disp Refills    apixaban (ELIQUIS) 5 MG tablet tablet 180 tablet 2     Sig: Take 1 tablet by mouth. Indications: Atrial Fibrillation    levothyroxine (Synthroid) 50 MCG tablet 90 tablet 1     Sig: Take 1 tablet by mouth Daily.        Pharmacy where request should be sent: [facility] - [place], [place] - [address] - [phone]  - [phone] FX     Last office visit with prescribing clinician: 12/3/2024   Last telemedicine visit with prescribing clinician: Visit date not found   Next office visit with prescribing clinician: 12/9/2025     Additional details provided by patient: STATED THEY HAD GOTTEN SOME OF THE REFILLS FOR HE PATIENT BUT HAD NOT GOTTEN THESE MEDICATIONS. STATED THAT THEY ARE NOT SURE OF THE QUANTITY THE PATIENT HAS REMAINING     Does the patient have less than a 3 day supply:  [] Yes  [] No    Would you like a call back once the refill request has been completed: [] Yes [x] No    If the office needs to give you a call back, can they leave a voicemail: [] Yes [x] No    Krystle Peterson Rep   01/15/25 13:55 EST

## 2025-01-16 NOTE — PROGRESS NOTES
01/15/2025  Foot and Ankle Surgery - Established Patient/Follow-up  Provider: Dr. Javier Garcia DPM  Location: [facility]    Subjective:  Helen Rousseau is a 79 y.o. female.     Chief Complaint   Patient presents with    Right Foot - Pain     Pain beneath 1st met head - pain 4-5 out of 10       History of Present Illness  The patient presents for evaluation of right foot pain.    She reports persistent discomfort in her right foot, particularly when walking barefoot. The pain is described as progressively worsening. She has been utilizing inserts, which have provided some relief. She also owns a pair of comfortable memory foam house slippers. She has not been engaging in any stretching exercises. Her medical history includes two foot surgeries performed by Dr. Roth in 2005 and 2008. Prior to these surgeries, she experienced significant pain and bruising on the plantar aspect of her foot while ambulating across her kitchen floor. The surgical intervention involved the removal of the bursa.      Allergies   Allergen Reactions    Atorvastatin Calcium Myalgia    Pravastatin Sodium Myalgia    Simvastatin Myalgia    Sulfamethoxazole-Trimethoprim Rash       Current Outpatient Medications on File Prior to Visit   Medication Sig Dispense Refill    apixaban (ELIQUIS) 5 MG tablet tablet Take 1 tablet by mouth Every 12 (Twelve) Hours. Indications: Atrial Fibrillation 180 tablet 2    bisacodyl (DULCOLAX) 5 MG EC tablet Take 1 tablet by mouth Daily As Needed for Constipation.      calcium (OS-ARMANDO) 600 MG tablet TAKE ONE TABLET BY MOUTH EVERY DAY 60 tablet 10    cetirizine (zyrTEC) 10 MG tablet ZYRTEC ALLERGY 10 MG TABS      coenzyme Q10 100 MG capsule Take 1 capsule by mouth Daily.      estradiol (ESTRACE) 0.1 MG/GM vaginal cream       fluticasone (FLONASE) 50 MCG/ACT nasal spray 2 sprays into the nostril(s) as directed by provider Daily. 18 mL 11    Garlic 1000 MG capsule Take 1 capsule by mouth Daily.       "liothyronine (CYTOMEL) 5 MCG tablet TAKE 2 TABLETS DAILY 180 tablet 1    metoprolol succinate XL (TOPROL-XL) 25 MG 24 hr tablet Take 1 tablet by mouth Daily. 90 tablet 1    Misc Natural Products (Neuriva) capsule Take  by mouth.      Multiple Vitamin (MULTIVITAMIN) capsule MULTIVITAMINS CAPS      omeprazole (priLOSEC) 40 MG capsule Take 1 capsule by mouth Daily. 90 capsule 1    ondansetron ODT (ZOFRAN-ODT) 4 MG disintegrating tablet Place 1 tablet on the tongue Every 8 (Eight) Hours As Needed for Nausea or Vomiting. 30 tablet 0    Polyethylene Glycol 3350 (MIRALAX PO) Take 1 each by mouth 4 (Four) Times a Week.      potassium chloride 10 MEQ CR tablet Take 1 tablet by mouth Daily. 90 tablet 1    promethazine (PHENERGAN) 25 MG tablet Take 1 tablet by mouth Every 8 (Eight) Hours As Needed for Nausea or Vomiting. 30 tablet 0     No current facility-administered medications on file prior to visit.       Objective   Pulse 79   Ht 170.2 cm (67\")   Wt 59.4 kg (131 lb)   SpO2 96%   BMI 20.52 kg/m²     Foot/Ankle Exam  Physical Exam  GENERAL  Appearance:  appears stated age  Orientation:  AAOx3  Affect:  appropriate  Gait:  unimpaired  Assistance:  independent  Right shoe gear: casual shoe and sock  Left shoe gear: casual shoe and sock     VASCULAR      Right Foot Vascularity   Dorsalis pedis:  2+  Skin temperature:  warm  Edema grading:  None  CFT:  < 3 seconds  Pedal hair growth:  Present  Varicosities:  none      Left Foot Vascularity   Dorsalis pedis:  2+  Skin temperature:  warm  Edema grading:  None  CFT:  < 3 seconds  Pedal hair growth:  Present  Varicosities:  none     NEUROLOGIC      Right Foot Neurologic   Light touch sensation: normal      Left Foot Neurologic   Light touch sensation: normal     MUSCULOSKELETAL      Right Foot Musculoskeletal   Ecchymosis:  none  Tenderness:  metatarsal 1 and metatarsal 5        Left Foot Musculoskeletal   Ecchymosis:  none  Tenderness:  none     DERMATOLOGIC       Right Foot " Dermatologic   Skin  Positive for dryness and skin changes.       Left Foot Dermatologic   Skin  Positive for dryness and skin changes.     1/15/25: Mild callus formation involving the plantar medial aspect of the right first metatarsophalangeal joint region.  Noted fat pad atrophy to the forefoot.  Mild to moderate soft tissue rigidity.  Limitation noted to the first metatarsophalangeal joint with dorsiflexion.  Mild equinus contracture with knee extended and flexed.      Results  Imaging  X-ray of the foot shows a bunion deformity and fat pad atrophy.    Assessment & Plan   Diagnoses and all orders for this visit:    1. Right foot pain (Primary)    2. Metatarsalgia of right foot    3. Hallux limitus, right    4. Fat pad atrophy of foot    5. Acquired posterior equinus of right lower extremity      Assessment & Plan    The discomfort is likely due to a combination of structural issues and fat pad atrophy. The hardware from previous surgeries appears to be in good condition and is not contributing to the current symptoms. The pain is more of an annoyance rather than a quality of life issue. She was advised to avoid walking barefoot and to refrain from wearing flip-flops, socks, and flats. Instead, she was recommended to use house shoes or slippers with memory foam for additional support. The use of inserts was also suggested, which can last up to 6 months with daily use. She was encouraged to continue using her existing pair of comfortable memory foam house slippers. The potential benefits of routine pedicures were discussed. She was also advised to perform stretching exercises 1 to 3 times daily to maintain flexibility. If symptoms significantly worsen, surgical options may be considered.Reviewed proper basic stretching and manual therapy exercises along with appropriate shoes and activity.  Discussed proper use and/or avoidance of OTC anti-inflammatories.  Patient is to call with any additional issues or concerns.   Greater than 20 minutes was spent before, during, and after evaluation for patient care.    Patient has opted to see me on an as-needed basis at this time.           Patient or patient representative verbalized consent for the use of Ambient Listening during the visit with  BINH Garcia DPM for chart documentation. 1/16/2025  07:00 EST    BINH Garcia DPM

## 2025-02-20 ENCOUNTER — TELEPHONE (OUTPATIENT)
Dept: FAMILY MEDICINE CLINIC | Facility: CLINIC | Age: 80
End: 2025-02-20
Payer: MEDICARE

## 2025-02-20 RX ORDER — LIOTHYRONINE SODIUM 5 UG/1
TABLET ORAL
Qty: 180 TABLET | Refills: 1 | Status: SHIPPED | OUTPATIENT
Start: 2025-02-20

## 2025-02-20 NOTE — TELEPHONE ENCOUNTER
Rx Refill Note  Requested Prescriptions     Signed Prescriptions Disp Refills    apixaban (ELIQUIS) 5 MG tablet tablet 180 tablet 2     Sig: Take 1 tablet by mouth Every 12 (Twelve) Hours. Indications: Atrial Fibrillation     Authorizing Provider: ALFA WALL     Ordering User: IVANA DIAZ      Last office visit with prescribing clinician: 10/30/2024   Last telemedicine visit with prescribing clinician: Visit date not found   Next office visit with prescribing clinician: 4/30/2025                         Would you like a call back once the refill request has been completed: [] Yes [] No    If the office needs to give you a call back, can they leave a voicemail: [] Yes [] No    Ivana Diaz MA  02/20/25, 12:16 EST

## 2025-02-20 NOTE — TELEPHONE ENCOUNTER
"    Caller: Helen Rousseau \"Val\"    Relationship: Self    Best call back number:[phone]     Requested Prescriptions:   Requested Prescriptions     Pending Prescriptions Disp Refills    liothyronine (CYTOMEL) 5 MCG tablet 180 tablet 1     Sig: TAKE 2 TABLETS DAILY        Pharmacy where request should be sent:  [id number] [address] AT [facility] [phone] [place] [phone]      Last office visit with prescribing clinician: 12/3/2024   Last telemedicine visit with prescribing clinician: Visit date not found   Next office visit with prescribing clinician: 12/9/2025     Additional details provided by patient:           Eli Hurtado, PCT   02/20/25 11:38 EST       "

## 2025-02-27 ENCOUNTER — TELEPHONE (OUTPATIENT)
Dept: FAMILY MEDICINE CLINIC | Facility: CLINIC | Age: 80
End: 2025-02-27

## 2025-02-27 NOTE — TELEPHONE ENCOUNTER
"      Hub staff attempted to follow warm transfer process and was unsuccessful     Caller: Helen Rousseau \"Val\"    Relationship to patient: Self    Best call back number:     Helen Rousseau \"Val\" (Self) [phone] (Mobile)       Patient is needing:   RETURNING CALL FROM THE OFFICE     PATIENT IS NEEDING THIS TO GO TO "[facility] ON [address]     SHE WILL BE USING [facility] BUT GOING TO TRY "[facility] [employer] FOR A BETTER ONTIVEROS     "

## 2025-02-27 NOTE — TELEPHONE ENCOUNTER
HUB RELAY  I left pt detailed vm. Looks like rx was refilled to [facility] on 2/20. Was this mailed out to her or canceled?

## 2025-02-27 NOTE — TELEPHONE ENCOUNTER
It looks like I refilled this prescription on the [facility].  At that 1 been mailed out to her?

## 2025-04-24 NOTE — PROGRESS NOTES
Encounter Date:04/30/2025  Last seen 10/30/2024.      Patient ID: Helen Rousseau is a 79 y.o. female.      Chief complaint  Atrial fibrillation  Chronic anticoagulation  Dyslipidemia  Hypothyroidism     History of present illness  Since I have last seen, the patient has been without any chest discomfort ,shortness of breath, palpitations, dizziness or syncope.  Denies having any headache ,abdominal pain ,nausea, vomiting , diarrhea constipation, loss of weight or loss of appetite.  Denies having any,hematuria or blood in the stool.  Patient is having excessive bruising.    Review of all systems negative except as indicated.    Reviewed ROS.     ]]]]]]]]]]]]]]]]]]]]]]  History  =============   - Atrial fibrillation.  Patient has converted with intravenous Cardizem and maintaining sinus rhythm.     Echocardiogram 1/29/2024     Mild mitral regurgitation.  Moderate tricuspid regurgitation.  Mild left atrial enlargement.  No evidence for pulmonary hypertension.  Normal systolic longitudinal left ventricular strain at -20%.  Normal diastolic ventricular function.  Left ventricular size and contractility is normal with ejection fraction of 60%.     Stress Cardiolite test  Normal-12/24/2023     - History of COVID     -Dyslipidemia hypothyroidism pantoprazole     - Allergic to atorvastatin pravastatin simvastatin sulfamethizole trimethoprim.     - Non-smoker.  ============  Plan  =============  History of new onset atrial fibrillation.  Patient has converted with IV amiodarone.    Continue p.o. metoprolol XL 25 mg a day..    EKG 3/11/2024-sinus rhythm  EKG 10/30/2024-sinus rhythm without ischemic changes     Anticoagulation status reviewed.  [id number] score is 3  Patient is having excessive bruising.  Patient would like to go off Eliquis Since she did not have any episodes of atrial fibrillation.  Had a lengthy discussion with her about pros and cons.  Patient to watch frequency of A-fib if any with [employer]  Watch.  Observe toxic effects of high-risk medication.  Reduce Eliquis to 2.5 mg twice daily.  If no episodes are found consideration will be given for discontinuation of Eliquis at next visit.    Blood pressure well-controlled  103/65.    Hypothyroidism-continue levothyroxine.    Medications were reviewed and updated.    Further plan will depend on patient's progress.    Follow-up in the office in 6 months.    Reviewed and updated 4/30/2025.    ]]]]]]]]]]]]]]]]]]]]       Diagnosis Plan   1. AF (paroxysmal atrial fibrillation)        2. Chronic anticoagulation        3. Hypothyroidism (acquired)        LAB RESULTS (LAST 7 DAYS)    CBC        BMP        CMP         BNP        TROPONIN        CoAg        Creatinine Clearance  CrCl cannot be calculated (Patient's most recent lab result is older than the maximum 30 days allowed.).    ABG        Radiology  No radiology results for the last day                The following portions of the patient's history were reviewed and updated as appropriate: allergies, current medications, past family history, past medical history, past social history, past surgical history, and problem list.    Review of Systems   Constitutional: Negative for malaise/fatigue.   Cardiovascular:  Negative for chest pain, leg swelling, palpitations and syncope.   Respiratory:  Negative for shortness of breath.    Skin:  Negative for rash.   Gastrointestinal:  Negative for nausea and vomiting.   Neurological:  Negative for dizziness, light-headedness and numbness.   All other systems reviewed and are negative.        Current Outpatient Medications:     apixaban (ELIQUIS) 5 MG tablet tablet, Take 1 tablet by mouth Every 12 (Twelve) Hours. Indications: Atrial Fibrillation, Disp: 180 tablet, Rfl: 2    bisacodyl (DULCOLAX) 5 MG EC tablet, Take 1 tablet by mouth Daily As Needed for Constipation., Disp: , Rfl:     calcium (OS-ARMANDO) 600 MG tablet, TAKE ONE TABLET BY MOUTH EVERY DAY, Disp: 60 tablet, Rfl: 10     cetirizine (zyrTEC) 10 MG tablet, ZYRTEC ALLERGY 10 MG TABS, Disp: , Rfl:     coenzyme Q10 100 MG capsule, Take 1 capsule by mouth Daily., Disp: , Rfl:     estradiol (ESTRACE) 0.1 MG/GM vaginal cream, , Disp: , Rfl:     fluticasone (FLONASE) 50 MCG/ACT nasal spray, 2 sprays into the nostril(s) as directed by provider Daily., Disp: 18 mL, Rfl: 11    Garlic 1000 MG capsule, Take 1 capsule by mouth Daily., Disp: , Rfl:     levothyroxine (Synthroid) 50 MCG tablet, Take 1 tablet by mouth Daily., Disp: 90 tablet, Rfl: 1    liothyronine (CYTOMEL) 5 MCG tablet, TAKE 2 TABLETS DAILY, Disp: 180 tablet, Rfl: 1    metoprolol succinate XL (TOPROL-XL) 25 MG 24 hr tablet, Take 1 tablet by mouth Daily., Disp: 90 tablet, Rfl: 1    Misc Natural Products (Neuriva) capsule, Take  by mouth., Disp: , Rfl:     Multiple Vitamin (MULTIVITAMIN) capsule, MULTIVITAMINS CAPS, Disp: , Rfl:     omeprazole (priLOSEC) 40 MG capsule, Take 1 capsule by mouth Daily., Disp: 90 capsule, Rfl: 1    ondansetron ODT (ZOFRAN-ODT) 4 MG disintegrating tablet, Place 1 tablet on the tongue Every 8 (Eight) Hours As Needed for Nausea or Vomiting., Disp: 30 tablet, Rfl: 0    Polyethylene Glycol 3350 (MIRALAX PO), Take 1 each by mouth 4 (Four) Times a Week., Disp: , Rfl:     potassium chloride 10 MEQ CR tablet, Take 1 tablet by mouth Daily., Disp: 90 tablet, Rfl: 1    promethazine (PHENERGAN) 25 MG tablet, Take 1 tablet by mouth Every 8 (Eight) Hours As Needed for Nausea or Vomiting., Disp: 30 tablet, Rfl: 0    Allergies   Allergen Reactions    Atorvastatin Calcium Myalgia    Pravastatin Sodium Myalgia    Simvastatin Myalgia    Sulfamethoxazole-Trimethoprim Rash       Family History   Problem Relation Age of Onset    Thyroid disease Mother         100 years bold    Hyperlipidemia Mother         Leaky heart valve    Diabetes Father         Diabetic    Hyperlipidemia Father         High blood pressure, heart attack.    Cancer Sister         Colon cancer     Hyperlipidemia Sister         Afib, high blood pressure, colon cancer    Thyroid disease Sister     Diabetes Brother         Diabetic    Cancer Brother         Bladder cancer    Cancer Brother         Multiple Myloma    Cancer Brother         Lung cancer    Diabetes Brother         Diabetic    Hyperlipidemia Brother         Diabetic,    Hyperlipidemia Brother     Hyperlipidemia Brother         Skin cancer,    Kidney disease Sister     Thyroid disease Sister     Asthma Brother         Dimentaia       Past Surgical History:   Procedure Laterality Date    BASAL CELL CARCINOMA EXCISION      nose    BUNIONECTOMY Right     COLONOSCOPY      2016- due 5 years    HYSTERECTOMY      TUBAL ABDOMINAL LIGATION  ?? 1977       Past Medical History:   Diagnosis Date    Actinic keratosis     Atrial fibrillation     Basal cell adenocarcinoma     Bell's palsy     Left    Chronic constipation     DDD (degenerative disc disease), cervical     DDD (degenerative disc disease), cervical     Edema     GERD (gastroesophageal reflux disease) 2015    Headache     Hyperlipidemia     Hyperthyroidism 15/20 years ago    Hashimoto.    Hypothyroidism     Migraine headache     Mild carotid artery disease     Osteoarthritis     Osteopenia 2024    Xray / CT scan    Statin-induced myositis     Tuberculosis 1980’s    Test positive / never had it.    Urinary tract infection Have several a year       Family History   Problem Relation Age of Onset    Thyroid disease Mother         100 years bold    Hyperlipidemia Mother         Leaky heart valve    Diabetes Father         Diabetic    Hyperlipidemia Father         High blood pressure, heart attack.    Cancer Sister         Colon cancer    Hyperlipidemia Sister         Afib, high blood pressure, colon cancer    Thyroid disease Sister     Diabetes Brother         Diabetic    Cancer Brother         Bladder cancer    Cancer Brother         Multiple Myloma    Cancer Brother         Lung cancer    Diabetes Brother   "       Diabetic    Hyperlipidemia Brother         Diabetic,    Hyperlipidemia Brother     Hyperlipidemia Brother         Skin cancer,    Kidney disease Sister     Thyroid disease Sister     Asthma Brother         Dimentaia       Social History     Socioeconomic History    Marital status:    Tobacco Use    Smoking status: Former     Current packs/day: 0.00     Average packs/day: 0.3 packs/day for 4.0 years (1.0 ttl pk-yrs)     Types: Cigarettes     Start date: 1963     Quit date: 1967     Years since quittin.3     Passive exposure: Past    Smokeless tobacco: Never    Tobacco comments:     2 to 4 cigarettes a day.   Vaping Use    Vaping status: Never Used   Substance and Sexual Activity    Alcohol use: Yes     Comment: Maybe a 2-4 ounce glass of wine 1 month.    Drug use: Never    Sexual activity: Yes     Partners: Male     Birth control/protection: Post-menopausal, Hysterectomy           ECG 12 Lead    Date/Time: 2025 2:40 PM  Performed by: Brennan Andrews MD    Authorized by: Brennan Andrews MD  Comparison: compared with previous ECG   Similar to previous ECG  Comparison to previous ECG: Normal sinus rhythm normal ECG 66 pulmonary normal axis normal intervals no ectopy no significant change from previous EKG.        Objective:       Physical Exam    /65 (BP Location: Right arm, Patient Position: Sitting, Cuff Size: Adult)   Pulse 66   Ht 170.2 cm (67\")   Wt 58.5 kg (129 lb)   SpO2 99%   BMI 20.20 kg/m²   The patient is alert, oriented and in no distress.    Vital signs as noted above.    Head and neck revealed no carotid bruits or jugular venous distension.  No thyromegaly or lymphadenopathy is present.    Lungs clear.  No wheezing.  Breath sounds are normal bilaterally.    Heart normal first and second heart sounds.  No murmur..  No pericardial rub is present.  No gallop is present.    Abdomen soft and nontender.  No organomegaly is present.    Extremities revealed good " peripheral pulses without any pedal edema.    Skin warm and dry.    Musculoskeletal system is grossly normal.    CNS grossly normal.    Reviewed and updated.

## 2025-04-30 ENCOUNTER — OFFICE VISIT (OUTPATIENT)
Dept: CARDIOLOGY | Facility: CLINIC | Age: 80
End: 2025-04-30
Payer: MEDICARE

## 2025-04-30 VITALS
HEIGHT: 67 IN | WEIGHT: 129 LBS | SYSTOLIC BLOOD PRESSURE: 103 MMHG | HEART RATE: 66 BPM | BODY MASS INDEX: 20.25 KG/M2 | DIASTOLIC BLOOD PRESSURE: 65 MMHG | OXYGEN SATURATION: 99 %

## 2025-04-30 DIAGNOSIS — E03.9 HYPOTHYROIDISM (ACQUIRED): ICD-10-CM

## 2025-04-30 DIAGNOSIS — Z79.01 CHRONIC ANTICOAGULATION: ICD-10-CM

## 2025-04-30 DIAGNOSIS — I48.0 AF (PAROXYSMAL ATRIAL FIBRILLATION): Primary | ICD-10-CM

## 2025-05-26 RX ORDER — LIOTHYRONINE SODIUM 5 UG/1
10 TABLET ORAL DAILY
Qty: 180 TABLET | Refills: 1 | Status: SHIPPED | OUTPATIENT
Start: 2025-05-26

## 2025-06-04 DIAGNOSIS — K21.9 GASTROESOPHAGEAL REFLUX DISEASE, UNSPECIFIED WHETHER ESOPHAGITIS PRESENT: ICD-10-CM

## 2025-06-04 RX ORDER — OMEPRAZOLE 40 MG/1
40 CAPSULE, DELAYED RELEASE ORAL DAILY
Qty: 90 CAPSULE | Refills: 1 | Status: SHIPPED | OUTPATIENT
Start: 2025-06-04

## 2025-06-04 RX ORDER — METOPROLOL SUCCINATE 25 MG/1
25 TABLET, EXTENDED RELEASE ORAL DAILY
Qty: 90 TABLET | Refills: 1 | Status: SHIPPED | OUTPATIENT
Start: 2025-06-04

## 2025-06-11 RX ORDER — LEVOTHYROXINE SODIUM 50 UG/1
50 TABLET ORAL DAILY
Qty: 90 TABLET | Refills: 1 | Status: SHIPPED | OUTPATIENT
Start: 2025-06-11

## 2025-06-16 ENCOUNTER — TELEPHONE (OUTPATIENT)
Dept: CARDIOLOGY | Facility: CLINIC | Age: 80
End: 2025-06-16
Payer: MEDICARE

## 2025-06-16 NOTE — TELEPHONE ENCOUNTER
Should patient continue half tablet of eliquis twice daily or whole tablet twice daily?    Please advise.

## 2025-06-16 NOTE — TELEPHONE ENCOUNTER
"    Caller: Helen Rousseau \"Val\"    Relationship to patient: Self    Best call back number: [phone]     Patient is needing: PT IS TAKING HALF AN ELIQUIS, BUT HER WATCH STATES SHE'S IN AFIB. PT DECLINED SYMPTOMS OTHER THAN A SLIGHT BIT OF HEAVINESS IN HER CHEST, BUT SHE STATED SHE DOES NOT FEEL BAD. PLS CALL WITH WHAT'S FURTHER RECOMMENDED.           "

## 2025-06-16 NOTE — TELEPHONE ENCOUNTER
Spoke to patient, states that she took an EKG on watch and said she was in Afib. Patients hr is 80-94. Patient states that per lov on 04-30-25 Dr. Andrews told patient to take half an eliquis twice daily.    Please advise.    Office Visit with Brennan Andrews MD (04/30/2025)

## 2025-06-23 ENCOUNTER — TELEPHONE (OUTPATIENT)
Dept: FAMILY MEDICINE CLINIC | Facility: CLINIC | Age: 80
End: 2025-06-23

## 2025-06-23 NOTE — TELEPHONE ENCOUNTER
"  Caller: Helen Rousseau \"Val\"    Relationship: Self    Best call back number:     Helen Rousseau \"Val\" (Self) [phone] (Mobile)       What was the call regarding: PATIENT WOULD LIKE TO KNOW IF DR ALCARAZ WOULD TAKE HER AS A NEW PATIENT     HER  AND DAUGHTER SEE HIM AND SHE WANTS TO BE HIS PATIENT ALSO       Is it okay if the provider responds through MyChart:   CALL  "

## 2025-06-27 ENCOUNTER — OFFICE VISIT (OUTPATIENT)
Dept: FAMILY MEDICINE CLINIC | Facility: CLINIC | Age: 80
End: 2025-06-27
Payer: MEDICARE

## 2025-06-27 ENCOUNTER — LAB (OUTPATIENT)
Dept: FAMILY MEDICINE CLINIC | Facility: CLINIC | Age: 80
End: 2025-06-27
Payer: MEDICARE

## 2025-06-27 VITALS
OXYGEN SATURATION: 98 % | SYSTOLIC BLOOD PRESSURE: 124 MMHG | BODY MASS INDEX: 20.56 KG/M2 | RESPIRATION RATE: 20 BRPM | WEIGHT: 131 LBS | DIASTOLIC BLOOD PRESSURE: 80 MMHG | HEIGHT: 67 IN | HEART RATE: 86 BPM

## 2025-06-27 DIAGNOSIS — E06.3 HYPOTHYROIDISM DUE TO HASHIMOTO THYROIDITIS: Primary | ICD-10-CM

## 2025-06-27 DIAGNOSIS — K21.9 GASTROESOPHAGEAL REFLUX DISEASE, UNSPECIFIED WHETHER ESOPHAGITIS PRESENT: ICD-10-CM

## 2025-06-27 DIAGNOSIS — R30.0 DYSURIA: ICD-10-CM

## 2025-06-27 DIAGNOSIS — M25.511 ACUTE PAIN OF RIGHT SHOULDER: ICD-10-CM

## 2025-06-27 DIAGNOSIS — I48.91 ATRIAL FIBRILLATION, UNSPECIFIED TYPE: ICD-10-CM

## 2025-06-27 DIAGNOSIS — M25.611 DECREASED RIGHT SHOULDER RANGE OF MOTION: ICD-10-CM

## 2025-06-27 PROCEDURE — 87086 URINE CULTURE/COLONY COUNT: CPT | Performed by: STUDENT IN AN ORGANIZED HEALTH CARE EDUCATION/TRAINING PROGRAM

## 2025-06-27 NOTE — PROGRESS NOTES
"Chief Complaint  Chief Complaint   Patient presents with    Establish Care    Pain     Shoulder x 3 weeks       Subjective        Helen Rousseau is a 80 y.o. female who presents to [facility].    History of Present Illness  Here to establish care with me.   We reviewed medical history and current medications.    Hypothyroidism on levothyroxine 50 mcg, liothyronine 10 mcg daily.  GERD on omeprazole 40 mg daily.  A fib on metoprolol succinate 25 mg daily, eliquis 5 mg bid.  Sees Dr. Andrews.    Has acute concern of R shoulder pain x 2-3 wks.  This past week she could not hardly move it.  She has been using heating pad.    No recent trauma.  Remote injury of \"pulling something\" in R shoulder 12 yrs ago while exercising.    She is R hand dominant.      Has hx of a lot of UTI's.   Uses d mannose, estradiol vaginal cream, probiotic.  Most recently treated for UTI earlier this month.      Objective   /80 (BP Location: Left arm)   Pulse 86   Resp 20   Ht 170.2 cm (67.01\")   Wt 59.4 kg (131 lb)   SpO2 98%   BMI 20.51 kg/m²     Estimated body mass index is 20.51 kg/m² as calculated from the following:    Height as of this encounter: 170.2 cm (67.01\").    Weight as of this encounter: 59.4 kg (131 lb).     Physical Exam   GEN: In no acute distress, non toxic appearing  HEENT: Pupils equal and reactive to light, sclera clear. Mucous membranes moist.   CV: Regular rate and rhythm, no murmurs  RESP: Lungs clear to auscultation anteriorly and posteriorly in all lung fields bilaterally.  MSK: Decreased ROM R shoulder.  150 degrees anterior shoulder flexion.  90 degrees lateral abduction.  All special tests of shoulder are mildly positive.    NEURO: AAO to person, place, and time. CN 2-12 intact grossly.   PSYCH: Affect normal, insight fair     PHQ-2 Depression Screening  Little interest or pleasure in doing things? Not at all   Feeling down, depressed, or hopeless? Not at all   PHQ-2 " Total Score 0      Result Review :              Assessment and Plan     Diagnoses and all orders for this visit:    1. Hypothyroidism due to Hashimoto thyroiditis (Primary)  Continue levothyroxine 50 mcg daily, liothyronine 10 mcg daily.  Check thyroid labs yearly, due in December.     2. Atrial fibrillation, unspecified type  Continue eliquis 5 mg bid, metoprolol succinate 25 mg daily.  Keep f/u with Dr. Andrews.      3. Gastroesophageal reflux disease, unspecified whether esophagitis present  Continue omeprazole 40 mg daily.    4. Acute pain of right shoulder  5. Decreased right shoulder range of motion  Suspect rotator cuff injury vs adhesive capsulitis.  Refer to PT.  If no improvement after 6 wks obtain MRI.    -     Ambulatory Referral to Physical Therapy for Evaluation & Treatment    6. Dysuria  Check urine culture to ensure infection has cleared.  Treat pending results.    -     Urine Culture - Urine, Urine, Clean Catch       Follow Up     Return in about 6 months (around 12/27/2025) for Medicare Wellness.

## 2025-06-29 LAB — BACTERIA SPEC AEROBE CULT: NO GROWTH

## 2025-08-25 ENCOUNTER — OFFICE VISIT (OUTPATIENT)
Dept: FAMILY MEDICINE CLINIC | Facility: CLINIC | Age: 80
End: 2025-08-25
Payer: MEDICARE

## 2025-08-25 ENCOUNTER — LAB (OUTPATIENT)
Dept: FAMILY MEDICINE CLINIC | Facility: CLINIC | Age: 80
End: 2025-08-25
Payer: MEDICARE

## 2025-08-25 VITALS
OXYGEN SATURATION: 99 % | HEART RATE: 66 BPM | RESPIRATION RATE: 18 BRPM | SYSTOLIC BLOOD PRESSURE: 142 MMHG | WEIGHT: 132.6 LBS | HEIGHT: 67 IN | BODY MASS INDEX: 20.81 KG/M2 | DIASTOLIC BLOOD PRESSURE: 73 MMHG

## 2025-08-25 DIAGNOSIS — R53.83 OTHER FATIGUE: Primary | ICD-10-CM

## 2025-08-25 DIAGNOSIS — E06.3 HYPOTHYROIDISM DUE TO HASHIMOTO THYROIDITIS: ICD-10-CM

## 2025-08-25 DIAGNOSIS — G47.00 INSOMNIA, UNSPECIFIED TYPE: ICD-10-CM

## 2025-08-25 LAB
ALBUMIN SERPL-MCNC: 4.1 G/DL (ref 3.5–5.2)
ALBUMIN/GLOB SERPL: 1.6 G/DL
ALP SERPL-CCNC: 58 U/L (ref 39–117)
ALT SERPL W P-5'-P-CCNC: 13 U/L (ref 1–33)
ANION GAP SERPL CALCULATED.3IONS-SCNC: 6 MMOL/L (ref 5–15)
AST SERPL-CCNC: 20 U/L (ref 1–32)
BASOPHILS # BLD AUTO: 0.04 10*3/MM3 (ref 0–0.2)
BASOPHILS NFR BLD AUTO: 1 % (ref 0–1.5)
BILIRUB SERPL-MCNC: 0.3 MG/DL (ref 0–1.2)
BUN SERPL-MCNC: 13 MG/DL (ref 8–23)
BUN/CREAT SERPL: 17.6 (ref 7–25)
CALCIUM SPEC-SCNC: 10.4 MG/DL (ref 8.6–10.5)
CHLORIDE SERPL-SCNC: 105 MMOL/L (ref 98–107)
CO2 SERPL-SCNC: 27 MMOL/L (ref 22–29)
CREAT SERPL-MCNC: 0.74 MG/DL (ref 0.57–1)
DEPRECATED RDW RBC AUTO: 41.3 FL (ref 37–54)
EGFRCR SERPLBLD CKD-EPI 2021: 81.4 ML/MIN/1.73
EOSINOPHIL # BLD AUTO: 0.07 10*3/MM3 (ref 0–0.4)
EOSINOPHIL NFR BLD AUTO: 1.7 % (ref 0.3–6.2)
ERYTHROCYTE [DISTWIDTH] IN BLOOD BY AUTOMATED COUNT: 12.3 % (ref 12.3–15.4)
GLOBULIN UR ELPH-MCNC: 2.6 GM/DL
GLUCOSE SERPL-MCNC: 94 MG/DL (ref 65–99)
HCT VFR BLD AUTO: 37.5 % (ref 34–46.6)
HGB BLD-MCNC: 12.4 G/DL (ref 12–15.9)
IMM GRANULOCYTES # BLD AUTO: 0.01 10*3/MM3 (ref 0–0.05)
IMM GRANULOCYTES NFR BLD AUTO: 0.2 % (ref 0–0.5)
LYMPHOCYTES # BLD AUTO: 1.04 10*3/MM3 (ref 0.7–3.1)
LYMPHOCYTES NFR BLD AUTO: 24.8 % (ref 19.6–45.3)
MCH RBC QN AUTO: 30.4 PG (ref 26.6–33)
MCHC RBC AUTO-ENTMCNC: 33.1 G/DL (ref 31.5–35.7)
MCV RBC AUTO: 91.9 FL (ref 79–97)
MONOCYTES # BLD AUTO: 0.36 10*3/MM3 (ref 0.1–0.9)
MONOCYTES NFR BLD AUTO: 8.6 % (ref 5–12)
NEUTROPHILS NFR BLD AUTO: 2.67 10*3/MM3 (ref 1.7–7)
NEUTROPHILS NFR BLD AUTO: 63.7 % (ref 42.7–76)
NRBC BLD AUTO-RTO: 0 /100 WBC (ref 0–0.2)
PLATELET # BLD AUTO: 268 10*3/MM3 (ref 140–450)
PMV BLD AUTO: 10.6 FL (ref 6–12)
POTASSIUM SERPL-SCNC: 4.3 MMOL/L (ref 3.5–5.2)
PROT SERPL-MCNC: 6.7 G/DL (ref 6–8.5)
RBC # BLD AUTO: 4.08 10*6/MM3 (ref 3.77–5.28)
SODIUM SERPL-SCNC: 138 MMOL/L (ref 136–145)
T3FREE SERPL-MCNC: 3.7 PG/ML (ref 2–4.4)
T4 FREE SERPL-MCNC: 0.95 NG/DL (ref 0.92–1.68)
TSH SERPL DL<=0.05 MIU/L-ACNC: 2.72 UIU/ML (ref 0.27–4.2)
VIT B12 BLD-MCNC: 509 PG/ML (ref 211–946)
WBC NRBC COR # BLD AUTO: 4.19 10*3/MM3 (ref 3.4–10.8)

## 2025-08-25 PROCEDURE — 99214 OFFICE O/P EST MOD 30 MIN: CPT | Performed by: STUDENT IN AN ORGANIZED HEALTH CARE EDUCATION/TRAINING PROGRAM

## 2025-08-25 PROCEDURE — 85025 COMPLETE CBC W/AUTO DIFF WBC: CPT | Performed by: STUDENT IN AN ORGANIZED HEALTH CARE EDUCATION/TRAINING PROGRAM

## 2025-08-25 PROCEDURE — 1126F AMNT PAIN NOTED NONE PRSNT: CPT | Performed by: STUDENT IN AN ORGANIZED HEALTH CARE EDUCATION/TRAINING PROGRAM

## 2025-08-25 PROCEDURE — 80053 COMPREHEN METABOLIC PANEL: CPT | Performed by: STUDENT IN AN ORGANIZED HEALTH CARE EDUCATION/TRAINING PROGRAM

## 2025-08-25 PROCEDURE — 82607 VITAMIN B-12: CPT | Performed by: STUDENT IN AN ORGANIZED HEALTH CARE EDUCATION/TRAINING PROGRAM

## 2025-08-25 PROCEDURE — 84439 ASSAY OF FREE THYROXINE: CPT | Performed by: STUDENT IN AN ORGANIZED HEALTH CARE EDUCATION/TRAINING PROGRAM

## 2025-08-25 PROCEDURE — 84481 FREE ASSAY (FT-3): CPT | Performed by: STUDENT IN AN ORGANIZED HEALTH CARE EDUCATION/TRAINING PROGRAM

## 2025-08-25 PROCEDURE — 36415 COLL VENOUS BLD VENIPUNCTURE: CPT | Performed by: STUDENT IN AN ORGANIZED HEALTH CARE EDUCATION/TRAINING PROGRAM

## 2025-08-25 PROCEDURE — 84443 ASSAY THYROID STIM HORMONE: CPT | Performed by: STUDENT IN AN ORGANIZED HEALTH CARE EDUCATION/TRAINING PROGRAM

## 2025-08-25 PROCEDURE — G2211 COMPLEX E/M VISIT ADD ON: HCPCS | Performed by: STUDENT IN AN ORGANIZED HEALTH CARE EDUCATION/TRAINING PROGRAM
